# Patient Record
Sex: MALE | Race: WHITE | Employment: OTHER | URBAN - NONMETROPOLITAN AREA
[De-identification: names, ages, dates, MRNs, and addresses within clinical notes are randomized per-mention and may not be internally consistent; named-entity substitution may affect disease eponyms.]

---

## 2017-05-05 ENCOUNTER — HOSPITAL ENCOUNTER (OUTPATIENT)
Dept: CT IMAGING | Age: 62
Discharge: HOME | End: 2017-05-05
Payer: COMMERCIAL

## 2017-05-05 DIAGNOSIS — R10.9 ABDOMINAL PAIN, UNSPECIFIED SITE: ICD-10-CM

## 2017-05-05 LAB
GFR NON-AFRICAN AMERICAN: >60
PERFORMED ON: NORMAL
POC CREATININE: 0.8 MG/DL (ref 0.3–1.3)
POC SAMPLE TYPE: NORMAL

## 2017-05-05 PROCEDURE — 6360000004 HC RX CONTRAST MEDICATION: Performed by: NURSE PRACTITIONER

## 2017-05-05 PROCEDURE — 82565 ASSAY OF CREATININE: CPT

## 2017-05-05 PROCEDURE — 74177 CT ABD & PELVIS W/CONTRAST: CPT

## 2017-05-05 RX ADMIN — IOVERSOL 75 ML: 741 INJECTION INTRA-ARTERIAL; INTRAVENOUS at 11:47

## 2017-08-15 ENCOUNTER — TELEPHONE (OUTPATIENT)
Dept: INTERNAL MEDICINE | Age: 62
End: 2017-08-15

## 2017-08-15 DIAGNOSIS — E78.5 HYPERLIPIDEMIA, UNSPECIFIED HYPERLIPIDEMIA TYPE: ICD-10-CM

## 2017-08-15 DIAGNOSIS — I10 ESSENTIAL HYPERTENSION: ICD-10-CM

## 2017-08-15 DIAGNOSIS — Z00.00 EXAMINATION, MEDICAL, GENERAL: ICD-10-CM

## 2017-08-15 DIAGNOSIS — Z12.5 SPECIAL SCREENING, PROSTATE CANCER: Primary | ICD-10-CM

## 2017-08-24 RX ORDER — ATENOLOL 25 MG/1
TABLET ORAL
Qty: 90 TABLET | Refills: 3 | Status: SHIPPED | OUTPATIENT
Start: 2017-08-24 | End: 2017-11-21

## 2017-09-20 RX ORDER — LOSARTAN POTASSIUM 25 MG/1
TABLET ORAL
Qty: 90 TABLET | Refills: 3 | Status: SHIPPED | OUTPATIENT
Start: 2017-09-20 | End: 2018-08-13 | Stop reason: SDUPTHER

## 2017-11-21 ENCOUNTER — TELEPHONE (OUTPATIENT)
Dept: INTERNAL MEDICINE | Age: 62
End: 2017-11-21

## 2017-11-21 DIAGNOSIS — I10 ESSENTIAL HYPERTENSION: ICD-10-CM

## 2017-11-21 RX ORDER — NEBIVOLOL 5 MG/1
5 TABLET ORAL DAILY
Qty: 30 TABLET | Refills: 11 | Status: SHIPPED | OUTPATIENT
Start: 2017-11-21 | End: 2018-08-13 | Stop reason: CLARIF

## 2017-11-21 NOTE — TELEPHONE ENCOUNTER
He needs Atenolol changed to something else, but not Toprol (has reaction).   [facility]

## 2017-11-30 DIAGNOSIS — R97.20 ELEVATED PSA: Primary | ICD-10-CM

## 2017-11-30 LAB — PSA SERPL-MCNC: 1.24 NG/ML (ref 0–4)

## 2017-12-06 ENCOUNTER — OFFICE VISIT (OUTPATIENT)
Dept: UROLOGY | Facility: CLINIC | Age: 62
End: 2017-12-06

## 2017-12-06 VITALS — HEIGHT: 69 IN | TEMPERATURE: 97.1 F | WEIGHT: 231 LBS | BODY MASS INDEX: 34.21 KG/M2

## 2017-12-06 DIAGNOSIS — N40.1 BENIGN PROSTATIC HYPERPLASIA WITH LOWER URINARY TRACT SYMPTOMS, SYMPTOM DETAILS UNSPECIFIED: ICD-10-CM

## 2017-12-06 DIAGNOSIS — R97.20 ELEVATED PSA: Primary | ICD-10-CM

## 2017-12-06 LAB
BILIRUB BLD-MCNC: NEGATIVE MG/DL
CLARITY, POC: CLEAR
COLOR UR: YELLOW
GLUCOSE UR STRIP-MCNC: NEGATIVE MG/DL
KETONES UR QL: NEGATIVE
LEUKOCYTE EST, POC: NEGATIVE
NITRITE UR-MCNC: NEGATIVE MG/ML
PH UR: 6.5 [PH] (ref 5–8)
PROT UR STRIP-MCNC: NEGATIVE MG/DL
RBC # UR STRIP: NEGATIVE /UL
SP GR UR: 1.01 (ref 1–1.03)
UROBILINOGEN UR QL: NORMAL

## 2017-12-06 PROCEDURE — 81003 URINALYSIS AUTO W/O SCOPE: CPT | Performed by: UROLOGY

## 2017-12-06 PROCEDURE — 99213 OFFICE O/P EST LOW 20 MIN: CPT | Performed by: UROLOGY

## 2017-12-06 NOTE — PROGRESS NOTES
Subjective    Mr. Thompson is 62 y.o. male    CHIEF COMPLAINT: BPH    Patient comes in today for follow-up of BPH he is on max meds of both Proscar and Flomax he does feel maybe his urgency and frequency is a little bit worse from then previously as a way score today is 15 he is not had any urinary tract infections no gross hematuria again some frequency and urgency no other symptoms at this time.    He also has a previous history of an elevated PSA with negative biopsy on his been normal now for quite some time in most recently remains normal at 1.2      History of Present Illness      The following portions of the patient's history were reviewed and updated as appropriate: allergies, current medications, past family history, past medical history, past social history, past surgical history and problem list.    Review of Systems   Constitutional: Negative.  Negative for chills and fever.   Gastrointestinal: Negative for abdominal distention, abdominal pain, anal bleeding, blood in stool and nausea.   Genitourinary: Negative for difficulty urinating, dysuria, flank pain, frequency, hematuria and urgency.   Psychiatric/Behavioral: Negative.  Negative for agitation and confusion.         Current Outpatient Prescriptions:   •  aspirin 81 MG EC tablet, Take 81 mg by mouth Daily., Disp: , Rfl:   •  atenolol (TENORMIN) 25 MG tablet, Take 25 mg by mouth Daily., Disp: , Rfl:   •  coenzyme Q10 100 MG capsule, Take 100 mg by mouth Daily., Disp: , Rfl:   •  finasteride (PROSCAR) 5 MG tablet, Take 1 tablet by mouth Daily., Disp: 90 tablet, Rfl: 5  •  Krill Oil 1000 MG capsule, Take 1 capsule by mouth Daily., Disp: , Rfl:   •  losartan (COZAAR) 25 MG tablet, Take 25 mg by mouth Daily., Disp: , Rfl:   •  Multiple Vitamins-Minerals (MULTIVITAMIN ADULT PO), Take 1 tablet by mouth Daily., Disp: , Rfl:   •  propranolol (INDERAL) 20 MG tablet, Take 20 mg by mouth Daily., Disp: , Rfl:   •  simvastatin (ZOCOR) 40 MG tablet, Take 40 mg by  "mouth Every Night., Disp: , Rfl:   •  tamsulosin (FLOMAX) 0.4 MG capsule 24 hr capsule, Take 1 capsule by mouth 2 (Two) Times a Day., Disp: 180 capsule, Rfl: 3    Past Medical History:   Diagnosis Date   • Hyperlipidemia    • Hypertension        Past Surgical History:   Procedure Laterality Date   • APPENDECTOMY     • CORONARY ARTERY BYPASS GRAFT         Social History     Social History   • Marital status: Unknown     Spouse name: N/A   • Number of children: N/A   • Years of education: N/A     Social History Main Topics   • Smoking status: Never Smoker   • Smokeless tobacco: Never Used   • Alcohol use Yes   • Drug use: None   • Sexual activity: Not Asked     Other Topics Concern   • None     Social History Narrative       Family History   Problem Relation Age of Onset   • Prostate cancer Father    • No Known Problems Mother        Objective    Temp 97.1 °F (36.2 °C)  Ht 175.3 cm (69\")  Wt 105 kg (231 lb)  BMI 34.11 kg/m2    Physical Exam   Constitutional: He is oriented to person, place, and time. He appears well-developed and well-nourished.   Pulmonary/Chest: Effort normal.   Abdominal: Soft. He exhibits no distension and no mass. There is no tenderness. There is no rebound and no guarding. No hernia.   Overweight  Non tender   Genitourinary: Testes normal and penis normal. Rectal exam shows no mass, no tenderness and anal tone normal. Enlarged: for the age of the patient. Right testis shows no mass, no swelling and no tenderness. Left testis shows no mass, no swelling and no tenderness. Circumcised. No hypospadias. No discharge found.   Genitourinary Comments:  The urethral meatus normal in position without evidence of stricture. Epididymis without mass or tenderness. Vas Deferens is palpably normal.Anus and perineum without mass or tenderness. The prostate is approximately 30 ml. It is Symmetric, with a Soft consistency. There are no nodules present. . The seminal vesicles are Not palpable due to the size of " the prostate.     Neurological: He is alert and oriented to person, place, and time.   Vitals reviewed.        Orders Only on 11/30/2017   Component Date Value Ref Range Status   • PSA 11/30/2017 1.240  0.000 - 4.000 ng/mL Final       Results for orders placed or performed in visit on 12/06/17   POC Urinalysis Dipstick, Automated   Result Value Ref Range    Color Yellow Yellow, Straw, Dark Yellow, [place]    Clarity, UA Clear Clear    Glucose, UA Negative Negative, 1000 mg/dL (3+) mg/dL    Bilirubin Negative Negative    Ketones, UA Negative Negative    Specific Gravity  1.015 1.005 - 1.030    Blood, UA Negative Negative    pH, Urine 6.5 5.0 - 8.0    Protein, POC Negative Negative mg/dL    Urobilinogen, UA Normal Normal    Leukocytes Negative Negative    Nitrite, UA Negative Negative       Assessment and Plan    Diagnoses and all orders for this visit:    Elevated PSA  -     POC Urinalysis Dipstick, Automated  -     PSA; Future    Benign prostatic hyperplasia with lower urinary tract symptoms, symptom details unspecified    Plan--we will continue to refill the Flomax for BPH I did discuss operative management with him including G Aniyah procedure TURP I think at this point he wants to continue the medications however if his symptoms worsen he like to do something he will let me know    Most recent PSAs have been normal so we will continue to follow that yearly

## 2017-12-11 ENCOUNTER — RESULTS ENCOUNTER (OUTPATIENT)
Dept: UROLOGY | Facility: CLINIC | Age: 62
End: 2017-12-11

## 2017-12-11 DIAGNOSIS — R97.20 ELEVATED PSA: ICD-10-CM

## 2017-12-20 ENCOUNTER — TELEPHONE (OUTPATIENT)
Dept: INTERNAL MEDICINE | Age: 62
End: 2017-12-20

## 2018-01-31 DIAGNOSIS — N13.8 BENIGN PROSTATIC HYPERPLASIA WITH URINARY OBSTRUCTION: ICD-10-CM

## 2018-01-31 DIAGNOSIS — N40.1 BENIGN PROSTATIC HYPERPLASIA WITH URINARY OBSTRUCTION: ICD-10-CM

## 2018-01-31 RX ORDER — TAMSULOSIN HYDROCHLORIDE 0.4 MG/1
CAPSULE ORAL
Qty: 60 CAPSULE | Refills: 0 | Status: SHIPPED | OUTPATIENT
Start: 2018-01-31 | End: 2018-12-06 | Stop reason: SDUPTHER

## 2018-06-05 RX ORDER — FINASTERIDE 5 MG/1
TABLET, FILM COATED ORAL
Qty: 90 TABLET | Refills: 3 | Status: SHIPPED | OUTPATIENT
Start: 2018-06-05

## 2018-07-02 RX ORDER — TAMSULOSIN HYDROCHLORIDE 0.4 MG/1
CAPSULE ORAL
Qty: 90 CAPSULE | Refills: 0 | Status: SHIPPED | OUTPATIENT
Start: 2018-07-02 | End: 2018-09-28 | Stop reason: SDUPTHER

## 2018-07-31 RX ORDER — ATENOLOL 25 MG/1
TABLET ORAL
Qty: 90 TABLET | Refills: 3 | Status: SHIPPED | OUTPATIENT
Start: 2018-07-31 | End: 2018-08-13 | Stop reason: SDUPTHER

## 2018-08-06 DIAGNOSIS — Z00.00 EXAMINATION, MEDICAL, GENERAL: ICD-10-CM

## 2018-08-06 DIAGNOSIS — Z12.5 SPECIAL SCREENING, PROSTATE CANCER: ICD-10-CM

## 2018-08-06 DIAGNOSIS — E78.5 HYPERLIPIDEMIA, UNSPECIFIED HYPERLIPIDEMIA TYPE: ICD-10-CM

## 2018-08-06 DIAGNOSIS — I10 ESSENTIAL HYPERTENSION: ICD-10-CM

## 2018-08-06 LAB
ALBUMIN SERPL-MCNC: 4.3 G/DL (ref 3.5–5.2)
ALP BLD-CCNC: 47 U/L (ref 40–130)
ALT SERPL-CCNC: 24 U/L (ref 5–41)
ANION GAP SERPL CALCULATED.3IONS-SCNC: 15 MMOL/L (ref 7–19)
AST SERPL-CCNC: 19 U/L (ref 5–40)
BILIRUB SERPL-MCNC: 0.4 MG/DL (ref 0.2–1.2)
BUN BLDV-MCNC: 11 MG/DL (ref 8–23)
CALCIUM SERPL-MCNC: 9.5 MG/DL (ref 8.8–10.2)
CHLORIDE BLD-SCNC: 104 MMOL/L (ref 98–111)
CHOLESTEROL, TOTAL: 141 MG/DL (ref 160–199)
CO2: 24 MMOL/L (ref 22–29)
CREAT SERPL-MCNC: 1 MG/DL (ref 0.5–1.2)
GFR NON-AFRICAN AMERICAN: >60
GLUCOSE BLD-MCNC: 104 MG/DL (ref 74–109)
HBA1C MFR BLD: 5.7 % (ref 4–6)
HCT VFR BLD CALC: 43 % (ref 42–52)
HDLC SERPL-MCNC: 61 MG/DL (ref 55–121)
HEMOGLOBIN: 14.1 G/DL (ref 14–18)
LDL CHOLESTEROL CALCULATED: 55 MG/DL
MCH RBC QN AUTO: 29.5 PG (ref 27–31)
MCHC RBC AUTO-ENTMCNC: 32.8 G/DL (ref 33–37)
MCV RBC AUTO: 90 FL (ref 80–94)
PDW BLD-RTO: 13.2 % (ref 11.5–14.5)
PLATELET # BLD: 195 K/UL (ref 130–400)
PMV BLD AUTO: 11.2 FL (ref 9.4–12.4)
POTASSIUM SERPL-SCNC: 4.4 MMOL/L (ref 3.5–5)
PROSTATE SPECIFIC ANTIGEN: 0.95 NG/ML (ref 0–4)
RBC # BLD: 4.78 M/UL (ref 4.7–6.1)
SODIUM BLD-SCNC: 143 MMOL/L (ref 136–145)
TOTAL PROTEIN: 6.6 G/DL (ref 6.6–8.7)
TRIGL SERPL-MCNC: 123 MG/DL (ref 0–149)
TSH SERPL DL<=0.05 MIU/L-ACNC: 3.24 UIU/ML (ref 0.27–4.2)
WBC # BLD: 6.5 K/UL (ref 4.8–10.8)

## 2018-08-13 ENCOUNTER — OFFICE VISIT (OUTPATIENT)
Dept: INTERNAL MEDICINE | Age: 63
End: 2018-08-13
Payer: MEDICARE

## 2018-08-13 VITALS
OXYGEN SATURATION: 98 % | HEIGHT: 70 IN | SYSTOLIC BLOOD PRESSURE: 120 MMHG | DIASTOLIC BLOOD PRESSURE: 66 MMHG | BODY MASS INDEX: 32.35 KG/M2 | HEART RATE: 53 BPM | WEIGHT: 226 LBS

## 2018-08-13 DIAGNOSIS — Z00.00 ROUTINE GENERAL MEDICAL EXAMINATION AT A HEALTH CARE FACILITY: Primary | ICD-10-CM

## 2018-08-13 DIAGNOSIS — Z23 NEED FOR PROPHYLACTIC VACCINATION AND INOCULATION AGAINST VARICELLA: ICD-10-CM

## 2018-08-13 DIAGNOSIS — Z12.5 PROSTATE CANCER SCREENING: ICD-10-CM

## 2018-08-13 DIAGNOSIS — I25.10 CORONARY ARTERY DISEASE INVOLVING NATIVE CORONARY ARTERY OF NATIVE HEART WITHOUT ANGINA PECTORIS: ICD-10-CM

## 2018-08-13 DIAGNOSIS — I10 ESSENTIAL HYPERTENSION: ICD-10-CM

## 2018-08-13 DIAGNOSIS — E78.00 PURE HYPERCHOLESTEROLEMIA: ICD-10-CM

## 2018-08-13 PROBLEM — N40.1 BENIGN PROSTATIC HYPERPLASIA WITH LOWER URINARY TRACT SYMPTOMS: Status: ACTIVE | Noted: 2017-12-06

## 2018-08-13 PROCEDURE — G0402 INITIAL PREVENTIVE EXAM: HCPCS | Performed by: INTERNAL MEDICINE

## 2018-08-13 PROCEDURE — G8598 ASA/ANTIPLAT THER USED: HCPCS | Performed by: INTERNAL MEDICINE

## 2018-08-13 RX ORDER — ASPIRIN 81 MG/1
81 TABLET ORAL DAILY
COMMUNITY
End: 2022-04-17 | Stop reason: ALTCHOICE

## 2018-08-13 RX ORDER — OMEPRAZOLE 20 MG/1
20 CAPSULE, DELAYED RELEASE ORAL DAILY
COMMUNITY

## 2018-08-13 RX ORDER — UBIDECARENONE 100 MG
100 CAPSULE ORAL DAILY
COMMUNITY
End: 2022-04-17 | Stop reason: ALTCHOICE

## 2018-08-13 RX ORDER — ATENOLOL 25 MG/1
TABLET ORAL
Qty: 90 TABLET | Refills: 3 | Status: SHIPPED | OUTPATIENT
Start: 2018-08-13 | End: 2019-08-14 | Stop reason: SDUPTHER

## 2018-08-13 RX ORDER — BIOTIN 5 MG
1 TABLET ORAL DAILY
COMMUNITY

## 2018-08-13 RX ORDER — SIMVASTATIN 40 MG
TABLET ORAL
Qty: 90 TABLET | Refills: 3 | Status: SHIPPED | OUTPATIENT
Start: 2018-08-13 | End: 2018-10-05 | Stop reason: SDUPTHER

## 2018-08-13 RX ORDER — LOSARTAN POTASSIUM 25 MG/1
TABLET ORAL
Qty: 90 TABLET | Refills: 3 | Status: SHIPPED | OUTPATIENT
Start: 2018-08-13 | End: 2019-08-14 | Stop reason: SDUPTHER

## 2018-08-13 ASSESSMENT — PATIENT HEALTH QUESTIONNAIRE - PHQ9
SUM OF ALL RESPONSES TO PHQ QUESTIONS 1-9: 0
SUM OF ALL RESPONSES TO PHQ QUESTIONS 1-9: 0

## 2018-08-13 NOTE — PROGRESS NOTES
Chief Complaint   Patient presents with    Medicare AWV    Hypertension     Heart rate was 53 today    Hyperlipidemia    Benign Prostatic Hypertrophy       HPI:Patient is here today for welcome to medicare- Medicare annual wellness visit and follow-up coronary artery disease hypertension hyperlipidemia and GERD. He shouldn't feels well he has occasional arthralgias but no chest pressure chest pain no dyspnea no syncope or near syncope or abdominal pain generally doing well blood pressure good when he checks it. No new complaints he denies syncope near syncope or lightheadedness or heart rates below 50. Past Medical History:   Diagnosis Date    Coronary artery disease     Essential hypertension     GERD (gastroesophageal reflux disease)     Hyperlipidemia        Past Surgical History:   Procedure Laterality Date    APPENDECTOMY      CORONARY ARTERY BYPASS GRAFT      PROSTATE SURGERY         Family History   Problem Relation Age of Onset    Prostate Cancer Father     Coronary Art Dis Other     High Cholesterol Other        Social History     Social History    Marital status:      Spouse name: N/A    Number of children: N/A    Years of education: N/A     Occupational History    Not on file. Social History Main Topics    Smoking status: Never Smoker    Smokeless tobacco: Never Used    Alcohol use Not on file    Drug use: Unknown    Sexual activity: Not on file     Other Topics Concern    Not on file     Social History Narrative    No narrative on file       Allergies   Allergen Reactions    Ramipril      Lips swell, hives       Current Outpatient Prescriptions   Medication Sig Dispense Refill    omeprazole (PRILOSEC) 20 MG delayed release capsule Take 20 mg by mouth daily      atenolol (TENORMIN) 25 MG tablet TAKE 1 TABLET BY MOUTH ONCE DAILY 90 tablet 3    losartan (COZAAR) 25 MG tablet TAKE (1) TABLET BY MOUTH ONCE DAILY AS DIRECTED.  90 tablet 3    simvastatin (ZOCOR) 40 MG  A1C test (Diabetic or Prediabetic)  08/06/2019    Potassium monitoring  08/06/2019    Creatinine monitoring  08/06/2019    Colon cancer screen colonoscopy  09/11/2022    Lipid screen  08/06/2023    DTaP/Tdap/Td vaccine (2 - Td) 04/11/2026     Recommendations for Preventive Services Due: see orders and patient instructions/AVS.  . Recommended screening schedule for the next 5-10 years is provided to the patient in written form: see Patient Instructions/AVS.  Obesity Counseling: Assessed behavioral health risks and factors affecting choice of behavior. Suggested weight control approaches, including dietary changes behavioral modification and follow up plan. Provided educational and support documentation. Time spent (minutes): work on weight loss with dietary changes suggested the Mediterranean diet.

## 2018-08-13 NOTE — PATIENT INSTRUCTIONS
you keep track of what you eat and what you do. Follow-up care is a key part of your treatment and safety. Be sure to make and go to all appointments, and call your doctor if you are having problems. It's also a good idea to know your test results and keep a list of the medicines you take. Where can you learn more? Go to [email or web address] and sign in to your [facility] account. Enter A121 in the [employer] box to learn more about \"Learning About Low-Carbohydrate Diets for Weight Loss. \"     If you do not have an account, please click on the \"Sign Up Now\" link. Current as of: May 12, 2017  Content Version: 11.7  © 1186-8983 [employer]. Care instructions adapted under license by [facility] ([facility]). If you have questions about a medical condition or this instruction, always ask your healthcare professional. [place] [address] any warranty or liability for your use of this information. Personalized Preventive Plan for [place]  - 8/13/2018  Medicare offers a range of preventive health benefits. Some of the tests and screenings are paid in full while other may be subject to a deductible, co-insurance, and/or copay. Some of these benefits include a comprehensive review of your medical history including lifestyle, illnesses that may run in your family, and various assessments and screenings as appropriate. After reviewing your medical record and screening and assessments performed today your provider may have ordered immunizations, labs, imaging, and/or referrals for you. A list of these orders (if applicable) as well as your Preventive Care list are included within your After Visit Summary for your review. Other Preventive Recommendations:    · A preventive eye exam performed by an eye specialist is recommended every 1-2 years to screen for glaucoma; cataracts, macular degeneration, and other eye disorders.   · A preventive dental visit is

## 2018-08-19 ASSESSMENT — ENCOUNTER SYMPTOMS
EYE REDNESS: 0
ABDOMINAL PAIN: 0
COUGH: 0
EYE DISCHARGE: 0
SINUS PRESSURE: 0
ABDOMINAL DISTENTION: 0
BACK PAIN: 0
SHORTNESS OF BREATH: 0

## 2018-09-24 ENCOUNTER — OFFICE VISIT (OUTPATIENT)
Dept: CARDIOLOGY | Facility: CLINIC | Age: 63
End: 2018-09-24

## 2018-09-24 VITALS
BODY MASS INDEX: 33.92 KG/M2 | HEART RATE: 61 BPM | HEIGHT: 69 IN | WEIGHT: 229 LBS | DIASTOLIC BLOOD PRESSURE: 70 MMHG | OXYGEN SATURATION: 99 % | SYSTOLIC BLOOD PRESSURE: 112 MMHG

## 2018-09-24 DIAGNOSIS — I10 ESSENTIAL HYPERTENSION: ICD-10-CM

## 2018-09-24 DIAGNOSIS — E78.2 MIXED HYPERLIPIDEMIA: ICD-10-CM

## 2018-09-24 DIAGNOSIS — I25.810 CORONARY ARTERY DISEASE INVOLVING CORONARY BYPASS GRAFT OF NATIVE HEART WITHOUT ANGINA PECTORIS: Primary | ICD-10-CM

## 2018-09-24 PROCEDURE — 99203 OFFICE O/P NEW LOW 30 MIN: CPT | Performed by: INTERNAL MEDICINE

## 2018-09-24 PROCEDURE — 93000 ELECTROCARDIOGRAM COMPLETE: CPT | Performed by: INTERNAL MEDICINE

## 2018-09-24 RX ORDER — CHLORAL HYDRATE 500 MG
1 CAPSULE ORAL DAILY
COMMUNITY
End: 2020-09-17

## 2018-09-24 RX ORDER — OMEPRAZOLE 20 MG/1
20 CAPSULE, DELAYED RELEASE ORAL DAILY
Status: ON HOLD | COMMUNITY
End: 2022-12-01 | Stop reason: SDUPTHER

## 2018-09-24 NOTE — PROGRESS NOTES
Referring Provider: Randi Parks MD    Reason for Consultation: re-establish care    Chief complaint:   Chief Complaint   Patient presents with   • New/Old patient     not referred.  last ov 2/27/15.  pt states no problems just felt like he needed to reestablish.   • Coronary Artery Disease     has been doing well.  he has been seeing his PCP yearly and they found his HR was lower.   • low heart rate     has been running in the 50's.         Subjective .     History of present illness:  Pawel Thompson is a 63 y.o. yo male with history of CAD, s/p CABG in 2003 who presents today for routine follow up.  Chief Complaint   Patient presents with   • New/Old patient     not referred.  last ov 2/27/15.  pt states no problems just felt like he needed to reestablish.   • Coronary Artery Disease     has been doing well.  he has been seeing his PCP yearly and they found his HR was lower.   • low heart rate     has been running in the 50's.     .    History  Past Medical History:   Diagnosis Date   • Coronary artery disease    • Hyperlipidemia    • Hypertension    ,   Past Surgical History:   Procedure Laterality Date   • APPENDECTOMY     • CORONARY ARTERY BYPASS GRAFT     ,   Family History   Problem Relation Age of Onset   • Prostate cancer Father    • Heart disease Father    • Heart disease Mother    • Stroke Mother    • Heart attack Brother    • Heart disease Brother    • No Known Problems Maternal Grandmother    • No Known Problems Maternal Grandfather    • Heart disease Paternal Grandmother    • No Known Problems Paternal Grandfather    • No Known Problems Brother    ,   Social History   Substance Use Topics   • Smoking status: Never Smoker   • Smokeless tobacco: Never Used   • Alcohol use 0.6 oz/week     1 Cans of beer per week      Comment: occasionally   ,     Medications  Current Outpatient Prescriptions   Medication Sig Dispense Refill   • aspirin 81 MG EC tablet Take 81 mg by mouth Daily.     • atenolol (TENORMIN)  "25 MG tablet Take 25 mg by mouth Daily.     • coenzyme Q10 100 MG capsule Take 100 mg by mouth Daily.     • finasteride (PROSCAR) 5 MG tablet Take 1 tablet by mouth Daily. 90 tablet 5   • losartan (COZAAR) 25 MG tablet Take 25 mg by mouth Daily.     • Omega-3 1000 MG capsule Take 1 capsule by mouth Daily.     • omeprazole (priLOSEC) 20 MG capsule Take 20 mg by mouth Daily.     • simvastatin (ZOCOR) 40 MG tablet Take 40 mg by mouth Every Night.     • tamsulosin (FLOMAX) 0.4 MG capsule 24 hr capsule TAKE (1) CAPSULE BY MOUTH TWICE DAILY. 60 capsule 0   • Krill Oil 1000 MG capsule Take 1 capsule by mouth Daily.     • Multiple Vitamins-Minerals (MULTIVITAMIN ADULT PO) Take 1 tablet by mouth Daily.     • propranolol (INDERAL) 20 MG tablet Take 20 mg by mouth Daily.       No current facility-administered medications for this visit.         Allergies:  Patient has no known allergies.    Review of Systems  Review of Systems   HENT: Negative for nosebleeds.    Cardiovascular: Negative for chest pain, claudication, dyspnea on exertion, irregular heartbeat, leg swelling, near-syncope, orthopnea, palpitations, paroxysmal nocturnal dyspnea and syncope.   Respiratory: Negative for cough, hemoptysis and shortness of breath.    Gastrointestinal: Negative for dysphagia, hematemesis and melena.   Genitourinary: Negative for hematuria.   All other systems reviewed and are negative.      Objective     Physical Exam:  /70 (BP Location: Left arm, Patient Position: Sitting, Cuff Size: Adult)   Pulse 61   Ht 175.3 cm (69\")   Wt 104 kg (229 lb)   SpO2 99%   BMI 33.82 kg/m²   Physical Exam   Constitutional: He is oriented to person, place, and time. He appears well-nourished. No distress.   HENT:   Head: Normocephalic.   Eyes: No scleral icterus.   Neck: Normal range of motion. Neck supple.   Cardiovascular: Normal rate, regular rhythm and normal heart sounds.  Exam reveals no gallop and no friction rub.    No murmur " heard.  Pulmonary/Chest: Effort normal and breath sounds normal. No respiratory distress. He has no wheezes. He has no rales.   Abdominal: Soft. Bowel sounds are normal. He exhibits no distension. There is no tenderness.   Musculoskeletal: He exhibits no edema.   Neurological: He is alert and oriented to person, place, and time.   Skin: Skin is warm and dry. He is not diaphoretic. No erythema.   Psychiatric: He has a normal mood and affect. His behavior is normal.       Results Review:   I reviewed the patient's new clinical results.    ECG 12 Lead  Date/Time: 9/24/2018 10:25 AM  Performed by: GEORGE CLIFFORD  Authorized by: GEORGE CLIFFORD   Comparison: not compared with previous ECG   Previous ECG: no previous ECG available  Rhythm: sinus rhythm  Rate: normal  Conduction: conduction normal  ST Segments: ST segments normal  T Waves: T waves normal  QRS axis: normal  Clinical impression: non-specific ECG  Comments: Poor R wave progression            Office Visit on 12/06/2017   Component Date Value Ref Range Status   • Color 12/06/2017 Yellow  Yellow, Straw, Dark Yellow, [place] Final   • Clarity, UA 12/06/2017 Clear  Clear Final   • Glucose, UA 12/06/2017 Negative  Negative, 1000 mg/dL (3+) mg/dL Final   • Bilirubin 12/06/2017 Negative  Negative Final   • Ketones, UA 12/06/2017 Negative  Negative Final   • Specific Gravity  12/06/2017 1.015  1.005 - 1.030 Final   • Blood, UA 12/06/2017 Negative  Negative Final   • pH, Urine 12/06/2017 6.5  5.0 - 8.0 Final   • Protein, POC 12/06/2017 Negative  Negative mg/dL Final   • Urobilinogen, UA 12/06/2017 Normal  Normal Final   • Leukocytes 12/06/2017 Negative  Negative Final   • Nitrite, UA 12/06/2017 Negative  Negative Final       Assessment/Plan   Pawel was seen today for new/old patient, coronary artery disease and low heart rate.    Diagnoses and all orders for this visit:    Coronary artery disease involving coronary bypass graft of native heart without angina pectoris, The  patient denies chest pain, shortness of breath, dyspnea on exertion, orthopnea, PND, edema. There is no evidence of ongoing ischemia    Mixed hyperlipidemia, Patient's statin therapy is followed by PCP.    Essential hypertension, good control    Other orders  -     ECG 12 Lead        Patient's Body mass index is 33.82 kg/m². BMI is above normal parameters. Recommendations include: exercise counseling.

## 2018-09-28 RX ORDER — TAMSULOSIN HYDROCHLORIDE 0.4 MG/1
CAPSULE ORAL
Qty: 90 CAPSULE | Refills: 3 | Status: SHIPPED | OUTPATIENT
Start: 2018-09-28

## 2018-10-05 DIAGNOSIS — E78.00 PURE HYPERCHOLESTEROLEMIA: ICD-10-CM

## 2018-10-05 RX ORDER — SIMVASTATIN 40 MG
TABLET ORAL
Qty: 90 TABLET | Refills: 0 | Status: SHIPPED | OUTPATIENT
Start: 2018-10-05 | End: 2019-08-11 | Stop reason: SDUPTHER

## 2018-10-10 ENCOUNTER — HOSPITAL ENCOUNTER (OUTPATIENT)
Dept: CARDIOLOGY | Facility: HOSPITAL | Age: 63
Discharge: HOME | End: 2018-10-10
Attending: INTERNAL MEDICINE | Admitting: INTERNAL MEDICINE

## 2018-10-10 DIAGNOSIS — I25.810 CORONARY ARTERY DISEASE INVOLVING CORONARY BYPASS GRAFT OF NATIVE HEART WITHOUT ANGINA PECTORIS: ICD-10-CM

## 2018-10-10 LAB
BH CV STRESS BP STAGE 1: NORMAL
BH CV STRESS BP STAGE 2: NORMAL
BH CV STRESS BP STAGE 3: NORMAL
BH CV STRESS DURATION MIN STAGE 1: 3
BH CV STRESS DURATION MIN STAGE 2: 3
BH CV STRESS DURATION MIN STAGE 3: 1
BH CV STRESS DURATION SEC STAGE 1: 0
BH CV STRESS DURATION SEC STAGE 2: 0
BH CV STRESS DURATION SEC STAGE 3: 46
BH CV STRESS GRADE STAGE 1: 10
BH CV STRESS GRADE STAGE 2: 12
BH CV STRESS GRADE STAGE 3: 14
BH CV STRESS HR STAGE 1: 88
BH CV STRESS HR STAGE 2: 111
BH CV STRESS HR STAGE 3: 129
BH CV STRESS METS STAGE 1: 5
BH CV STRESS METS STAGE 2: 7.5
BH CV STRESS METS STAGE 3: 10
BH CV STRESS PROTOCOL 1: NORMAL
BH CV STRESS RECOVERY BP: NORMAL MMHG
BH CV STRESS RECOVERY HR: 68 BPM
BH CV STRESS SPEED STAGE 1: 1.7
BH CV STRESS SPEED STAGE 2: 2.5
BH CV STRESS SPEED STAGE 3: 3.4
BH CV STRESS STAGE 1: 1
BH CV STRESS STAGE 2: 2
BH CV STRESS STAGE 3: 3
MAXIMAL PREDICTED HEART RATE: 155 BPM
PERCENT MAX PREDICTED HR: 83.23 %
STRESS BASELINE BP: NORMAL MMHG
STRESS BASELINE HR: 55 BPM
STRESS PERCENT HR: 98 %
STRESS POST ESTIMATED WORKLOAD: 10 METS
STRESS POST EXERCISE DUR MIN: 7 MIN
STRESS POST EXERCISE DUR SEC: 46 SEC
STRESS POST PEAK BP: NORMAL MMHG
STRESS POST PEAK HR: 129 BPM
STRESS TARGET HR: 132 BPM

## 2018-10-10 PROCEDURE — 93352 ADMIN ECG CONTRAST AGENT: CPT | Performed by: INTERNAL MEDICINE

## 2018-10-10 PROCEDURE — 93017 CV STRESS TEST TRACING ONLY: CPT

## 2018-10-10 PROCEDURE — 93350 STRESS TTE ONLY: CPT | Performed by: INTERNAL MEDICINE

## 2018-10-10 PROCEDURE — 25010000002 PERFLUTREN 6.52 MG/ML SUSPENSION: Performed by: INTERNAL MEDICINE

## 2018-10-10 PROCEDURE — 93018 CV STRESS TEST I&R ONLY: CPT | Performed by: INTERNAL MEDICINE

## 2018-10-10 PROCEDURE — 93350 STRESS TTE ONLY: CPT

## 2018-10-10 RX ADMIN — PERFLUTREN 8.48 MG: 6.52 INJECTION, SUSPENSION INTRAVENOUS at 10:44

## 2018-11-29 LAB — PSA SERPL-MCNC: 0.93 NG/ML (ref 0–4)

## 2018-12-06 ENCOUNTER — OFFICE VISIT (OUTPATIENT)
Dept: UROLOGY | Facility: CLINIC | Age: 63
End: 2018-12-06

## 2018-12-06 VITALS — BODY MASS INDEX: 33.92 KG/M2 | TEMPERATURE: 97.1 F | WEIGHT: 229 LBS | HEIGHT: 69 IN

## 2018-12-06 DIAGNOSIS — N40.1 BENIGN PROSTATIC HYPERPLASIA WITH LOWER URINARY TRACT SYMPTOMS, SYMPTOM DETAILS UNSPECIFIED: ICD-10-CM

## 2018-12-06 DIAGNOSIS — N13.8 BENIGN PROSTATIC HYPERPLASIA WITH URINARY OBSTRUCTION: Primary | ICD-10-CM

## 2018-12-06 DIAGNOSIS — N40.1 BENIGN PROSTATIC HYPERPLASIA WITH URINARY OBSTRUCTION: ICD-10-CM

## 2018-12-06 DIAGNOSIS — R97.20 ELEVATED PSA: ICD-10-CM

## 2018-12-06 DIAGNOSIS — N40.1 BENIGN PROSTATIC HYPERPLASIA WITH URINARY OBSTRUCTION: Primary | ICD-10-CM

## 2018-12-06 DIAGNOSIS — N13.8 BENIGN PROSTATIC HYPERPLASIA WITH URINARY OBSTRUCTION: ICD-10-CM

## 2018-12-06 LAB
BILIRUB BLD-MCNC: NEGATIVE MG/DL
CLARITY, POC: CLEAR
COLOR UR: YELLOW
GLUCOSE UR STRIP-MCNC: NEGATIVE MG/DL
KETONES UR QL: NEGATIVE
LEUKOCYTE EST, POC: NEGATIVE
NITRITE UR-MCNC: NEGATIVE MG/ML
PH UR: 6 [PH] (ref 5–8)
PROT UR STRIP-MCNC: NEGATIVE MG/DL
RBC # UR STRIP: NEGATIVE /UL
SP GR UR: 1.02 (ref 1–1.03)
UROBILINOGEN UR QL: NORMAL

## 2018-12-06 PROCEDURE — 81001 URINALYSIS AUTO W/SCOPE: CPT | Performed by: UROLOGY

## 2018-12-06 PROCEDURE — 99213 OFFICE O/P EST LOW 20 MIN: CPT | Performed by: UROLOGY

## 2018-12-06 RX ORDER — TAMSULOSIN HYDROCHLORIDE 0.4 MG/1
1 CAPSULE ORAL DAILY
Qty: 90 CAPSULE | Refills: 5 | Status: SHIPPED | OUTPATIENT
Start: 2018-12-06 | End: 2019-12-26

## 2018-12-06 RX ORDER — FINASTERIDE 5 MG/1
5 TABLET, FILM COATED ORAL DAILY
Qty: 90 TABLET | Refills: 5 | Status: SHIPPED | OUTPATIENT
Start: 2018-12-06 | End: 2020-03-04

## 2018-12-06 NOTE — PATIENT INSTRUCTIONS

## 2018-12-06 NOTE — PROGRESS NOTES
Subjective    Mr. Thompson is 63 y.o. male    CHIEF COMPLAINT: BPH    Patient came back today for follow-up of BPH clinically he is doing well his 8 AUA score today is 14 he does take Flomax on a daily basis and also finasteride on a daily basis he is not had any gross hematuria no urinary tract infections no worsening of his voiding symptoms since receiving no flank pain etc.    He also has a previous history of elevated PSA his PSA today is stable at 0.933      History of Present Illness      The following portions of the patient's history were reviewed and updated as appropriate: allergies, current medications, past family history, past medical history, past social history, past surgical history and problem list.    Review of Systems   Constitutional: Negative.    Gastrointestinal: Negative for abdominal distention, abdominal pain, blood in stool and nausea.   Genitourinary: Negative for difficulty urinating, dysuria, flank pain, frequency, hematuria and urgency.   Psychiatric/Behavioral: Negative.  Negative for agitation and confusion.         Current Outpatient Medications:   •  aspirin 81 MG EC tablet, Take 81 mg by mouth Daily., Disp: , Rfl:   •  atenolol (TENORMIN) 25 MG tablet, Take 25 mg by mouth Daily., Disp: , Rfl:   •  coenzyme Q10 100 MG capsule, Take 100 mg by mouth Daily., Disp: , Rfl:   •  finasteride (PROSCAR) 5 MG tablet, Take 1 tablet by mouth Daily., Disp: 90 tablet, Rfl: 5  •  Krill Oil 1000 MG capsule, Take 1 capsule by mouth Daily., Disp: , Rfl:   •  losartan (COZAAR) 25 MG tablet, Take 25 mg by mouth Daily., Disp: , Rfl:   •  Multiple Vitamins-Minerals (MULTIVITAMIN ADULT PO), Take 1 tablet by mouth Daily., Disp: , Rfl:   •  Omega-3 1000 MG capsule, Take 1 capsule by mouth Daily., Disp: , Rfl:   •  omeprazole (priLOSEC) 20 MG capsule, Take 20 mg by mouth Daily., Disp: , Rfl:   •  propranolol (INDERAL) 20 MG tablet, Take 20 mg by mouth Daily., Disp: , Rfl:   •  simvastatin (ZOCOR) 40 MG tablet,  "Take 40 mg by mouth Every Night., Disp: , Rfl:   •  tamsulosin (FLOMAX) 0.4 MG capsule 24 hr capsule, Take 1 capsule by mouth Daily., Disp: 90 capsule, Rfl: 5    Past Medical History:   Diagnosis Date   • Coronary artery disease    • Hyperlipidemia    • Hypertension        Past Surgical History:   Procedure Laterality Date   • APPENDECTOMY     • CORONARY ARTERY BYPASS GRAFT         Social History     Socioeconomic History   • Marital status:      Spouse name: Not on file   • Number of children: Not on file   • Years of education: Not on file   • Highest education level: Not on file   Tobacco Use   • Smoking status: Never Smoker   • Smokeless tobacco: Never Used   Substance and Sexual Activity   • Alcohol use: Yes     Alcohol/week: 0.6 oz     Types: 1 Cans of beer per week     Comment: occasionally   • Drug use: No   • Sexual activity: Defer       Family History   Problem Relation Age of Onset   • Prostate cancer Father    • Heart disease Father    • Heart disease Mother    • Stroke Mother    • Heart attack Brother    • Heart disease Brother    • No Known Problems Maternal Grandmother    • No Known Problems Maternal Grandfather    • Heart disease Paternal Grandmother    • No Known Problems Paternal Grandfather    • No Known Problems Brother        Objective    Temp 97.1 °F (36.2 °C)   Ht 175.3 cm (69\")   Wt 104 kg (229 lb)   BMI 33.82 kg/m²     Physical Exam      Hospital Outpatient Visit on 10/10/2018   Component Date Value Ref Range Status   • BH CV STRESS PROTOCOL 1 10/10/2018 Wilfredo   Final   • Stage 1 10/10/2018 1   Final   • HR Stage 1 10/10/2018 88   Final   • BP Stage 1 10/10/2018 162/55   Final   • Duration Min Stage 1 10/10/2018 3   Final   • Duration Sec Stage 1 10/10/2018 0   Final   • Grade Stage 1 10/10/2018 10   Final   • Speed Stage 1 10/10/2018 1.7   Final   • BH CV STRESS METS STAGE 1 10/10/2018 5   Final   • Stage 2 10/10/2018 2   Final   • HR Stage 2 10/10/2018 111   Final   • BP Stage 2 " 10/10/2018 178/59   Final   • Duration Min Stage 2 10/10/2018 3   Final   • Duration Sec Stage 2 10/10/2018 0   Final   • Grade Stage 2 10/10/2018 12   Final   • Speed Stage 2 10/10/2018 2.5   Final   • BH CV STRESS METS STAGE 2 10/10/2018 7.5   Final   • Stage 3 10/10/2018 3   Final   • HR Stage 3 10/10/2018 129   Final   • BP Stage 3 10/10/2018 194/51   Final   • Duration Min Stage 3 10/10/2018 1   Final   • Duration Sec Stage 3 10/10/2018 46   Final   • Grade Stage 3 10/10/2018 14   Final   • Speed Stage 3 10/10/2018 3.4   Final   • BH CV STRESS METS STAGE 3 10/10/2018 10.0   Final   • Baseline HR 10/10/2018 55  bpm Final   • Baseline BP 10/10/2018 140/80  mmHg Final   • Peak HR 10/10/2018 129  bpm Final   • Percent Max Pred HR 10/10/2018 83.23  % Final   • Percent Target HR 10/10/2018 98  % Final   • Peak BP 10/10/2018 194/51  mmHg Final   • Recovery HR 10/10/2018 68  bpm Final   • Recovery BP 10/10/2018 149/64  mmHg Final   • Target HR (85%) 10/10/2018 132  bpm Final   • Max. Pred. HR (100%) 10/10/2018 155  bpm Final   • Exercise duration (min) 10/10/2018 7  min Final   • Exercise duration (sec) 10/10/2018 46  sec Final   • Estimated workload 10/10/2018 10.0  METS Final       Results for orders placed or performed in visit on 12/06/18   POC Urinalysis Dipstick, Multipro   Result Value Ref Range    Color Yellow Yellow, Straw, Dark Yellow, [place]    Clarity, UA Clear Clear    Glucose, UA Negative Negative, 1000 mg/dL (3+) mg/dL    Bilirubin Negative Negative    Ketones, UA Negative Negative    Specific Gravity  1.020 1.005 - 1.030    Blood, UA Negative Negative    pH, Urine 6.0 5.0 - 8.0    Protein, POC Negative Negative mg/dL    Urobilinogen, UA Normal Normal    Nitrite, UA Negative Negative    Leukocytes Negative Negative     Patient's Body mass index is 33.82 kg/m². BMI is above normal parameters. Recommendations include: educational material.  Assessment and Plan    Diagnoses and all orders for this  visit:    BPH (benign prostatic hypertrophy) with urinary obstruction  -     POC Urinalysis Dipstick, Multipro  -     tamsulosin (FLOMAX) 0.4 MG capsule 24 hr capsule; Take 1 capsule by mouth Daily.  -     PSA DIAGNOSTIC; Future    Elevated PSA    Benign prostatic hyperplasia with lower urinary tract symptoms, symptom details unspecified  -     finasteride (PROSCAR) 5 MG tablet; Take 1 tablet by mouth Daily.    Benign prostatic hyperplasia with urinary obstruction  -     POC Urinalysis Dipstick, Multipro  -     tamsulosin (FLOMAX) 0.4 MG capsule 24 hr capsule; Take 1 capsule by mouth Daily.  -     PSA DIAGNOSTIC; Future    Plan--patient seems to doing well we will refill his finasteride and Proscar and Flomax for him I will seen back again in the office in 1 year.    We will recheck a PSA at that time if he should have any worsening of his symptoms he will let me know

## 2019-08-11 DIAGNOSIS — E78.00 PURE HYPERCHOLESTEROLEMIA: ICD-10-CM

## 2019-08-13 RX ORDER — SIMVASTATIN 40 MG
TABLET ORAL
Qty: 90 TABLET | Refills: 3 | Status: SHIPPED | OUTPATIENT
Start: 2019-08-13 | End: 2019-08-14 | Stop reason: SDUPTHER

## 2019-08-14 DIAGNOSIS — I10 ESSENTIAL HYPERTENSION: ICD-10-CM

## 2019-08-14 DIAGNOSIS — E78.00 PURE HYPERCHOLESTEROLEMIA: ICD-10-CM

## 2019-08-14 RX ORDER — ATENOLOL 25 MG/1
TABLET ORAL
Qty: 90 TABLET | Refills: 3 | Status: SHIPPED | OUTPATIENT
Start: 2019-08-14 | End: 2020-10-02

## 2019-08-14 RX ORDER — LOSARTAN POTASSIUM 25 MG/1
TABLET ORAL
Qty: 90 TABLET | Refills: 3 | Status: SHIPPED | OUTPATIENT
Start: 2019-08-14 | End: 2020-07-30

## 2019-08-14 RX ORDER — SIMVASTATIN 40 MG
TABLET ORAL
Qty: 90 TABLET | Refills: 3 | Status: SHIPPED | OUTPATIENT
Start: 2019-08-14 | End: 2020-10-14

## 2019-08-27 DIAGNOSIS — E78.00 PURE HYPERCHOLESTEROLEMIA: ICD-10-CM

## 2019-08-27 DIAGNOSIS — Z00.00 ROUTINE GENERAL MEDICAL EXAMINATION AT A HEALTH CARE FACILITY: ICD-10-CM

## 2019-08-27 DIAGNOSIS — Z12.5 PROSTATE CANCER SCREENING: ICD-10-CM

## 2019-08-27 LAB
ALBUMIN SERPL-MCNC: 4.6 G/DL (ref 3.5–5.2)
ALP BLD-CCNC: 46 U/L (ref 40–130)
ALT SERPL-CCNC: 28 U/L (ref 5–41)
ANION GAP SERPL CALCULATED.3IONS-SCNC: 12 MMOL/L (ref 7–19)
AST SERPL-CCNC: 21 U/L (ref 5–40)
BILIRUB SERPL-MCNC: 0.4 MG/DL (ref 0.2–1.2)
BUN BLDV-MCNC: 13 MG/DL (ref 8–23)
CALCIUM SERPL-MCNC: 9.7 MG/DL (ref 8.8–10.2)
CHLORIDE BLD-SCNC: 103 MMOL/L (ref 98–111)
CHOLESTEROL, TOTAL: 154 MG/DL (ref 160–199)
CO2: 25 MMOL/L (ref 22–29)
CREAT SERPL-MCNC: 1 MG/DL (ref 0.5–1.2)
GFR NON-AFRICAN AMERICAN: >60
GLUCOSE BLD-MCNC: 106 MG/DL (ref 74–109)
HBA1C MFR BLD: 5.7 % (ref 4–6)
HCT VFR BLD CALC: 44.7 % (ref 42–52)
HDLC SERPL-MCNC: 65 MG/DL (ref 55–121)
HEMOGLOBIN: 14.8 G/DL (ref 14–18)
LDL CHOLESTEROL CALCULATED: 70 MG/DL
MCH RBC QN AUTO: 30.3 PG (ref 27–31)
MCHC RBC AUTO-ENTMCNC: 33.1 G/DL (ref 33–37)
MCV RBC AUTO: 91.6 FL (ref 80–94)
PDW BLD-RTO: 13.2 % (ref 11.5–14.5)
PLATELET # BLD: 225 K/UL (ref 130–400)
PMV BLD AUTO: 11.4 FL (ref 9.4–12.4)
POTASSIUM SERPL-SCNC: 4.7 MMOL/L (ref 3.5–5)
PROSTATE SPECIFIC ANTIGEN: 1.09 NG/ML (ref 0–4)
RBC # BLD: 4.88 M/UL (ref 4.7–6.1)
SODIUM BLD-SCNC: 140 MMOL/L (ref 136–145)
TOTAL PROTEIN: 7.4 G/DL (ref 6.6–8.7)
TRIGL SERPL-MCNC: 96 MG/DL (ref 0–149)
TSH SERPL DL<=0.05 MIU/L-ACNC: 3.52 UIU/ML (ref 0.27–4.2)
WBC # BLD: 7.6 K/UL (ref 4.8–10.8)

## 2019-08-31 ENCOUNTER — HOSPITAL ENCOUNTER (OUTPATIENT)
Facility: HOSPITAL | Age: 64
Setting detail: OBSERVATION
Discharge: HOME | End: 2019-09-01
Attending: FAMILY MEDICINE | Admitting: FAMILY MEDICINE

## 2019-08-31 ENCOUNTER — APPOINTMENT (OUTPATIENT)
Dept: CARDIOLOGY | Facility: HOSPITAL | Age: 64
End: 2019-08-31

## 2019-08-31 ENCOUNTER — APPOINTMENT (OUTPATIENT)
Dept: GENERAL RADIOLOGY | Facility: HOSPITAL | Age: 64
End: 2019-08-31

## 2019-08-31 DIAGNOSIS — R07.9 CHEST PAIN, UNSPECIFIED TYPE: ICD-10-CM

## 2019-08-31 DIAGNOSIS — I48.91 NEW ONSET ATRIAL FIBRILLATION (HCC): Primary | ICD-10-CM

## 2019-08-31 PROBLEM — I25.10 CORONARY ARTERY DISEASE: Status: ACTIVE | Noted: 2019-08-31

## 2019-08-31 PROBLEM — E78.5 HYPERLIPIDEMIA: Status: ACTIVE | Noted: 2019-08-31

## 2019-08-31 PROBLEM — I10 HYPERTENSION: Status: ACTIVE | Noted: 2019-08-31

## 2019-08-31 LAB
ALBUMIN SERPL-MCNC: 4.3 G/DL (ref 3.5–5)
ALBUMIN/GLOB SERPL: 1.5 G/DL (ref 1.1–2.5)
ALP SERPL-CCNC: 42 U/L (ref 24–120)
ALT SERPL W P-5'-P-CCNC: 36 U/L (ref 0–54)
ANION GAP SERPL CALCULATED.3IONS-SCNC: 6 MMOL/L (ref 4–13)
AST SERPL-CCNC: 31 U/L (ref 7–45)
BASOPHILS # BLD AUTO: 0.03 10*3/MM3 (ref 0–0.2)
BASOPHILS NFR BLD AUTO: 0.3 % (ref 0–1.5)
BH CV STRESS BP STAGE 1: NORMAL
BH CV STRESS BP STAGE 2: NORMAL
BH CV STRESS BP STAGE 3: NORMAL
BH CV STRESS DOB - ATROPINE STAGE 3: 0.6
BH CV STRESS DOSE DOBUTAMINE STAGE 1: 10
BH CV STRESS DOSE DOBUTAMINE STAGE 2: 20
BH CV STRESS DOSE DOBUTAMINE STAGE 3: 30
BH CV STRESS DOSE DOBUTAMINE STAGE 4: 40
BH CV STRESS DURATION MIN STAGE 1: 3
BH CV STRESS DURATION MIN STAGE 2: 3
BH CV STRESS DURATION MIN STAGE 3: 3
BH CV STRESS DURATION MIN STAGE 4: 0
BH CV STRESS DURATION SEC STAGE 1: 0
BH CV STRESS DURATION SEC STAGE 2: 0
BH CV STRESS DURATION SEC STAGE 3: 0
BH CV STRESS DURATION SEC STAGE 4: 54
BH CV STRESS HR STAGE 1: 102
BH CV STRESS HR STAGE 2: 108
BH CV STRESS HR STAGE 3: 118
BH CV STRESS HR STAGE 4: 183
BH CV STRESS PROTOCOL 1: NORMAL
BH CV STRESS RECOVERY BP: NORMAL MMHG
BH CV STRESS RECOVERY HR: 96 BPM
BH CV STRESS STAGE 1: 1
BH CV STRESS STAGE 2: 2
BH CV STRESS STAGE 3: 3
BH CV STRESS STAGE 4: 4
BILIRUB SERPL-MCNC: 0.7 MG/DL (ref 0.1–1)
BUN BLD-MCNC: 12 MG/DL (ref 5–21)
BUN/CREAT SERPL: 14.1 (ref 7–25)
CALCIUM SPEC-SCNC: 9.5 MG/DL (ref 8.4–10.4)
CHLORIDE SERPL-SCNC: 105 MMOL/L (ref 98–110)
CO2 SERPL-SCNC: 27 MMOL/L (ref 24–31)
CREAT BLD-MCNC: 0.85 MG/DL (ref 0.5–1.4)
D DIMER PPP FEU-MCNC: 0.43 MG/L (FEU) (ref 0–0.5)
DEPRECATED RDW RBC AUTO: 43 FL (ref 37–54)
EOSINOPHIL # BLD AUTO: 0.13 10*3/MM3 (ref 0–0.4)
EOSINOPHIL NFR BLD AUTO: 1.2 % (ref 0.3–6.2)
ERYTHROCYTE [DISTWIDTH] IN BLOOD BY AUTOMATED COUNT: 13.3 % (ref 12.3–15.4)
GFR SERPL CREATININE-BSD FRML MDRD: 90 ML/MIN/1.73
GLOBULIN UR ELPH-MCNC: 2.9 GM/DL
GLUCOSE BLD-MCNC: 104 MG/DL (ref 70–100)
HCT VFR BLD AUTO: 42 % (ref 37.5–51)
HGB BLD-MCNC: 14.4 G/DL (ref 13–17.7)
IMM GRANULOCYTES # BLD AUTO: 0.03 10*3/MM3 (ref 0–0.05)
IMM GRANULOCYTES NFR BLD AUTO: 0.3 % (ref 0–0.5)
LYMPHOCYTES # BLD AUTO: 1.87 10*3/MM3 (ref 0.7–3.1)
LYMPHOCYTES NFR BLD AUTO: 17.7 % (ref 19.6–45.3)
MAXIMAL PREDICTED HEART RATE: 155 BPM
MCH RBC QN AUTO: 30.3 PG (ref 26.6–33)
MCHC RBC AUTO-ENTMCNC: 34.3 G/DL (ref 31.5–35.7)
MCV RBC AUTO: 88.4 FL (ref 79–97)
MONOCYTES # BLD AUTO: 1.18 10*3/MM3 (ref 0.1–0.9)
MONOCYTES NFR BLD AUTO: 11.2 % (ref 5–12)
NEUTROPHILS # BLD AUTO: 7.3 10*3/MM3 (ref 1.7–7)
NEUTROPHILS NFR BLD AUTO: 69.3 % (ref 42.7–76)
NRBC BLD AUTO-RTO: 0 /100 WBC (ref 0–0.2)
NT-PROBNP SERPL-MCNC: 869 PG/ML (ref 0–900)
PERCENT MAX PREDICTED HR: 118.06 %
PLATELET # BLD AUTO: 209 10*3/MM3 (ref 140–450)
PMV BLD AUTO: 11.1 FL (ref 6–12)
POTASSIUM BLD-SCNC: 4.3 MMOL/L (ref 3.5–5.3)
PROT SERPL-MCNC: 7.2 G/DL (ref 6.3–8.7)
RBC # BLD AUTO: 4.75 10*6/MM3 (ref 4.14–5.8)
SODIUM BLD-SCNC: 138 MMOL/L (ref 135–145)
STRESS BASELINE BP: NORMAL MMHG
STRESS BASELINE HR: 74 BPM
STRESS PERCENT HR: 139 %
STRESS POST EXERCISE DUR MIN: 9 MIN
STRESS POST EXERCISE DUR SEC: 54 SEC
STRESS POST PEAK BP: NORMAL MMHG
STRESS POST PEAK HR: 183 BPM
STRESS TARGET HR: 132 BPM
T4 FREE SERPL-MCNC: 0.95 NG/DL (ref 0.78–2.19)
TROPONIN I SERPL-MCNC: <0.012 NG/ML (ref 0–0.03)
TROPONIN I SERPL-MCNC: <0.012 NG/ML (ref 0–0.03)
TSH SERPL DL<=0.05 MIU/L-ACNC: 1.73 UIU/ML (ref 0.47–4.68)
WBC NRBC COR # BLD: 10.54 10*3/MM3 (ref 3.4–10.8)

## 2019-08-31 PROCEDURE — 94760 N-INVAS EAR/PLS OXIMETRY 1: CPT

## 2019-08-31 PROCEDURE — 99285 EMERGENCY DEPT VISIT HI MDM: CPT

## 2019-08-31 PROCEDURE — 25010000002 PERFLUTREN 6.52 MG/ML SUSPENSION: Performed by: INTERNAL MEDICINE

## 2019-08-31 PROCEDURE — 93350 STRESS TTE ONLY: CPT | Performed by: INTERNAL MEDICINE

## 2019-08-31 PROCEDURE — 93350 STRESS TTE ONLY: CPT

## 2019-08-31 PROCEDURE — 94799 UNLISTED PULMONARY SVC/PX: CPT

## 2019-08-31 PROCEDURE — 93010 ELECTROCARDIOGRAM REPORT: CPT | Performed by: INTERNAL MEDICINE

## 2019-08-31 PROCEDURE — 83880 ASSAY OF NATRIURETIC PEPTIDE: CPT | Performed by: PHYSICIAN ASSISTANT

## 2019-08-31 PROCEDURE — 93018 CV STRESS TEST I&R ONLY: CPT | Performed by: INTERNAL MEDICINE

## 2019-08-31 PROCEDURE — 84481 FREE ASSAY (FT-3): CPT | Performed by: PHYSICIAN ASSISTANT

## 2019-08-31 PROCEDURE — 85379 FIBRIN DEGRADATION QUANT: CPT | Performed by: PHYSICIAN ASSISTANT

## 2019-08-31 PROCEDURE — 84484 ASSAY OF TROPONIN QUANT: CPT | Performed by: PHYSICIAN ASSISTANT

## 2019-08-31 PROCEDURE — 25010000003 DOBUTAMINE PER 250 MG: Performed by: INTERNAL MEDICINE

## 2019-08-31 PROCEDURE — G0378 HOSPITAL OBSERVATION PER HR: HCPCS

## 2019-08-31 PROCEDURE — 84443 ASSAY THYROID STIM HORMONE: CPT | Performed by: PHYSICIAN ASSISTANT

## 2019-08-31 PROCEDURE — 93005 ELECTROCARDIOGRAM TRACING: CPT | Performed by: FAMILY MEDICINE

## 2019-08-31 PROCEDURE — 93017 CV STRESS TEST TRACING ONLY: CPT

## 2019-08-31 PROCEDURE — 85025 COMPLETE CBC W/AUTO DIFF WBC: CPT | Performed by: PHYSICIAN ASSISTANT

## 2019-08-31 PROCEDURE — 96365 THER/PROPH/DIAG IV INF INIT: CPT

## 2019-08-31 PROCEDURE — 93005 ELECTROCARDIOGRAM TRACING: CPT | Performed by: PHYSICIAN ASSISTANT

## 2019-08-31 PROCEDURE — 80053 COMPREHEN METABOLIC PANEL: CPT | Performed by: PHYSICIAN ASSISTANT

## 2019-08-31 PROCEDURE — 84439 ASSAY OF FREE THYROXINE: CPT | Performed by: PHYSICIAN ASSISTANT

## 2019-08-31 PROCEDURE — 71045 X-RAY EXAM CHEST 1 VIEW: CPT

## 2019-08-31 PROCEDURE — 93352 ADMIN ECG CONTRAST AGENT: CPT | Performed by: INTERNAL MEDICINE

## 2019-08-31 RX ORDER — DOBUTAMINE HYDROCHLORIDE 100 MG/100ML
10-50 INJECTION INTRAVENOUS CONTINUOUS
Status: DISCONTINUED | OUTPATIENT
Start: 2019-08-31 | End: 2019-09-01 | Stop reason: HOSPADM

## 2019-08-31 RX ORDER — FINASTERIDE 5 MG/1
5 TABLET, FILM COATED ORAL DAILY
Status: DISCONTINUED | OUTPATIENT
Start: 2019-08-31 | End: 2019-09-01 | Stop reason: HOSPADM

## 2019-08-31 RX ORDER — ONDANSETRON 2 MG/ML
4 INJECTION INTRAMUSCULAR; INTRAVENOUS EVERY 6 HOURS PRN
Status: DISCONTINUED | OUTPATIENT
Start: 2019-08-31 | End: 2019-09-01 | Stop reason: HOSPADM

## 2019-08-31 RX ORDER — ALUMINA, MAGNESIA, AND SIMETHICONE 2400; 2400; 240 MG/30ML; MG/30ML; MG/30ML
15 SUSPENSION ORAL EVERY 6 HOURS PRN
Status: DISCONTINUED | OUTPATIENT
Start: 2019-08-31 | End: 2019-09-01 | Stop reason: HOSPADM

## 2019-08-31 RX ORDER — LOSARTAN POTASSIUM 25 MG/1
25 TABLET ORAL DAILY
Status: DISCONTINUED | OUTPATIENT
Start: 2019-08-31 | End: 2019-09-01 | Stop reason: HOSPADM

## 2019-08-31 RX ORDER — ASPIRIN 81 MG/1
81 TABLET ORAL DAILY
Status: DISCONTINUED | OUTPATIENT
Start: 2019-09-01 | End: 2019-09-01 | Stop reason: HOSPADM

## 2019-08-31 RX ORDER — METOPROLOL TARTRATE 5 MG/5ML
5 INJECTION INTRAVENOUS ONCE
Status: COMPLETED | OUTPATIENT
Start: 2019-08-31 | End: 2019-08-31

## 2019-08-31 RX ORDER — ATORVASTATIN CALCIUM 10 MG/1
20 TABLET, FILM COATED ORAL NIGHTLY
Status: DISCONTINUED | OUTPATIENT
Start: 2019-08-31 | End: 2019-09-01 | Stop reason: HOSPADM

## 2019-08-31 RX ORDER — ATENOLOL 25 MG/1
25 TABLET ORAL DAILY
Status: DISCONTINUED | OUTPATIENT
Start: 2019-08-31 | End: 2019-09-01 | Stop reason: HOSPADM

## 2019-08-31 RX ORDER — BISACODYL 5 MG/1
5 TABLET, DELAYED RELEASE ORAL DAILY PRN
Status: DISCONTINUED | OUTPATIENT
Start: 2019-08-31 | End: 2019-09-01 | Stop reason: HOSPADM

## 2019-08-31 RX ORDER — LANOLIN ALCOHOL/MO/W.PET/CERES
3 CREAM (GRAM) TOPICAL NIGHTLY PRN
Status: DISCONTINUED | OUTPATIENT
Start: 2019-08-31 | End: 2019-09-01 | Stop reason: HOSPADM

## 2019-08-31 RX ORDER — FINASTERIDE 5 MG/1
5 TABLET, FILM COATED ORAL DAILY
Status: DISCONTINUED | OUTPATIENT
Start: 2019-08-31 | End: 2019-08-31

## 2019-08-31 RX ORDER — TAMSULOSIN HYDROCHLORIDE 0.4 MG/1
0.4 CAPSULE ORAL DAILY
Status: DISCONTINUED | OUTPATIENT
Start: 2019-08-31 | End: 2019-08-31

## 2019-08-31 RX ORDER — ASPIRIN 325 MG
325 TABLET ORAL ONCE
Status: COMPLETED | OUTPATIENT
Start: 2019-08-31 | End: 2019-08-31

## 2019-08-31 RX ORDER — SODIUM CHLORIDE 0.9 % (FLUSH) 0.9 %
10 SYRINGE (ML) INJECTION EVERY 12 HOURS SCHEDULED
Status: DISCONTINUED | OUTPATIENT
Start: 2019-08-31 | End: 2019-09-01 | Stop reason: HOSPADM

## 2019-08-31 RX ORDER — TAMSULOSIN HYDROCHLORIDE 0.4 MG/1
0.4 CAPSULE ORAL DAILY
Status: DISCONTINUED | OUTPATIENT
Start: 2019-08-31 | End: 2019-09-01 | Stop reason: HOSPADM

## 2019-08-31 RX ORDER — ATENOLOL 25 MG/1
25 TABLET ORAL DAILY
Status: DISCONTINUED | OUTPATIENT
Start: 2019-08-31 | End: 2019-08-31

## 2019-08-31 RX ORDER — ACETAMINOPHEN 325 MG/1
650 TABLET ORAL EVERY 4 HOURS PRN
Status: DISCONTINUED | OUTPATIENT
Start: 2019-08-31 | End: 2019-09-01 | Stop reason: HOSPADM

## 2019-08-31 RX ORDER — NITROGLYCERIN 0.4 MG/1
0.4 TABLET SUBLINGUAL
Status: DISCONTINUED | OUTPATIENT
Start: 2019-08-31 | End: 2019-09-01 | Stop reason: HOSPADM

## 2019-08-31 RX ORDER — ACETAMINOPHEN 650 MG/1
650 SUPPOSITORY RECTAL EVERY 4 HOURS PRN
Status: DISCONTINUED | OUTPATIENT
Start: 2019-08-31 | End: 2019-09-01 | Stop reason: HOSPADM

## 2019-08-31 RX ORDER — LOSARTAN POTASSIUM 25 MG/1
25 TABLET ORAL DAILY
Status: DISCONTINUED | OUTPATIENT
Start: 2019-08-31 | End: 2019-08-31

## 2019-08-31 RX ORDER — SODIUM CHLORIDE 0.9 % (FLUSH) 0.9 %
10 SYRINGE (ML) INJECTION AS NEEDED
Status: DISCONTINUED | OUTPATIENT
Start: 2019-08-31 | End: 2019-09-01 | Stop reason: HOSPADM

## 2019-08-31 RX ORDER — PANTOPRAZOLE SODIUM 40 MG/1
40 TABLET, DELAYED RELEASE ORAL
Status: DISCONTINUED | OUTPATIENT
Start: 2019-09-01 | End: 2019-09-01 | Stop reason: HOSPADM

## 2019-08-31 RX ORDER — ACETAMINOPHEN 160 MG/5ML
650 SOLUTION ORAL EVERY 4 HOURS PRN
Status: DISCONTINUED | OUTPATIENT
Start: 2019-08-31 | End: 2019-09-01 | Stop reason: HOSPADM

## 2019-08-31 RX ADMIN — LOSARTAN POTASSIUM 25 MG: 25 TABLET ORAL at 20:51

## 2019-08-31 RX ADMIN — NITROGLYCERIN 0.4 MG: 0.4 TABLET SUBLINGUAL at 12:00

## 2019-08-31 RX ADMIN — APIXABAN 5 MG: 5 TABLET, FILM COATED ORAL at 20:51

## 2019-08-31 RX ADMIN — FINASTERIDE 5 MG: 5 TABLET, FILM COATED ORAL at 20:51

## 2019-08-31 RX ADMIN — ATORVASTATIN CALCIUM 20 MG: 10 TABLET, FILM COATED ORAL at 20:51

## 2019-08-31 RX ADMIN — TAMSULOSIN HYDROCHLORIDE 0.4 MG: 0.4 CAPSULE ORAL at 20:51

## 2019-08-31 RX ADMIN — ASPIRIN 325 MG: 325 TABLET ORAL at 10:46

## 2019-08-31 RX ADMIN — DILTIAZEM HYDROCHLORIDE 5 MG/HR: 5 INJECTION INTRAVENOUS at 11:17

## 2019-08-31 RX ADMIN — SODIUM CHLORIDE, PRESERVATIVE FREE 10 ML: 5 INJECTION INTRAVENOUS at 20:51

## 2019-08-31 RX ADMIN — METOPROLOL TARTRATE 5 MG: 5 INJECTION, SOLUTION INTRAVENOUS at 15:28

## 2019-08-31 RX ADMIN — PERFLUTREN 8.48 MG: 6.52 INJECTION, SUSPENSION INTRAVENOUS at 14:53

## 2019-08-31 RX ADMIN — Medication 10 MCG/KG/MIN: at 14:53

## 2019-08-31 RX ADMIN — ATROPINE SULFATE 0.6 MG: 0.1 INJECTION PARENTERAL at 15:23

## 2019-08-31 RX ADMIN — ATENOLOL 25 MG: 25 TABLET ORAL at 20:53

## 2019-08-31 RX ADMIN — APIXABAN 5 MG: 5 TABLET, FILM COATED ORAL at 14:36

## 2019-09-01 VITALS
OXYGEN SATURATION: 95 % | BODY MASS INDEX: 33.63 KG/M2 | DIASTOLIC BLOOD PRESSURE: 57 MMHG | HEART RATE: 54 BPM | WEIGHT: 227.07 LBS | TEMPERATURE: 98.4 F | SYSTOLIC BLOOD PRESSURE: 110 MMHG | RESPIRATION RATE: 18 BRPM | HEIGHT: 69 IN

## 2019-09-01 LAB — T3FREE SERPL-MCNC: 2.9 PG/ML (ref 2–4.4)

## 2019-09-01 PROCEDURE — 94799 UNLISTED PULMONARY SVC/PX: CPT

## 2019-09-01 PROCEDURE — G0378 HOSPITAL OBSERVATION PER HR: HCPCS

## 2019-09-01 PROCEDURE — 94760 N-INVAS EAR/PLS OXIMETRY 1: CPT

## 2019-09-01 RX ADMIN — APIXABAN 5 MG: 5 TABLET, FILM COATED ORAL at 08:36

## 2019-09-01 RX ADMIN — PANTOPRAZOLE SODIUM 40 MG: 40 TABLET, DELAYED RELEASE ORAL at 05:50

## 2019-09-01 RX ADMIN — ASPIRIN 81 MG: 81 TABLET ORAL at 08:36

## 2019-09-01 RX ADMIN — SODIUM CHLORIDE, PRESERVATIVE FREE 10 ML: 5 INJECTION INTRAVENOUS at 08:36

## 2019-09-01 NOTE — DISCHARGE SUMMARY
[facility]  DISCHARGE SUMMARY       Date of Admission: 8/31/2019  Date of Discharge:  9/1/2019  Primary Care Physician: Randi Parks MD    Discharge Diagnoses:  Patient Active Problem List   Diagnosis   • New onset atrial fibrillation (CMS/HCC)   • Coronary artery disease   • Hypertension   • Hyperlipidemia         Presenting Problem/History of Present Illness:  New onset atrial fibrillation (CMS/HCC) [I48.91]     Chief Complaint on Day of Discharge:   No complaint    History of Present Illness on Day of Discharge:   The patient has had no further discomfort or no further complaints.  He has converted to normal sinus rhythm spontaneously.  Stress echo performed yesterday was low risk for ischemia.  The patient is stable for discharge on anticoagulant therapy.  He is to follow-up with his primary care physician next week and with Dr. Lorenzana in 3 weeks.    Hospital Course  This 63-year-old white male was admitted with a chief complaint of chest tightness.  The patient states that the symptoms began yesterday evening.  He also states that the chest tightness worsens with exertion.  This morning the patient began to have associated nausea and weakness and presented to the emergency department for further evaluation.  The patient has a history of coronary artery disease with a CABG in 2003 and because of his previous history he felt as though he should be further evaluated.     In the emergency department chest x-ray shows no acute cardiopulmonary disease.  2 troponins were negative.  D-dimer and proBNP was within normal limits.  CBC within normal limits and CMP within normal limits as well.  Telemetry monitoring and EKG in the emergency department revealed atrial fibrillation which is of new onset.    PLAN:   Admit to the medical surgical floor  Initiate Eliquis 5 mg p.o. twice daily  Stress echo  Serial troponins  Telemetry monitoring    The patient has had no  further discomfort or no further complaints.  He has converted to normal sinus rhythm spontaneously.  Stress echo performed yesterday was low risk for ischemia.  The patient is stable for discharge on anticoagulant therapy.  He is to follow-up with his primary care physician next week and with Dr. Lorenzana in 3 weeks.      Pertinent Test Results:   Lab Results (last 7 days)     Procedure Component Value Units Date/Time    T3, Free [[id number]] Collected:  08/31/19 1042    Specimen:  Blood Updated:  09/01/19 0808     T3, Free 2.9 pg/mL     Narrative:       Performed at:  [address] [place] [address]  [id number]  : Jc Ornelas PhD, Phone:  [phone]    Troponin [[id number]]  (Normal) Collected:  08/31/19 1321    Specimen:  Blood Updated:  08/31/19 1354     Troponin I <0.012 ng/mL     TSH [[id number]]  (Normal) Collected:  08/31/19 1045    Specimen:  Blood Updated:  08/31/19 1221     TSH 1.730 uIU/mL     T4, Free [[id number]]  (Normal) Collected:  08/31/19 1045    Specimen:  Blood Updated:  08/31/19 1207     Free T4 0.95 ng/dL     Troponin [[id number]]  (Normal) Collected:  08/31/19 1042    Specimen:  Blood Updated:  08/31/19 1119     Troponin I <0.012 ng/mL     BNP [[id number]]  (Normal) Collected:  08/31/19 1042    Specimen:  Blood Updated:  08/31/19 1119     proBNP 869.0 pg/mL     Comprehensive Metabolic Panel [[id number]]  (Abnormal) Collected:  08/31/19 1042    Specimen:  Blood Updated:  08/31/19 1114     Glucose 104 mg/dL      BUN 12 mg/dL      Creatinine 0.85 mg/dL      Sodium 138 mmol/L      Potassium 4.3 mmol/L      Comment: Specimen hemolyzed.  Results may be affected.        Chloride 105 mmol/L      CO2 27.0 mmol/L      Calcium 9.5 mg/dL      Total Protein 7.2 g/dL      Albumin 4.30 g/dL      ALT (SGPT) 36 U/L      Comment: Specimen hemolyzed.  Results may be affected.        AST (SGOT) 31 U/L      Comment: Specimen hemolyzed.  Results may be affected.        Alkaline Phosphatase  "42 U/L      Comment: Specimen hemolyzed. Results may be affected.        Total Bilirubin 0.7 mg/dL      eGFR Non African Amer 90 mL/min/1.73      Globulin 2.9 gm/dL      A/G Ratio 1.5 g/dL      BUN/Creatinine Ratio 14.1     Anion Gap 6.0 mmol/L     D-dimer, Quantitative [[id number]]  (Normal) Collected:  08/31/19 1042    Specimen:  Blood Updated:  08/31/19 1104     D-Dimer, Quantitative 0.43 mg/L (FEU)     CBC Auto Differential [[id number]]  (Abnormal) Collected:  08/31/19 1042    Specimen:  Blood Updated:  08/31/19 1054     WBC 10.54 10*3/mm3      RBC 4.75 10*6/mm3      Hemoglobin 14.4 g/dL      Hematocrit 42.0 %      MCV 88.4 fL      MCH 30.3 pg      MCHC 34.3 g/dL      RDW 13.3 %      RDW-SD 43.0 fl      MPV 11.1 fL      Platelets 209 10*3/mm3      Neutrophil % 69.3 %      Lymphocyte % 17.7 %      Monocyte % 11.2 %      Eosinophil % 1.2 %      Basophil % 0.3 %      Immature Grans % 0.3 %      Neutrophils, Absolute 7.30 10*3/mm3      Lymphocytes, Absolute 1.87 10*3/mm3      Monocytes, Absolute 1.18 10*3/mm3      Eosinophils, Absolute 0.13 10*3/mm3      Basophils, Absolute 0.03 10*3/mm3      Immature Grans, Absolute 0.03 10*3/mm3      nRBC 0.0 /100 WBC             Condition on Discharge:    Stable and in normal sinus rhythm    Physical Exam on Discharge:  /57 (BP Location: Right arm, Patient Position: Lying)   Pulse 54   Temp 98.4 °F (36.9 °C) (Oral)   Resp 18   Ht 175.3 cm (69.02\")   Wt 103 kg (227 lb 1.2 oz)   SpO2 95%   BMI 33.52 kg/m²   Physical Exam  Constitutional: He is oriented to person, place, and time. Vital signs are normal. He appears well-developed and well-nourished. He is cooperative.   HENT:   Head: Normocephalic and atraumatic.   Right Ear: External ear normal.   Left Ear: External ear normal.   Nose: Nose normal.   Mouth/Throat: Oropharynx is clear and moist.   Eyes: EOM are normal. Pupils are equal, round, and reactive to light. No scleral icterus.   Neck: Normal range of motion. Neck " supple. No JVD present.   Cardiovascular: Normal rate, normal heart sounds and intact distal pulses.   Normal sinus rhythm present.   No murmur heard.  Pulmonary/Chest: Effort normal and breath sounds normal. No respiratory distress.   Abdominal: Soft. Bowel sounds are normal. He exhibits no mass. There is no tenderness.   Musculoskeletal: Normal range of motion. He exhibits no edema or tenderness.   Neurological: He is alert and oriented to person, place, and time. No cranial nerve deficit. He exhibits normal muscle tone.   Skin: Skin is warm and dry. No rash noted.   Psychiatric: He has a normal mood and affect.    Discharge Disposition:  Home or Self Care    Discharge Medications:     Discharge Medications      New Medications      Instructions Start Date   apixaban 5 MG tablet tablet  Commonly known as:  ELIQUIS   5 mg, Oral, Every 12 Hours Scheduled         Continue These Medications      Instructions Start Date   aspirin 81 MG EC tablet   81 mg, Oral, Daily      atenolol 25 MG tablet  Commonly known as:  TENORMIN   25 mg, Oral, Daily      coenzyme Q10 100 MG capsule   100 mg, Oral, Daily      finasteride 5 MG tablet  Commonly known as:  PROSCAR   5 mg, Oral, Daily      Krill Oil 1000 MG capsule   1 capsule, Oral, Daily      losartan 25 MG tablet  Commonly known as:  COZAAR   25 mg, Oral, Daily      MULTIVITAMIN ADULT PO   1 tablet, Oral, Daily      Omega-3 1000 MG capsule   1 capsule, Oral, Daily      omeprazole 20 MG capsule  Commonly known as:  priLOSEC   20 mg, Oral, Daily      propranolol 20 MG tablet  Commonly known as:  INDERAL   20 mg, Oral, Daily      simvastatin 40 MG tablet  Commonly known as:  ZOCOR   40 mg, Oral, Nightly      tamsulosin 0.4 MG capsule 24 hr capsule  Commonly known as:  FLOMAX   1 capsule, Oral, Daily             Discharge Diet:   Diet Instructions     Diet: Regular; Thin      Discharge Diet:  Regular    Fluid Consistency:  Thin          Discharge Care Plan / Instructions:    Discharge home    Activity at Discharge:   Activity Instructions     Activity as Tolerated            Follow-up Appointments:  Follow-up with primary care physician, Dr. Parks next week  Follow-up with Dr. Lorenzana and 3 weeks       Dony Cardona DO  09/01/19  12:38 PM    Time: Discharge Less than 30 min    Please note that portions of this note may have been completed with a voice recognition program. Efforts were made to edit the dictations, but occasionally words are mistranscribed.

## 2019-09-01 NOTE — PLAN OF CARE
Problem: Arrhythmia/Dysrhythmia (Symptomatic) (Adult)  Goal: Signs and Symptoms of Listed Potential Problems Will be Absent, Minimized or Managed (Arrhythmia/Dysrhythmia)  Outcome: Ongoing (interventions implemented as appropriate)      Problem: Cardiac: ACS (Acute Coronary Syndrome) (Adult)  Goal: Signs and Symptoms of Listed Potential Problems Will be Absent, Minimized or Managed (Cardiac: ACS)  Outcome: Ongoing (interventions implemented as appropriate)      Problem: Patient Care Overview  Goal: Plan of Care Review  Outcome: Ongoing (interventions implemented as appropriate)   09/01/19 0312   Coping/Psychosocial   Plan of Care Reviewed With patient   Plan of Care Review   Progress improving   OTHER   Outcome Summary VSS, no c/o pain, converted to sinus at 17:51 has been SB-S 55-63 1 degree

## 2019-09-03 ENCOUNTER — TELEPHONE (OUTPATIENT)
Dept: INTERNAL MEDICINE | Age: 64
End: 2019-09-03

## 2019-09-03 DIAGNOSIS — G89.29 CHRONIC LOW BACK PAIN WITHOUT SCIATICA, UNSPECIFIED BACK PAIN LATERALITY: Primary | ICD-10-CM

## 2019-09-03 DIAGNOSIS — R30.0 DYSURIA: Primary | ICD-10-CM

## 2019-09-03 DIAGNOSIS — M54.50 CHRONIC LOW BACK PAIN WITHOUT SCIATICA, UNSPECIFIED BACK PAIN LATERALITY: Primary | ICD-10-CM

## 2019-09-03 LAB
BILIRUBIN URINE: NEGATIVE
BLOOD, URINE: NEGATIVE
CLARITY: CLEAR
COLOR: YELLOW
GLUCOSE URINE: NEGATIVE MG/DL
KETONES, URINE: NEGATIVE MG/DL
LEUKOCYTE ESTERASE, URINE: NEGATIVE
NITRITE, URINE: NEGATIVE
PH UA: 6.5 (ref 5–8)
PROTEIN UA: NEGATIVE MG/DL
SPECIFIC GRAVITY UA: 1.01 (ref 1–1.03)
URINE REFLEX TO CULTURE: NORMAL
UROBILINOGEN, URINE: 0.2 E.U./DL

## 2019-09-06 ENCOUNTER — OFFICE VISIT (OUTPATIENT)
Dept: INTERNAL MEDICINE | Age: 64
End: 2019-09-06
Payer: MEDICARE

## 2019-09-06 VITALS
DIASTOLIC BLOOD PRESSURE: 62 MMHG | SYSTOLIC BLOOD PRESSURE: 112 MMHG | WEIGHT: 225.8 LBS | HEART RATE: 63 BPM | OXYGEN SATURATION: 97 % | BODY MASS INDEX: 32.33 KG/M2 | HEIGHT: 70 IN

## 2019-09-06 DIAGNOSIS — E78.00 PURE HYPERCHOLESTEROLEMIA: ICD-10-CM

## 2019-09-06 DIAGNOSIS — R97.20 ELEVATED PSA: ICD-10-CM

## 2019-09-06 DIAGNOSIS — I48.91 NEW ONSET ATRIAL FIBRILLATION (HCC): Primary | ICD-10-CM

## 2019-09-06 DIAGNOSIS — R73.9 HYPERGLYCEMIA: ICD-10-CM

## 2019-09-06 DIAGNOSIS — G47.10 HYPERSOMNIA: ICD-10-CM

## 2019-09-06 DIAGNOSIS — Z12.5 SCREENING FOR PROSTATE CANCER: ICD-10-CM

## 2019-09-06 DIAGNOSIS — I48.0 PAROXYSMAL ATRIAL FIBRILLATION (HCC): ICD-10-CM

## 2019-09-06 DIAGNOSIS — I25.10 CORONARY ARTERY DISEASE INVOLVING NATIVE CORONARY ARTERY OF NATIVE HEART WITHOUT ANGINA PECTORIS: ICD-10-CM

## 2019-09-06 PROCEDURE — 1111F DSCHRG MED/CURRENT MED MERGE: CPT | Performed by: INTERNAL MEDICINE

## 2019-09-06 PROCEDURE — 99495 TRANSJ CARE MGMT MOD F2F 14D: CPT | Performed by: INTERNAL MEDICINE

## 2019-09-06 RX ORDER — ROSUVASTATIN CALCIUM 40 MG/1
40 TABLET, COATED ORAL DAILY
Qty: 90 TABLET | Refills: 3 | Status: SHIPPED | OUTPATIENT
Start: 2019-09-06 | End: 2019-09-06

## 2019-09-06 ASSESSMENT — PATIENT HEALTH QUESTIONNAIRE - PHQ9
1. LITTLE INTEREST OR PLEASURE IN DOING THINGS: 0
SUM OF ALL RESPONSES TO PHQ QUESTIONS 1-9: 0
SUM OF ALL RESPONSES TO PHQ QUESTIONS 1-9: 0
SUM OF ALL RESPONSES TO PHQ9 QUESTIONS 1 & 2: 0
2. FEELING DOWN, DEPRESSED OR HOPELESS: 0

## 2019-09-18 PROBLEM — I48.91 NEW ONSET ATRIAL FIBRILLATION (HCC): Status: ACTIVE | Noted: 2019-09-18

## 2019-09-18 PROBLEM — I48.0 PAROXYSMAL ATRIAL FIBRILLATION (HCC): Status: ACTIVE | Noted: 2019-09-18

## 2019-09-18 ASSESSMENT — ENCOUNTER SYMPTOMS
EYE DISCHARGE: 0
ABDOMINAL DISTENTION: 0
SINUS PRESSURE: 0
SHORTNESS OF BREATH: 0
ABDOMINAL PAIN: 0
COUGH: 0
BACK PAIN: 0
EYE REDNESS: 0

## 2019-09-24 ENCOUNTER — OFFICE VISIT (OUTPATIENT)
Dept: CARDIOLOGY | Facility: CLINIC | Age: 64
End: 2019-09-24

## 2019-09-24 VITALS
SYSTOLIC BLOOD PRESSURE: 118 MMHG | HEIGHT: 69 IN | BODY MASS INDEX: 33.47 KG/M2 | HEART RATE: 50 BPM | WEIGHT: 226 LBS | DIASTOLIC BLOOD PRESSURE: 68 MMHG

## 2019-09-24 DIAGNOSIS — E66.09 CLASS 1 OBESITY DUE TO EXCESS CALORIES WITH SERIOUS COMORBIDITY AND BODY MASS INDEX (BMI) OF 33.0 TO 33.9 IN ADULT: ICD-10-CM

## 2019-09-24 DIAGNOSIS — I25.10 CORONARY ARTERY DISEASE INVOLVING NATIVE CORONARY ARTERY OF NATIVE HEART WITHOUT ANGINA PECTORIS: Primary | ICD-10-CM

## 2019-09-24 DIAGNOSIS — Z95.1 S/P CABG X 3: ICD-10-CM

## 2019-09-24 DIAGNOSIS — Z79.01 CURRENT USE OF LONG TERM ANTICOAGULATION: ICD-10-CM

## 2019-09-24 DIAGNOSIS — E78.2 MIXED HYPERLIPIDEMIA: ICD-10-CM

## 2019-09-24 DIAGNOSIS — I10 ESSENTIAL HYPERTENSION: ICD-10-CM

## 2019-09-24 DIAGNOSIS — I48.0 PAF (PAROXYSMAL ATRIAL FIBRILLATION) (HCC): ICD-10-CM

## 2019-09-24 PROBLEM — E66.811 CLASS 1 OBESITY DUE TO EXCESS CALORIES WITH SERIOUS COMORBIDITY AND BODY MASS INDEX (BMI) OF 33.0 TO 33.9 IN ADULT: Status: ACTIVE | Noted: 2019-09-24

## 2019-09-24 PROCEDURE — 93000 ELECTROCARDIOGRAM COMPLETE: CPT | Performed by: NURSE PRACTITIONER

## 2019-09-24 PROCEDURE — 99214 OFFICE O/P EST MOD 30 MIN: CPT | Performed by: NURSE PRACTITIONER

## 2019-09-24 NOTE — PATIENT INSTRUCTIONS
Obesity, Adult  Obesity is the condition of having too much total body fat. Being overweight or obese means that your weight is greater than what is considered healthy for your body size. Obesity is determined by a measurement called BMI. BMI is an estimate of body fat and is calculated from height and weight. For adults, a BMI of 30 or higher is considered obese.  Obesity can eventually lead to other health concerns and major illnesses, including:  · Stroke.  · Coronary artery disease (CAD).  · Type 2 diabetes.  · Some types of cancer, including cancers of the colon, breast, uterus, and gallbladder.  · Osteoarthritis.  · High blood pressure (hypertension).  · High cholesterol.  · Sleep apnea.  · Gallbladder stones.  · Infertility problems.  What are the causes?  The main cause of obesity is taking in (consuming) more calories than your body uses for energy. Other factors that contribute to this condition may include:  · Being born with genes that make you more likely to become obese.  · Having a medical condition that causes obesity. These conditions include:  ? Hypothyroidism.  ? Polycystic ovarian syndrome (PCOS).  ? Binge-eating disorder.  ? Cushing syndrome.  · Taking certain medicines, such as steroids, antidepressants, and seizure medicines.  · Not being physically active (sedentary lifestyle).  · Living where there are limited places to exercise safely or buy healthy foods.  · Not getting enough sleep.  What increases the risk?  The following factors may increase your risk of this condition:  · Having a family history of obesity.  · Being a woman of -American descent.  · Being a man of  descent.  What are the signs or symptoms?  Having excessive body fat is the main symptom of this condition.  How is this diagnosed?  This condition may be diagnosed based on:  · Your symptoms.  · Your medical history.  · A physical exam. Your health care provider may measure:  ? Your BMI. If you are an adult  with a BMI between 25 and less than 30, you are considered overweight. If you are an adult with a BMI of 30 or higher, you are considered obese.  ? The distances around your hips and your waist (circumferences). These may be compared to each other to help diagnose your condition.  ? Your skinfold thickness. Your health care provider may gently pinch a fold of your skin and measure it.  How is this treated?  Treatment for this condition often includes changing your lifestyle. Treatment may include some or all of the following:  · Dietary changes. Work with your health care provider and a dietitian to set a weight-loss goal that is healthy and reasonable for you. Dietary changes may include eating:  ? Smaller portions. A portion size is the amount of a particular food that is healthy for you to eat at one time. This varies from person to person.  ? Low-calorie or low-fat options.  ? More whole grains, fruits, and vegetables.  · Regular physical activity. This may include aerobic activity (cardio) and strength training.  · Medicine to help you lose weight. Your health care provider may prescribe medicine if you are unable to lose 1 pound a week after 6 weeks of eating more healthily and doing more physical activity.  · Surgery. Surgical options may include gastric banding and gastric bypass. Surgery may be done if:  ? Other treatments have not helped to improve your condition.  ? You have a BMI of 40 or higher.  ? You have life-threatening health problems related to obesity.  Follow these instructions at home:    Eating and drinking    · Follow recommendations from your health care provider about what you eat and drink. Your health care provider may advise you to:  ? Limit fast foods, sweets, and processed snack foods.  ? Choose low-fat options, such as low-fat milk instead of whole milk.  ? Eat 5 or more servings of fruits or vegetables every day.  ? Eat at home more often. This gives you more control over what you  eat.  ? Choose healthy foods when you eat out.  ? Learn what a healthy portion size is.  ? Keep low-fat snacks on hand.  ? Avoid sugary drinks, such as soda, fruit juice, iced tea sweetened with sugar, and flavored milk.  ? Eat a healthy breakfast.  · Drink enough water to keep your urine clear or pale yellow.  · Do not go without eating for long periods of time (do not fast) or follow a fad diet. Fasting and fad diets can be unhealthy and even dangerous.  Physical Activity  · Exercise regularly, as told by your health care provider. Ask your health care provider what types of exercise are safe for you and how often you should exercise.  · Warm up and stretch before being active.  · Cool down and stretch after being active.  · Rest between periods of activity.  Lifestyle  · Limit the time that you spend in front of your TV, computer, or video game system.  · Find ways to reward yourself that do not involve food.  · Limit alcohol intake to no more than 1 drink a day for nonpregnant women and 2 drinks a day for men. One drink equals 12 oz of beer, 5 oz of wine, or 1½ oz of hard liquor.  General instructions  · Keep a weight loss journal to keep track of the food you eat and how much you exercise you get.  · Take over-the-counter and prescription medicines only as told by your health care provider.  · Take vitamins and supplements only as told by your health care provider.  · Consider joining a support group. Your health care provider may be able to recommend a support group.  · Keep all follow-up visits as told by your health care provider. This is important.  Contact a health care provider if:  · You are unable to meet your weight loss goal after 6 weeks of dietary and lifestyle changes.  This information is not intended to replace advice given to you by your health care provider. Make sure you discuss any questions you have with your health care provider.  Document Released: 01/25/2006 Document Revised: 05/22/2017  Document Reviewed: 10/05/2016  [employer] Interactive Patient Education © 2019 [employer]    Heart-Healthy Eating Plan  Many factors influence your heart (coronary) health, including eating and exercise habits. Coronary risk increases with abnormal blood fat (lipid) levels. Heart-healthy meal planning includes limiting unhealthy fats, increasing healthy fats, and making other diet and lifestyle changes.  What is my plan?  Your health care provider may recommend that you:  · Limit your fat intake to _________% or less of your total calories each day.  · Limit your saturated fat intake to _________% or less of your total calories each day.  · Limit the amount of cholesterol in your diet to less than _________ mg per day.  What are tips for following this plan?  Cooking  Cook foods using methods other than frying. Baking, boiling, grilling, and broiling are all good options. Other ways to reduce fat include:  · Removing the skin from poultry.  · Removing all visible fats from meats.  · Steaming vegetables in water or broth.  Meal planning    · At meals, imagine dividing your plate into fourths:  ? Fill one-half of your plate with vegetables and green salads.  ? Fill one-fourth of your plate with whole grains.  ? Fill one-fourth of your plate with lean protein foods.  · Eat 4-5 servings of vegetables per day. One serving equals 1 cup raw or cooked vegetable, or 2 cups raw leafy greens.  · Eat 4-5 servings of fruit per day. One serving equals 1 medium whole fruit, ¼ cup dried fruit, ½ cup fresh, frozen, or canned fruit, or ½ cup 100% fruit juice.  · Eat more foods that contain soluble fiber. Examples include apples, broccoli, carrots, beans, peas, and barley. Aim to get 25-30 g of fiber per day.  · Increase your consumption of legumes, nuts, and seeds to 4-5 servings per week. One serving of dried beans or legumes equals ½ cup cooked, 1 serving of nuts is ¼ cup, and 1 serving of seeds equals 1  tablespoon.  Fats  · Choose healthy fats more often. Choose monounsaturated and polyunsaturated fats, such as olive and canola oils, flaxseeds, walnuts, almonds, and seeds.  · Eat more omega-3 fats. Choose salmon, mackerel, sardines, tuna, flaxseed oil, and ground flaxseeds. Aim to eat fish at least 2 times each week.  · Check food labels carefully to identify foods with trans fats or high amounts of saturated fat.  · Limit saturated fats. These are found in animal products, such as meats, butter, and cream. Plant sources of saturated fats include palm oil, palm kernel oil, and coconut oil.  · Avoid foods with partially hydrogenated oils in them. These contain trans fats. Examples are stick margarine, some tub margarines, cookies, crackers, and other baked goods.  · Avoid fried foods.  General information  · Eat more home-cooked food and less restaurant, buffet, and fast food.  · Limit or avoid alcohol.  · Limit foods that are high in starch and sugar.  · Lose weight if you are overweight. Losing just 5-10% of your body weight can help your overall health and prevent diseases such as diabetes and heart disease.  · Monitor your salt (sodium) intake, especially if you have high blood pressure. Talk with your health care provider about your sodium intake.  · Try to incorporate more vegetarian meals weekly.  What foods can I eat?  Fruits  All fresh, canned (in natural juice), or frozen fruits.  Vegetables  Fresh or frozen vegetables (raw, steamed, roasted, or grilled). Green salads.  Grains  Most grains. Choose whole wheat and whole grains most of the time. Rice and pasta, including brown rice and pastas made with whole wheat.  Meats and other proteins  Lean, well-trimmed beef, veal, pork, and lamb. Chicken and turkey without skin. All fish and shellfish. Wild duck, rabbit, pheasant, and venison. Egg whites or low-cholesterol egg substitutes. Dried beans, peas, lentils, and tofu. Seeds and most nuts.  Dairy  Low-fat or  nonfat cheeses, including ricotta and mozzarella. Skim or 1% milk (liquid, powdered, or evaporated). Buttermilk made with low-fat milk. Nonfat or low-fat yogurt.  Fats and oils  Non-hydrogenated (trans-free) margarines. Vegetable oils, including soybean, sesame, sunflower, olive, peanut, safflower, corn, canola, and cottonseed. Salad dressings or mayonnaise made with a vegetable oil.  Beverages  Water (mineral or sparkling). Coffee and tea. Diet carbonated beverages.  Sweets and desserts  Sherbet, gelatin, and fruit ice. Small amounts of dark chocolate.  Limit all sweets and desserts.  Seasonings and condiments  All seasonings and condiments.  The items listed above may not be a complete list of foods and beverages you can eat. Contact a dietitian for more options.  What foods are not recommended?  Fruits  Canned fruit in heavy syrup. Fruit in cream or butter sauce. Fried fruit. Limit coconut.  Vegetables  Vegetables cooked in cheese, cream, or butter sauce. Fried vegetables.  Grains  Breads made with saturated or trans fats, oils, or whole milk. Croissants. Sweet rolls. Donuts. High-fat crackers, such as cheese crackers.  Meats and other proteins  Fatty meats, such as hot dogs, ribs, sausage, chow, rib-eye roast or steak. High-fat deli meats, such as salami and bologna. Caviar. Domestic duck and goose. Organ meats, such as liver.  Dairy  Cream, sour cream, cream cheese, and creamed cottage cheese. Whole milk cheeses. Whole or 2% milk (liquid, evaporated, or condensed). Whole buttermilk. Cream sauce or high-fat cheese sauce. Whole-milk yogurt.  Fats and oils  Meat fat, or shortening. Cocoa butter, hydrogenated oils, palm oil, coconut oil, palm kernel oil. Solid fats and shortenings, including chow fat, salt pork, lard, and butter. Nondairy cream substitutes. Salad dressings with cheese or sour cream.  Beverages  Regular sodas and any drinks with added sugar.  Sweets and desserts  Frosting. Pudding. Cookies. Cakes.  Pies. Milk chocolate or white chocolate. Buttered syrups. Full-fat ice cream or ice cream drinks.  The items listed above may not be a complete list of foods and beverages to avoid. Contact a dietitian for more information.  Summary  · Heart-healthy meal planning includes limiting unhealthy fats, increasing healthy fats, and making other diet and lifestyle changes.  · Lose weight if you are overweight. Losing just 5-10% of your body weight can help your overall health and prevent diseases such as diabetes and heart disease.  · Focus on eating a balance of foods, including fruits and vegetables, low-fat or nonfat dairy, lean protein, nuts and legumes, whole grains, and heart-healthy oils and fats.  This information is not intended to replace advice given to you by your health care provider. Make sure you discuss any questions you have with your health care provider.  Document Released: 09/26/2009 Document Revised: 01/25/2019 Document Reviewed: 01/25/2019  [employer] Interactive Patient Education © 2019 [employer]    Exercising to Lose Weight  Exercise is structured, repetitive physical activity to improve fitness and health. Getting regular exercise is important for everyone. It is especially important if you are overweight. Being overweight increases your risk of heart disease, stroke, diabetes, high blood pressure, and several types of cancer. Reducing your calorie intake and exercising can help you lose weight.  Exercise is usually categorized as moderate or vigorous intensity. To lose weight, most people need to do a certain amount of moderate-intensity or vigorous-intensity exercise each week.  Moderate-intensity exercise    Moderate-intensity exercise is any activity that gets you moving enough to burn at least three times more energy (calories) than if you were sitting.  Examples of moderate exercise include:  · Walking a mile in 15 minutes.  · Doing light yard work.  · Biking at an easy pace.  Most people  should get at least 150 minutes (2 hours and 30 minutes) a week of moderate-intensity exercise to maintain their body weight.  Vigorous-intensity exercise  Vigorous-intensity exercise is any activity that gets you moving enough to burn at least six times more calories than if you were sitting. When you exercise at this intensity, you should be working hard enough that you are not able to carry on a conversation.  Examples of vigorous exercise include:  · Running.  · Playing a team sport, such as football, basketball, and soccer.  · Jumping rope.  Most people should get at least 75 minutes (1 hour and 15 minutes) a week of vigorous-intensity exercise to maintain their body weight.  How can exercise affect me?  When you exercise enough to burn more calories than you eat, you lose weight. Exercise also reduces body fat and builds muscle. The more muscle you have, the more calories you burn. Exercise also:  · Improves mood.  · Reduces stress and tension.  · Improves your overall fitness, flexibility, and endurance.  · Increases bone strength.  The amount of exercise you need to lose weight depends on:  · Your age.  · The type of exercise.  · Any health conditions you have.  · Your overall physical ability.  Talk to your health care provider about how much exercise you need and what types of activities are safe for you.  What actions can I take to lose weight?  Nutrition    · Make changes to your diet as told by your health care provider or diet and nutrition specialist (dietitian). This may include:  ? Eating fewer calories.  ? Eating more protein.  ? Eating less unhealthy fats.  ? Eating a diet that includes fresh fruits and vegetables, whole grains, low-fat dairy products, and lean protein.  ? Avoiding foods with added fat, salt, and sugar.  · Drink plenty of water while you exercise to prevent dehydration or heat stroke.  Activity  · Choose an activity that you enjoy and set realistic goals. Your health care provider  can help you make an exercise plan that works for you.  · Exercise at a moderate or vigorous intensity most days of the week.  ? The intensity of exercise may vary from person to person. You can tell how intense a workout is for you by paying attention to your breathing and heartbeat. Most people will notice their breathing and heartbeat get faster with more intense exercise.  · Do resistance training twice each week, such as:  ? Push-ups.  ? Sit-ups.  ? Lifting weights.  ? Using resistance bands.  · Getting short amounts of exercise can be just as helpful as long structured periods of exercise. If you have trouble finding time to exercise, try to include exercise in your daily routine.  ? Get up, stretch, and walk around every 30 minutes throughout the day.  ? Go for a walk during your lunch break.  ? Park your car farther away from your destination.  ? If you take public transportation, get off one stop early and walk the rest of the way.  ? Make phone calls while standing up and walking around.  ? Take the stairs instead of elevators or escalators.  · Wear comfortable clothes and shoes with good support.  · Do not exercise so much that you hurt yourself, feel dizzy, or get very short of breath.  Where to find more information  · [place] Department of Health and Human Services: www.hhs.gov  · Centers for Disease Control and Prevention (CDC): www.cdc.gov  Contact a health care provider:  · Before starting a new exercise program.  · If you have questions or concerns about your weight.  · If you have a medical problem that keeps you from exercising.  Get help right away if you have any of the following while exercising:  · Injury.  · Dizziness.  · Difficulty breathing or shortness of breath that does not go away when you stop exercising.  · Chest pain.  · Rapid heartbeat.  Summary  · Being overweight increases your risk of heart disease, stroke, diabetes, high blood pressure, and several types of cancer.  · Losing  weight happens when you burn more calories than you eat.  · Reducing the amount of calories you eat in addition to getting regular moderate or vigorous exercise each week helps you lose weight.  This information is not intended to replace advice given to you by your health care provider. Make sure you discuss any questions you have with your health care provider.  Document Released: 01/20/2012 Document Revised: 12/31/2018 Document Reviewed: 12/31/2018  [employer] Interactive Patient Education © 2019 [employer]

## 2019-09-24 NOTE — PROGRESS NOTES
Subjective:     Encounter Date:09/24/2019      Patient ID: Pawel Thompson is a 64 y.o. male. He presents today for routine follow up of hospital discharge on 9/1/19 for new onset atrial fibrillation. He converted back to sinus rhythm prior to discharge and was started on Eliquis. He has a history of coronary artery disease s/p coronary artery bypass grafting X3 in 2003, hypertension, hyperlipidemia and obesity. He denies chest pain, shortness of breath, palpitations, dizziness, syncope, orthopnea, PND, swelling or decreased stamina. He denies any signs of bleeding.     Chief Complaint: routine follow up  Atrial Fibrillation   Presents for follow-up visit. Symptoms are negative for an AICD problem, bradycardia, chest pain, dizziness, hemodynamic instability, hypertension, hypotension, pacemaker problem, palpitations, shortness of breath, syncope, tachycardia and weakness. The symptoms have been stable. Past medical history includes atrial fibrillation, CAD and hyperlipidemia.   Coronary Artery Disease   Presents for follow-up visit. Pertinent negatives include no chest pain, chest pressure, chest tightness, dizziness, leg swelling, muscle weakness, palpitations, shortness of breath or weight gain. Risk factors include hyperlipidemia. Risk factors do not include hypertension. The symptoms have been stable. Compliance with diet is variable. Compliance with exercise is variable. Compliance with medications is good.   Hypertension   This is a chronic problem. The current episode started more than 1 year ago. The problem is controlled. Pertinent negatives include no anxiety, blurred vision, chest pain, headaches, malaise/fatigue, neck pain, orthopnea, palpitations, peripheral edema, PND, shortness of breath or sweats. Risk factors for coronary artery disease include male gender. Current antihypertension treatment includes beta blockers and angiotensin blockers. The current treatment provides significant improvement.  Hypertensive end-organ damage includes CAD/MI.   Hyperlipidemia   This is a chronic problem. The current episode started more than 1 year ago. Pertinent negatives include no chest pain or shortness of breath. Current antihyperlipidemic treatment includes statins. Risk factors for coronary artery disease include hypertension, male sex, obesity and dyslipidemia.       The following portions of the patient's history were reviewed and updated as appropriate: allergies, current medications, past family history, past medical history, past social history, past surgical history and problem list.     Allergies   Allergen Reactions   • Ramipril Angioedema     Lips gregory hives       Current Outpatient Medications:   •  apixaban (ELIQUIS) 5 MG tablet tablet, Take 1 tablet by mouth Every 12 (Twelve) Hours., Disp: 60 tablet, Rfl: 2  •  aspirin 81 MG EC tablet, Take 81 mg by mouth Daily., Disp: , Rfl:   •  atenolol (TENORMIN) 25 MG tablet, Take 25 mg by mouth Daily., Disp: , Rfl:   •  finasteride (PROSCAR) 5 MG tablet, Take 1 tablet by mouth Daily., Disp: 90 tablet, Rfl: 5  •  Krill Oil 1000 MG capsule, Take 1 capsule by mouth Daily., Disp: , Rfl:   •  losartan (COZAAR) 25 MG tablet, Take 25 mg by mouth Daily., Disp: , Rfl:   •  Multiple Vitamins-Minerals (MULTIVITAMIN ADULT PO), Take 1 tablet by mouth Daily., Disp: , Rfl:   •  NON FORMULARY, Indications: 4 life transger factor plus, Disp: , Rfl:   •  omeprazole (priLOSEC) 20 MG capsule, Take 20 mg by mouth Daily., Disp: , Rfl:   •  PATIENT SUPPLIED MEDICATION, Indications: 4 life cardio, Disp: , Rfl:   •  simvastatin (ZOCOR) 40 MG tablet, Take 40 mg by mouth Every Night., Disp: , Rfl:   •  tamsulosin (FLOMAX) 0.4 MG capsule 24 hr capsule, Take 1 capsule by mouth Daily., Disp: 90 capsule, Rfl: 5  •  coenzyme Q10 100 MG capsule, Take 100 mg by mouth Daily., Disp: , Rfl:   •  Omega-3 1000 MG capsule, Take 1 capsule by mouth Daily., Disp: , Rfl:   •  propranolol (INDERAL) 20 MG  tablet, Take 20 mg by mouth Daily., Disp: , Rfl:   Past Medical History:   Diagnosis Date   • Benign prostatic hyperplasia with lower urinary tract symptoms 12/6/2017   • Coronary artery disease    • Elevated PSA 12/6/2016   • Hyperlipidemia    • Hypertension    • New onset atrial fibrillation (CMS/HCC) 8/31/2019     Past Surgical History:   Procedure Laterality Date   • APPENDECTOMY     • CARDIAC CATHETERIZATION     • CORONARY ARTERY BYPASS GRAFT       Family History   Problem Relation Age of Onset   • Prostate cancer Father    • Heart disease Father    • Heart disease Mother    • Stroke Mother    • Heart attack Brother    • Heart disease Brother    • No Known Problems Maternal Grandmother    • No Known Problems Maternal Grandfather    • Heart disease Paternal Grandmother    • No Known Problems Paternal Grandfather    • No Known Problems Brother      Social History     Socioeconomic History   • Marital status:      Spouse name: Not on file   • Number of children: Not on file   • Years of education: Not on file   • Highest education level: Not on file   Tobacco Use   • Smoking status: Never Smoker   • Smokeless tobacco: Never Used   Substance and Sexual Activity   • Alcohol use: Yes     Alcohol/week: 0.6 oz     Types: 1 Cans of beer per week     Comment: occasionally   • Drug use: No   • Sexual activity: Defer         Review of Systems   Constitution: Negative for chills, decreased appetite, fever, weakness, malaise/fatigue, night sweats, weight gain and weight loss.   HENT: Negative for nosebleeds.    Eyes: Negative for blurred vision and visual disturbance.   Cardiovascular: Negative for chest pain, dyspnea on exertion, leg swelling, near-syncope, orthopnea, palpitations, paroxysmal nocturnal dyspnea and syncope.   Respiratory: Negative for chest tightness, cough, hemoptysis, shortness of breath, snoring and wheezing.    Endocrine: Negative for cold intolerance and heat intolerance.   Hematologic/Lymphatic:  Does not bruise/bleed easily.   Skin: Negative for rash.   Musculoskeletal: Negative for back pain, falls, muscle weakness and neck pain.   Gastrointestinal: Negative for abdominal pain, change in bowel habit, constipation, diarrhea, dysphagia, heartburn, nausea and vomiting.   Genitourinary: Negative for hematuria.   Neurological: Negative for dizziness, headaches and light-headedness.   Psychiatric/Behavioral: Negative for altered mental status.   Allergic/Immunologic: Negative for persistent infections.         ECG 12 Lead  Date/Time: 9/24/2019 10:25 AM  Performed by: Kathe Pedroza APRN  Authorized by: Kathe Pedroza APRN   Comparison: compared with previous ECG from 8/31/2019  Rhythm: sinus bradycardia  Rate: bradycardic  BPM: 50  Conduction: 1st degree AV block    Clinical impression: abnormal EKG               Objective:     Physical Exam   Constitutional: He is oriented to person, place, and time. Vital signs are normal. He appears well-developed and well-nourished. No distress.   HENT:   Head: Normocephalic and atraumatic.   Right Ear: External ear normal.   Left Ear: External ear normal.   Eyes: Conjunctivae are normal. Pupils are equal, round, and reactive to light. Right eye exhibits no discharge. Left eye exhibits no discharge.   Neck: Normal range of motion. Neck supple. No JVD present. Carotid bruit is not present. No thyromegaly present.   Cardiovascular: Regular rhythm, normal heart sounds and intact distal pulses. Bradycardia present. PMI is not displaced. Exam reveals no gallop, no friction rub and no decreased pulses.   No murmur heard.  Pulses:       Radial pulses are 2+ on the right side, and 2+ on the left side.        Dorsalis pedis pulses are 2+ on the right side, and 2+ on the left side.        Posterior tibial pulses are 2+ on the right side, and 2+ on the left side.   Pulmonary/Chest: Effort normal and breath sounds normal. No respiratory distress. He has no decreased breath sounds.  "He has no wheezes. He has no rhonchi. He has no rales. He exhibits no tenderness.   Abdominal: Soft. Bowel sounds are normal. He exhibits no distension. There is no tenderness.   Musculoskeletal: Normal range of motion. He exhibits no edema.   Neurological: He is alert and oriented to person, place, and time.   Skin: Skin is warm and dry. No rash noted. He is not diaphoretic. No erythema. No pallor.   Psychiatric: He has a normal mood and affect. His behavior is normal. Judgment and thought content normal.   Vitals reviewed.  /68   Pulse 50   Ht 175.3 cm (69\")   Wt 103 kg (226 lb)   BMI 33.37 kg/m²       Lab Review:   Lab Results   Component Value Date    WBC 10.54 08/31/2019    HGB 14.4 08/31/2019    HCT 42.0 08/31/2019    MCV 88.4 08/31/2019     08/31/2019     Lab Results   Component Value Date    GLUCOSE 104 (H) 08/31/2019    BUN 12 08/31/2019    CREATININE 0.85 08/31/2019    EGFRIFNONA 90 08/31/2019    BCR 14.1 08/31/2019    K 4.3 08/31/2019    CO2 27.0 08/31/2019    CALCIUM 9.5 08/31/2019    ALBUMIN 4.30 08/31/2019    AST 31 08/31/2019    ALT 36 08/31/2019     Lab Results   Component Value Date    CHLPL 154 (L) 08/27/2019    CHLPL 141 (L) 08/06/2018     Lab Results   Component Value Date    TRIG 96 08/27/2019    TRIG 123 08/06/2018     Lab Results   Component Value Date    HDL 65 08/27/2019    HDL 61 08/06/2018     Lab Results   Component Value Date    LDL 70 08/27/2019    LDL 55 08/06/2018           Assessment:          Diagnosis Plan   1. Coronary artery disease involving native coronary artery of native heart without angina pectoris  No clinical signs of ischemia.    2. S/P CABG x 3  2003. Continues on aspirin.    3. PAF (paroxysmal atrial fibrillation) (CMS/HCC)  No known recurrence since hospital discharge.    4. Current use of long term anticoagulation  On Eliquis. Denies bleeding.    5. Essential hypertension  Well controlled.    6. Mixed hyperlipidemia  Managed by PCP. On statin.    7. " Class 1 obesity due to excess calories with serious comorbidity and body mass index (BMI) of 33.0 to 33.9 in adult  Patient's Body mass index is 33.37 kg/m². BMI is above normal parameters. Recommendations include: educational material.            Plan:       1. Continue medications as previously prescribed.  2. Report any worsening symptoms.  3. Report any signs of bleeding.  4. Continue heart healthy diet and regular exercise as tolerated.   5. Follow up with PCP for blood pressure and cholesterol management and routine lab work.  6. Follow up with Dr. Lorenzana in three months, or sooner if needed.

## 2019-11-04 DIAGNOSIS — N13.8 BENIGN PROSTATIC HYPERPLASIA WITH URINARY OBSTRUCTION: ICD-10-CM

## 2019-11-04 DIAGNOSIS — N40.1 BENIGN PROSTATIC HYPERPLASIA WITH URINARY OBSTRUCTION: ICD-10-CM

## 2019-11-05 LAB — PSA SERPL-MCNC: 0.96 NG/ML (ref 0–4)

## 2019-11-06 NOTE — PROGRESS NOTES
Subjective    Mr. Thompson is 64 y.o. male    Chief Complaint: BPH    History of Present Illness  64-year-old male established patient follow-up for enlarged prostate on finasteride and tamsulosin.  Severity stable AUA symptom score today 14 out of 35.  Quality painless.  Location prostate.  Context he is taken tamsulosin and finasteride for a long time.  Associated symptoms denies gross hematuria, dysuria, or flank pain.  PSA this year on 11/4/2019 normal 0.95    I spent time today reviewing and summarizing old records last urology clinic visit with Dr. Sanabria 12/6/2018.       The following portions of the patient's history were reviewed and updated as appropriate: allergies, current medications, past family history, past medical history, past social history, past surgical history and problem list.    Review of Systems   Constitutional: Negative for chills and fever.   Gastrointestinal: Negative for abdominal pain, anal bleeding and blood in stool.   Genitourinary: Negative for dysuria, frequency, hematuria and urgency.   All other systems reviewed and are negative.        Current Outpatient Medications:   •  apixaban (ELIQUIS) 5 MG tablet tablet, Take 1 tablet by mouth Every 12 (Twelve) Hours., Disp: 60 tablet, Rfl: 11  •  aspirin 81 MG EC tablet, Take 81 mg by mouth Daily., Disp: , Rfl:   •  atenolol (TENORMIN) 25 MG tablet, Take 25 mg by mouth Daily., Disp: , Rfl:   •  coenzyme Q10 100 MG capsule, Take 100 mg by mouth Daily., Disp: , Rfl:   •  finasteride (PROSCAR) 5 MG tablet, Take 1 tablet by mouth Daily., Disp: 90 tablet, Rfl: 5  •  Krill Oil 1000 MG capsule, Take 1 capsule by mouth Daily., Disp: , Rfl:   •  losartan (COZAAR) 25 MG tablet, Take 25 mg by mouth Daily., Disp: , Rfl:   •  Multiple Vitamins-Minerals (MULTIVITAMIN ADULT PO), Take 1 tablet by mouth Daily., Disp: , Rfl:   •  NON FORMULARY, Indications: 4 life transger factor plus, Disp: , Rfl:   •  Omega-3 1000 MG capsule, Take 1 capsule by mouth  "Daily., Disp: , Rfl:   •  omeprazole (priLOSEC) 20 MG capsule, Take 20 mg by mouth Daily., Disp: , Rfl:   •  PATIENT SUPPLIED MEDICATION, Indications: 4 life cardio, Disp: , Rfl:   •  propranolol (INDERAL) 20 MG tablet, Take 20 mg by mouth Daily., Disp: , Rfl:   •  simvastatin (ZOCOR) 40 MG tablet, Take 40 mg by mouth Every Night., Disp: , Rfl:   •  tamsulosin (FLOMAX) 0.4 MG capsule 24 hr capsule, Take 1 capsule by mouth Daily., Disp: 90 capsule, Rfl: 5    Past Medical History:   Diagnosis Date   • Benign prostatic hyperplasia with lower urinary tract symptoms 12/6/2017   • Coronary artery disease    • Elevated PSA 12/6/2016   • Hyperlipidemia    • Hypertension    • New onset atrial fibrillation (CMS/HCC) 8/31/2019       Past Surgical History:   Procedure Laterality Date   • APPENDECTOMY     • CARDIAC CATHETERIZATION     • CORONARY ARTERY BYPASS GRAFT         Social History     Socioeconomic History   • Marital status:      Spouse name: Not on file   • Number of children: Not on file   • Years of education: Not on file   • Highest education level: Not on file   Tobacco Use   • Smoking status: Never Smoker   • Smokeless tobacco: Never Used   Substance and Sexual Activity   • Alcohol use: Yes     Alcohol/week: 0.6 oz     Types: 1 Cans of beer per week     Comment: occasionally   • Drug use: No   • Sexual activity: Defer       Family History   Problem Relation Age of Onset   • Prostate cancer Father    • Heart disease Father    • Heart disease Mother    • Stroke Mother    • Heart attack Brother    • Heart disease Brother    • No Known Problems Maternal Grandmother    • No Known Problems Maternal Grandfather    • Heart disease Paternal Grandmother    • No Known Problems Paternal Grandfather    • No Known Problems Brother        Objective    Temp 97.2 °F (36.2 °C)   Ht 175.3 cm (69\")   Wt 104 kg (230 lb)   BMI 33.97 kg/m²     Physical Exam  Constitutional: Well nourished, Well developed; No apparent distress.  " His vital signs are reviewed  Psychiatric: Appropriate affect; Alert and oriented  Eyes: Unremarkable  Musculoskeletal: Normal gait and station  GI: Abdomen is soft, non-tender  Respiratory: No distress; Unlabored movement; No accessory musculature needed with symmetric movements  Skin: No pallor or diaphoresis  ; Penis and testicles are normal; Prostate 50-60 mL without nodule      Results for orders placed or performed in visit on 11/12/19   POC Urinalysis Dipstick, Multipro   Result Value Ref Range    Color Yellow Yellow, Straw, Dark Yellow, [place]    Clarity, UA Clear Clear    Glucose, UA Negative Negative, 1000 mg/dL (3+) mg/dL    Bilirubin Negative Negative    Ketones, UA Negative Negative    Specific Gravity  1.010 1.005 - 1.030    Blood, UA Negative Negative    pH, Urine 6.5 5.0 - 8.0    Protein, POC Negative Negative mg/dL    Urobilinogen, UA Normal Normal    Nitrite, UA Negative Negative    Leukocytes Negative Negative     Patient's Body mass index is 33.97 kg/m². BMI is above normal parameters. Recommendations include: educational material.    International Prostate Symptom Score  The following is posted based on patient questionnaire answers:  0 - not at all    1-7 mild symptoms  1- Less than one time in five  8-19 moderate symptoms  2 -Less than half the time  20-35 severe symptoms  3 - About half the time  4 - More than half the time  5 - Almost always     For following sections:  Incomplete Emptying: - How often have you had the sensation  of not emptying your bladder completely after you finished urinating?  2  Frequency: -How often have you had to urinate again less than   two hours after you finished urinating?      4  Intermittency: -How often have you found you stopped and started again  Several times when you urinate?       0  Urgency: -How often do you find it difficult to postpone urination?             4  Weak stream: - How often have you had a weak urinary stream?             2  Straining:  - How often have you had to push or strain to begin  Urination?          0  Sleeping: -How many times did you most typically get up to urinate   From the time you went to bed at night until the time you got up in the   2  Morning          Total `  14    Quality of Life  How would you feel if you had to live with your urinary condition the way   3  It is now, no better, no worse for the rest of your life?    Where: 0=delighted; 1= pleased, 2= mostly satisfied, 3= mixed, 4 = mostly  Dissatisfied, 5= Unhappy, 6 = terrible    Assessment and Plan    Diagnoses and all orders for this visit:    BPH (benign prostatic hypertrophy) with urinary obstruction  -     POC Urinalysis Dipstick, Multipro    Urine frequency  -     PSA DIAGNOSTIC; Future    Essential hypertension    PAF (paroxysmal atrial fibrillation) (CMS/[facility])    Current use of long term anticoagulation      Normal PSA and exam with stable LUTS on combination therapy-continue tamsulosin and finasteride.  We discussed options for possibly adding Myrbetriq for his overactive bladder versus considering an outlet procedure.  He would like to wait for now and follow-up with me in 1 year or sooner as needed.  He may follow-up for his overactive bladder in the interim for a trial of Myrbetriq and/or for a cystoscopy in the clinic should he want to consider either UroLift or Rezum.

## 2019-11-11 ENCOUNTER — PATIENT MESSAGE (OUTPATIENT)
Dept: UROLOGY | Facility: CLINIC | Age: 64
End: 2019-11-11

## 2019-11-12 ENCOUNTER — OFFICE VISIT (OUTPATIENT)
Dept: UROLOGY | Facility: CLINIC | Age: 64
End: 2019-11-12

## 2019-11-12 VITALS — TEMPERATURE: 97.2 F | BODY MASS INDEX: 34.07 KG/M2 | HEIGHT: 69 IN | WEIGHT: 230 LBS

## 2019-11-12 DIAGNOSIS — I10 ESSENTIAL HYPERTENSION: ICD-10-CM

## 2019-11-12 DIAGNOSIS — I48.0 PAF (PAROXYSMAL ATRIAL FIBRILLATION) (HCC): ICD-10-CM

## 2019-11-12 DIAGNOSIS — N13.8 BENIGN PROSTATIC HYPERPLASIA WITH URINARY OBSTRUCTION: Primary | ICD-10-CM

## 2019-11-12 DIAGNOSIS — N40.1 BENIGN PROSTATIC HYPERPLASIA WITH URINARY OBSTRUCTION: Primary | ICD-10-CM

## 2019-11-12 DIAGNOSIS — R35.0 URINE FREQUENCY: ICD-10-CM

## 2019-11-12 DIAGNOSIS — Z79.01 CURRENT USE OF LONG TERM ANTICOAGULATION: ICD-10-CM

## 2019-11-12 PROCEDURE — 99214 OFFICE O/P EST MOD 30 MIN: CPT | Performed by: UROLOGY

## 2019-11-12 PROCEDURE — 81001 URINALYSIS AUTO W/SCOPE: CPT | Performed by: UROLOGY

## 2019-11-12 NOTE — PATIENT INSTRUCTIONS

## 2019-12-23 DIAGNOSIS — N13.8 BENIGN PROSTATIC HYPERPLASIA WITH URINARY OBSTRUCTION: ICD-10-CM

## 2019-12-23 DIAGNOSIS — N40.1 BENIGN PROSTATIC HYPERPLASIA WITH URINARY OBSTRUCTION: ICD-10-CM

## 2019-12-26 RX ORDER — TAMSULOSIN HYDROCHLORIDE 0.4 MG/1
CAPSULE ORAL
Qty: 90 CAPSULE | Refills: 3 | Status: SHIPPED | OUTPATIENT
Start: 2019-12-26 | End: 2020-11-12 | Stop reason: SDUPTHER

## 2020-01-02 ENCOUNTER — OFFICE VISIT (OUTPATIENT)
Dept: CARDIOLOGY | Facility: CLINIC | Age: 65
End: 2020-01-02

## 2020-01-02 VITALS
BODY MASS INDEX: 34.96 KG/M2 | SYSTOLIC BLOOD PRESSURE: 125 MMHG | WEIGHT: 236 LBS | DIASTOLIC BLOOD PRESSURE: 75 MMHG | OXYGEN SATURATION: 98 % | HEART RATE: 58 BPM | HEIGHT: 69 IN

## 2020-01-02 DIAGNOSIS — I25.10 CORONARY ARTERY DISEASE INVOLVING NATIVE CORONARY ARTERY OF NATIVE HEART WITHOUT ANGINA PECTORIS: Primary | ICD-10-CM

## 2020-01-02 DIAGNOSIS — E78.2 MIXED HYPERLIPIDEMIA: ICD-10-CM

## 2020-01-02 DIAGNOSIS — I10 ESSENTIAL HYPERTENSION: ICD-10-CM

## 2020-01-02 DIAGNOSIS — Z79.01 CURRENT USE OF LONG TERM ANTICOAGULATION: ICD-10-CM

## 2020-01-02 DIAGNOSIS — I48.0 PAF (PAROXYSMAL ATRIAL FIBRILLATION) (HCC): ICD-10-CM

## 2020-01-02 DIAGNOSIS — E66.09 CLASS 1 OBESITY DUE TO EXCESS CALORIES WITH SERIOUS COMORBIDITY AND BODY MASS INDEX (BMI) OF 33.0 TO 33.9 IN ADULT: ICD-10-CM

## 2020-01-02 DIAGNOSIS — Z95.1 S/P CABG X 3: ICD-10-CM

## 2020-01-02 PROCEDURE — 99214 OFFICE O/P EST MOD 30 MIN: CPT | Performed by: NURSE PRACTITIONER

## 2020-01-02 NOTE — PROGRESS NOTES
Subjective:     Encounter Date:01/02/2020      Patient ID: Pawel Thompson is a 64 y.o. male with a previous history of coronary artery disease/CABG, hypertension, hyperlipidemia, paroxysmal atrial fibrillation, long-term use of anticoagulation, and obesity.  Who presents today for routine follow-up.    Chief Complaint: Follow-up  Coronary Artery Disease   Presents for follow-up visit. Pertinent negatives include no chest pain, chest pressure, chest tightness, dizziness, leg swelling, palpitations or shortness of breath. Risk factors do not include hypertension. The symptoms have been stable. Compliance with diet is good. Compliance with exercise is good. Compliance with medications is good.   Atrial Fibrillation   Presents for follow-up visit. Symptoms include bradycardia. Symptoms are negative for chest pain, dizziness, hemodynamic instability, hypertension, hypotension, palpitations, shortness of breath, syncope, tachycardia and weakness. The symptoms have been stable. Past medical history includes atrial fibrillation and CAD. There are no medication compliance problems.   Hypertension   This is a chronic problem. The current episode started more than 1 year ago. The problem is controlled. Pertinent negatives include no chest pain, malaise/fatigue, orthopnea, palpitations, peripheral edema, PND or shortness of breath. Risk factors for coronary artery disease include male gender, obesity and dyslipidemia. Past treatments include ACE inhibitors. Current antihypertension treatment includes beta blockers and angiotensin blockers.     Mr. Thompson presents for follow up. He is compliant with his medications. He has had no complaints of chest pain, chest tightness, or discomfort. He denies palpitations, shortness of breath, dizziness, dyspnea on exertion, of near syncope. He is anticoagulated. He is not having any issues with bleeding.    The following portions of the patient's history were reviewed and updated as  appropriate: allergies, current medications, past family history, past medical history, past social history and past surgical history.     Allergies   Allergen Reactions   • Ramipril Angioedema     Lips swell, hives       Current Outpatient Medications:   •  apixaban (ELIQUIS) 5 MG tablet tablet, Take 1 tablet by mouth Every 12 (Twelve) Hours., Disp: 60 tablet, Rfl: 11  •  aspirin 81 MG EC tablet, Take 81 mg by mouth Daily., Disp: , Rfl:   •  atenolol (TENORMIN) 25 MG tablet, Take 25 mg by mouth Daily., Disp: , Rfl:   •  finasteride (PROSCAR) 5 MG tablet, Take 1 tablet by mouth Daily., Disp: 90 tablet, Rfl: 5  •  losartan (COZAAR) 25 MG tablet, Take 25 mg by mouth Daily., Disp: , Rfl:   •  NON FORMULARY, Indications: 4 life transger factor plus, Disp: , Rfl:   •  Omega-3 1000 MG capsule, Take 1 capsule by mouth Daily., Disp: , Rfl:   •  omeprazole (priLOSEC) 20 MG capsule, Take 20 mg by mouth Daily., Disp: , Rfl:   •  PATIENT SUPPLIED MEDICATION, Indications: 4 life cardio, Disp: , Rfl:   •  propranolol (INDERAL) 20 MG tablet, Take 20 mg by mouth Daily., Disp: , Rfl:   •  simvastatin (ZOCOR) 40 MG tablet, Take 40 mg by mouth Every Night., Disp: , Rfl:   •  tamsulosin (FLOMAX) 0.4 MG capsule 24 hr capsule, TAKE 1 CAPSULE BY MOUTH ONCE DAILY, Disp: 90 capsule, Rfl: 3    Past Medical History:   Diagnosis Date   • Benign prostatic hyperplasia with lower urinary tract symptoms 12/6/2017   • Coronary artery disease    • Elevated PSA 12/6/2016   • Hyperlipidemia    • Hypertension    • New onset atrial fibrillation (CMS/HCC) 8/31/2019     Family History   Problem Relation Age of Onset   • Prostate cancer Father    • Heart disease Father    • Heart disease Mother    • Stroke Mother    • Heart attack Brother    • Heart disease Brother    • No Known Problems Maternal Grandmother    • No Known Problems Maternal Grandfather    • Heart disease Paternal Grandmother    • No Known Problems Paternal Grandfather    • No Known Problems  "Brother      Social History     Socioeconomic History   • Marital status:      Spouse name: Not on file   • Number of children: Not on file   • Years of education: Not on file   • Highest education level: Not on file   Tobacco Use   • Smoking status: Never Smoker   • Smokeless tobacco: Never Used   Substance and Sexual Activity   • Alcohol use: Yes     Alcohol/week: 1.0 standard drinks     Types: 1 Cans of beer per week     Comment: occasionally   • Drug use: No   • Sexual activity: Defer     Past Surgical History:   Procedure Laterality Date   • APPENDECTOMY     • CARDIAC CATHETERIZATION     • CORONARY ARTERY BYPASS GRAFT       Review of Systems   Constitution: Negative for diaphoresis, fever and malaise/fatigue.   Eyes: Negative for visual disturbance.   Cardiovascular: Negative for chest pain, irregular heartbeat, leg swelling, orthopnea, palpitations, paroxysmal nocturnal dyspnea and syncope.   Respiratory: Negative for chest tightness, cough, shortness of breath and wheezing.    Hematologic/Lymphatic: Negative for bleeding problem. Bruises/bleeds easily.   Gastrointestinal: Negative for bloating, abdominal pain, nausea and vomiting.   Neurological: Negative for dizziness and weakness.   Psychiatric/Behavioral: Negative for altered mental status.         ECG 12 Lead  Date/Time: 1/3/2020 1:29 PM  Performed by: Janelle Kim APRN  Authorized by: Janelle Kim APRN   Comparison: compared with previous ECG from 9/24/2019  Similar to previous ECG  Rhythm: sinus bradycardia  Rate: bradycardic  BPM: 58  Conduction: 1st degree AV block  ST Segments: ST segments normal  T Waves: T waves normal  QRS axis: normal    Clinical impression: abnormal EKG          /75   Pulse 58   Ht 175.3 cm (69\")   Wt 107 kg (236 lb)   SpO2 98%   BMI 34.85 kg/m²        Objective:     Physical Exam   Constitutional: He is oriented to person, place, and time. He appears well-developed and well-nourished. No distress. "   HENT:   Head: Normocephalic and atraumatic.   Nose: Nose normal.   Mouth/Throat: Oropharynx is clear and moist.   Eyes: Conjunctivae are normal. No scleral icterus.   Neck: Normal range of motion. Neck supple.   Cardiovascular: Regular rhythm and normal heart sounds. Bradycardia present.   Pulmonary/Chest: Effort normal and breath sounds normal. No respiratory distress. He has no wheezes. He has no rales.   Abdominal: Soft. Normal appearance. He exhibits no distension. There is no tenderness.   Musculoskeletal: Normal range of motion. He exhibits no edema.   Neurological: He is alert and oriented to person, place, and time.   Skin: Skin is warm, dry and intact. No rash noted. He is not diaphoretic. No erythema. No pallor.   Psychiatric: He has a normal mood and affect. His behavior is normal.   Vitals reviewed.      Lab Review:       Assessment:          Diagnosis Plan   1. Coronary artery disease involving native coronary artery of native heart without angina pectoris     2. PAF (paroxysmal atrial fibrillation) (CMS/[facility])     3. Essential hypertension     4. Mixed hyperlipidemia     5. Current use of long term anticoagulation     6. S/P CABG x 3     7. Class 1 obesity due to excess calories with serious comorbidity and body mass index (BMI) of 33.0 to 33.9 in adult            Plan:       - CAD: Stable. No clinical indication of ischemia. On baby aspirin. Denies any chest pain, pressure, or similar. No shortness of breath. Last cath 4/2013.    - PAF: SB on EKG today. Anticoagulated with Eliquis. New onset AF noted on hospitalization in 9/2019.  Converted back to SR during hospitalization.     - HTN: well controlled.     - HLD: LDL 70 August of 2019    - Anticoagulation: Eliquis 5 mg BID    - CABG:  3 vessel CABG 2003    - Obesity: diet and exercise encouraged.     Follow up in 6 months, sooner if needed.

## 2020-01-02 NOTE — PATIENT INSTRUCTIONS
Bleeding Precautions When on Anticoagulant Therapy, Adult  Anticoagulant therapy, also called blood thinner therapy, is medicine that helps to prevent and treat blood clots. The medicine works by stopping blood clots from forming or growing. Blood clots that form in your blood vessels can be dangerous. They can break loose and travel to the heart, lungs, or brain. This increases the risk of a heart attack, stroke, or blocked lung artery (pulmonary embolism).  Anticoagulants also increase the risk of bleeding. Try to protect yourself from cuts and other injuries that can cause bleeding. It is important to take anticoagulants exactly as told by your health care provider.  Why do I need to be on anticoagulant therapy?  You may need this medicine if you are at risk of developing a blood clot. Conditions that increase your risk of a blood clot include:  · Being born with heart disease or a heart malformation (congenital heart disease).  · Developing heart disease.  · Having had surgery, such as valve replacement.  · Having had a serious accident or other type of severe injury (trauma).  · Having certain types of cancer.  · Having certain diseases that can increase blood clotting.  · Having a high risk of stroke or heart attack.  · Having atrial fibrillation (AF).  What are the common anticoagulant medicines?  There are several types of anticoagulant medicines. The most common types are:  · Medicines that you take by mouth (oral medicines), such as:  ? Warfarin.  ? Novel oral anticoagulants (NOACs), such as:  ? Direct thrombin inhibitors (dabigatran).  ? Factor Xa inhibitors (apixaban, edoxaban, and rivaroxaban).  · Injections, such as:  ? Unfractionated heparin.  ? Low molecular weight heparin.  These anticoagulants work in different ways to prevent blood clots. They also have different risks and side effects.  What do I need to remember while on anticoagulant therapy?  Taking anticoagulants  · Take your medicine at the  same time every day. If you forget to take your medicine, take it as soon as you remember. Do not double your dosage of medicine if you miss a whole day. Take your normal dose and call your health care provider.  · Do not stop taking your medicine unless your health care provider approves. Stopping the medicine can increase your risk of developing a blood clot.  Taking other medicines  · Take over-the-counter and prescriptions medicines only as told by your health care provider.  · Do not take over-the-counter NSAIDs, including aspirin and ibuprofen, while you are on anticoagulant therapy. These medicines increase your risk of dangerous bleeding.  · Get approval from your health care provider before you start taking any new medicines, vitamins, or herbal products. Some of these could interfere with your therapy.  General instructions  · Keep all follow-up visits as told by your health care provider. This is important.  · If you are pregnant or trying to get pregnant, talk with a health care provider about anticoagulants. Some of these medicines are not safe to take during pregnancy.  · Tell all health care providers, including your dentist, that you are on anticoagulant therapy. It is especially important to tell providers before you have any surgery, medical procedures, or dental work done.  What precautions should I take?    · Be very careful when using knives, scissors, or other sharp objects.  · Use an electric razor instead of a blade.  · Do not use toothpicks.  · Use a soft-bristled toothbrush. Brush your teeth gently.  · Always wear shoes outdoors and wear slippers indoors.  · Be careful when cutting your fingernails and toenails.  · Place bath mats in the bathroom. If possible, install handrails as well.  · Wear gloves while you do yard work.  · Wear your seat belt.  · Prevent falls by removing loose rugs and extension cords from areas where you walk. Use a cane or walker if you need it.  · Avoid  constipation by:  ? Drinking enough fluid to keep your urine clear or pale yellow.  ? Eating foods that are high in fiber, such as fresh fruits and vegetables, whole grains, and beans.  ? Limiting foods that are high in fat and processed sugars, such as fried and sweet foods.  · Do not play contact sports or participate in other activities that have a high risk for injury.  What other precautions are important if on warfarin therapy?  If you are taking a type of anticoagulant called warfarin, make sure you:  · Work with a diet and nutrition specialist (dietitian) to make an eating plan. Do not make any sudden changes to your diet after you have started your eating plan.  · Do not drink alcohol. It can interfere with your medicine and increase your risk of an injury that causes bleeding.  · Get regular blood tests as told by your health care provider.  What are some questions to ask my health care provider?  · Why do I need anticoagulant therapy?  · What is the best anticoagulant therapy for my condition?  · How long will I need anticoagulant therapy?  · What are the side effects of anticoagulant therapy?  · When should I take my medicine? What should I do if I forget to take it?  · Will I need to have regular blood tests?  · Do I need to change my diet? Are there foods or drinks that I should avoid?  · What activities are safe for me?  · What should I do if I want to get pregnant?  Contact a health care provider if:  · You miss a dose of medicine:  ? And you are not sure what to do.  ? For more than one day.  · You have:  ? Menstrual bleeding that is heavier than normal.  ? Bloody or brown urine.  ? Easy bruising.  ? Black and tarry stool or bright red stool.  ? Side effects from your medicine.  · You feel weak or dizzy.  · You become pregnant.  Get help right away if:  · You have bleeding that will not stop within 20 minutes from:  ? The nose.  ? The gums.  ? A cut on the skin.  · You have a severe headache or  stomachache.  · You vomit or cough up blood.  · You fall or hit your head.  Summary  · Anticoagulant therapy, also called blood thinner therapy, is medicine that helps to prevent and treat blood clots.  · Anticoagulants work in different ways to prevent blood clots. They also have different risks and side effects.  · Talk with your health care provider about any precautions that you should take while on anticoagulant therapy.  This information is not intended to replace advice given to you by your health care provider. Make sure you discuss any questions you have with your health care provider.  Document Released: 11/28/2016 Document Revised: 03/06/2018 Document Reviewed: 03/06/2018  [employer] Interactive Patient Education © 2019 [employer]  Hypertension  Hypertension is another name for high blood pressure. High blood pressure forces your heart to work harder to pump blood. This can cause problems over time.  There are two numbers in a blood pressure reading. There is a top number (systolic) over a bottom number (diastolic). It is best to have a blood pressure below 120/80. Healthy choices can help lower your blood pressure. You may need medicine to help lower your blood pressure if:  · Your blood pressure cannot be lowered with healthy choices.  · Your blood pressure is higher than 130/80.  Follow these instructions at home:  Eating and drinking    · If directed, follow the DASH eating plan. This diet includes:  ? Filling half of your plate at each meal with fruits and vegetables.  ? Filling one quarter of your plate at each meal with whole grains. Whole grains include whole wheat pasta, brown rice, and whole grain bread.  ? Eating or drinking low-fat dairy products, such as skim milk or low-fat yogurt.  ? Filling one quarter of your plate at each meal with low-fat (lean) proteins. Low-fat proteins include fish, skinless chicken, eggs, beans, and tofu.  ? Avoiding fatty meat, cured and processed meat, or chicken  with skin.  ? Avoiding premade or processed food.  · Eat less than 1,500 mg of salt (sodium) a day.  · Limit alcohol use to no more than 1 drink a day for nonpregnant women and 2 drinks a day for men. One drink equals 12 oz of beer, 5 oz of wine, or 1½ oz of hard liquor.  Lifestyle  · Work with your doctor to stay at a healthy weight or to lose weight. Ask your doctor what the best weight is for you.  · Get at least 30 minutes of exercise that causes your heart to beat faster (aerobic exercise) most days of the week. This may include walking, swimming, or biking.  · Get at least 30 minutes of exercise that strengthens your muscles (resistance exercise) at least 3 days a week. This may include lifting weights or pilates.  · Do not use any products that contain nicotine or tobacco. This includes cigarettes and e-cigarettes. If you need help quitting, ask your doctor.  · Check your blood pressure at home as told by your doctor.  · Keep all follow-up visits as told by your doctor. This is important.  Medicines  · Take over-the-counter and prescription medicines only as told by your doctor. Follow directions carefully.  · Do not skip doses of blood pressure medicine. The medicine does not work as well if you skip doses. Skipping doses also puts you at risk for problems.  · Ask your doctor about side effects or reactions to medicines that you should watch for.  Contact a doctor if:  · You think you are having a reaction to the medicine you are taking.  · You have headaches that keep coming back (recurring).  · You feel dizzy.  · You have swelling in your ankles.  · You have trouble with your vision.  Get help right away if:  · You get a very bad headache.  · You start to feel confused.  · You feel weak or numb.  · You feel faint.  · You get very bad pain in your:  ? Chest.  ? Belly (abdomen).  · You throw up (vomit) more than once.  · You have trouble breathing.  Summary  · Hypertension is another name for high blood  pressure.  · Making healthy choices can help lower blood pressure. If your blood pressure cannot be controlled with healthy choices, you may need to take medicine.  This information is not intended to replace advice given to you by your health care provider. Make sure you discuss any questions you have with your health care provider.  Document Released: 06/05/2009 Document Revised: 11/15/2017 Document Reviewed: 11/15/2017  [employer] Interactive Patient Education © 2019 [employer]  Atrial Fibrillation  Atrial fibrillation is a type of irregular or rapid heartbeat (arrhythmia). In atrial fibrillation, the top part of the heart (atria) quivers in a chaotic pattern. This makes the heart unable to pump blood normally. Having atrial fibrillation can increase your risk for other health problems, such as:  · Blood can pool in the atria and form clots. If a clot travels to the brain, it can cause a stroke.  · The heart muscle may weaken from the irregular blood flow. This can cause heart failure.  Atrial fibrillation may start suddenly and stop on its own, or it may become a long-lasting problem.  What are the causes?  This condition is caused by some heart-related conditions or procedures, including:  · High blood pressure. This is the most common cause.  · Heart failure.  · Heart valve conditions.  · Inflammation of the sac that surrounds the heart (pericarditis).  · Heart surgery.  · Coronary artery disease.  · Certain heart rhythm disorders, such as Malik-Parkinson-White syndrome.  Other causes include:  · Pneumonia.  · Obstructive sleep apnea.  · Lung cancer.  · Thyroid problems, especially if the thyroid is overactive (hyperthyroidism).  · Excessive alcohol or drug use.  Sometimes, the cause of this condition is not known.  What increases the risk?  This condition is more likely to develop in:  · Older people.  · People who smoke.  · People who have diabetes mellitus.  · People who are overweight (obese).  · Athletes  who exercise vigorously.  · People who have a family history.  What are the signs or symptoms?  Symptoms of this condition include:  · A feeling that your heart is beating rapidly or irregularly.  · A feeling of discomfort or pain in your chest.  · Shortness of breath.  · Sudden light-headedness or weakness.  · Getting tired easily during exercise.  In some cases, there are no symptoms.  How is this diagnosed?  Your health care provider may be able to detect atrial fibrillation when taking your pulse. If detected, this condition may be diagnosed with:  · Electrocardiogram (ECG).  · Ambulatory cardiac monitor. This device records your heartbeats for 24 hours or more.  · Transthoracic echocardiogram (TTE) to evaluate how blood flows through your heart.  · Transesophageal echocardiogram (JORGE) to view more detailed images of your heart.  · A stress test.  · Imaging tests, such as a CT scan or chest X-ray.  · Blood tests.  How is this treated?  This condition may be treated with:  · Medicines to slow down the heart rate or bring the heart's rhythm back to normal.  · Medicines to prevent blood clots from forming.  · Electrical cardioversion. This delivers a low-energy shock to the heart to reset its rhythm.  · Ablation. This procedure destroys the part of the heart tissue that sends abnormal signals.  · Left atrial appendage occlusion/excision. This seals off a common place in the atria where blood clots can form (left atrial appendage).  The goal of treatment is to prevent blood clots from forming and to keep your heart beating at a normal rate and rhythm. Treatment depends on underlying medical conditions and how you feel when you are experiencing fibrillation.  Follow these instructions at home:  Medicines  · Take over-the counter and prescription medicines only as told by your health care provider.  · If your health care provider prescribed a blood-thinning medicine (anticoagulant), take it exactly as told. Taking too  "much blood-thinning medicine can cause bleeding. Taking too little can enable a blood clot to form and travel to the brain, causing a stroke.  Lifestyle         · Do not use any products that contain nicotine or tobacco, such as cigarettes and e-cigarettes. If you need help quitting, ask your health care provider.  · Do not drink beverages that contain caffeine, such as coffee, soda, and tea.  · Follow diet instructions as told by your health care provider.  · Exercise regularly as told by your health care provider.  · Do not drink alcohol.  General instructions  · If you have obstructive sleep apnea, manage your condition as told by your health care provider.  · Maintain a healthy weight. Do not use diet pills unless your health care provider approves. Diet pills may make heart problems worse.  · Keep all follow-up visits as told by your health care provider. This is important.  Contact a health care provider if you:  · Notice a change in the rate, rhythm, or strength of your heartbeat.  · Are taking an anticoagulant and you notice increased bruising.  · Tire more easily when you exercise or exert yourself.  · Have a sudden change in weight.  Get help right away if you have:    · Chest pain, abdominal pain, sweating, or weakness.  · Difficulty breathing.  · Blood in your vomit, stool (feces), or urine.  · Any symptoms of a stroke. \"BE FAST\" is an easy way to remember the main warning signs of a stroke:  ? B - Balance. Signs are dizziness, sudden trouble walking, or loss of balance.  ? E - Eyes. Signs are trouble seeing or a sudden change in vision.  ? F - Face. Signs are sudden weakness or numbness of the face, or the face or eyelid drooping on one side.  ? A - Arms. Signs are weakness or numbness in an arm. This happens suddenly and usually on one side of the body.  ? S - Speech. Signs are sudden trouble speaking, slurred speech, or trouble understanding what people say.  ? T - Time. Time to call emergency " services. Write down what time symptoms started.  · Other signs of a stroke, such as:  ? A sudden, severe headache with no known cause.  ? Nausea or vomiting.  ? Seizure.  These symptoms may represent a serious problem that is an emergency. Do not wait to see if the symptoms will go away. Get medical help right away. Call your local emergency services (911 in the [place] Do not drive yourself to the hospital.  Summary  · Atrial fibrillation is a type of irregular or rapid heartbeat (arrhythmia).  · Symptoms include a feeling that your heart is beating fast or irregularly. In some cases, you may not have symptoms.  · The condition is treated with medicines to slow down the heart rate or bring the heart's rhythm back to normal. You may also need blood-thinning medicines to prevent blood clots.  · Get help right away if you have symptoms or signs of a stroke.  This information is not intended to replace advice given to you by your health care provider. Make sure you discuss any questions you have with your health care provider.  Document Released: 12/18/2006 Document Revised: 02/08/2019 Document Reviewed: 02/08/2019  [employer] Interactive Patient Education © 2019 [employer]  Coronary Artery Disease, Male    Coronary artery disease (CAD) is a condition in which the arteries that lead to the heart (coronary arteries) become narrow or blocked. The narrowing or blockage can lead to decreased blood flow to the heart. Prolonged reduced blood flow can cause a heart attack (myocardial infarction or MI). This condition may also be called coronary heart disease.  Because CAD is the leading cause of death in men, it is important to understand what causes this condition and how it is treated.  What are the causes?  CAD is most often caused by atherosclerosis. This is the buildup of fat and cholesterol (plaque) on the inside of the arteries. Over time, the plaque may narrow or block the artery, reducing blood flow to the heart.  Plaque can also become weak and break off within a coronary artery and cause a sudden blockage. Other less common causes of CAD include:  · An embolism or blood clot in a coronary artery.  · A tearing of the artery (spontaneous coronary artery dissection).  · An aneurysm.  · Inflammation (vasculitis) in the artery wall.  What increases the risk?  The following factors may make you more likely to develop this condition:  · Age. Men over age 45 are at a greater risk of CAD.  · Family history of CAD.  · Gender. Men often develop CAD earlier in life than women.  · High blood pressure (hypertension).  · Diabetes.  · High cholesterol levels.  · Tobacco use.  · Excessive alcohol use.  · Lack of exercise.  · A diet high in saturated and trans fats, such as fried food and processed meat.  Other possible risk factors include:  · High stress levels.  · Depression.  · Obesity.  · Sleep apnea.  What are the signs or symptoms?  Many people do not have any symptoms during the early stages of CAD. As the condition progresses, symptoms may include:  · Chest pain (angina). The pain can:  ? Feel like a crushing or squeezing, or a tightness, pressure, fullness, or heaviness in the chest.  ? Last more than a few minutes or can stop and recur. The pain tends to get worse with exercise or stress and to fade with rest.  · Pain in the arms, neck, jaw, or back.  · Unexplained heartburn or indigestion.  · Shortness of breath.  · Nausea or vomiting.  · Sudden light-headedness.  · Sudden cold sweats.  · Fluttering or fast heartbeat (palpitations).  How is this diagnosed?  This condition is diagnosed based on:  · Your family and medical history.  · A physical exam.  · Tests, including:  ? A test to check the electrical signals in your heart (electrocardiogram).  ? Exercise stress test. This looks for signs of blockage when the heart is stressed with exercise, such as running on a treadmill.  ? Pharmacologic stress test. This test looks for signs  of blockage when the heart is being stressed with a medicine.  ? Blood tests.  ? Coronary angiogram. This is a procedure to look at the coronary arteries to see if there is any blockage. During this test, a dye is injected into your arteries so they appear on an X-ray.  ? A test that uses sound waves to take a picture of your heart (echocardiogram).  ? Chest X-ray.  How is this treated?  This condition may be treated by:  · Healthy lifestyle changes to reduce risk factors.  · Medicines such as:  ? Antiplatelet medicines and blood-thinning medicines, such as aspirin. These help to prevent blood clots.  ? Nitroglycerin.  ? Blood pressure medicines.  ? Cholesterol-lowering medicine.  · Coronary angioplasty and stenting. During this procedure, a thin, flexible tube is inserted through a blood vessel and into a blocked artery. A balloon or similar device on the end of the tube is inflated to open up the artery. In some cases, a small, mesh tube (stent) is inserted into the artery to keep it open.  · Coronary artery bypass surgery. During this surgery, veins or arteries from other parts of the body are used to create a bypass around the blockage and allow blood to reach your heart.  Follow these instructions at home:  Medicines  · Take over-the-counter and prescription medicines only as told by your health care provider.  · Do not take the following medicines unless your health care provider approves:  ? NSAIDs, such as ibuprofen, naproxen, or celecoxib.  ? Vitamin supplements that contain vitamin A, vitamin E, or both.  Lifestyle  · Follow an exercise program approved by your health care provider. Aim for 150 minutes of moderate exercise or 75 minutes of vigorous exercise each week.  · Maintain a healthy weight or lose weight as approved by your health care provider.  · Rest when you are tired.  · Learn to manage stress or try to limit your stress. Ask your health care provider for suggestions if you need help.  · Get  screened for depression and seek treatment, if needed.  · Do not use any products that contain nicotine or tobacco, such as cigarettes and e-cigarettes. If you need help quitting, ask your health care provider.  · Do not use illegal drugs.  Eating and drinking  · Follow a heart-healthy diet. A dietitian can help educate you about healthy food options and changes. In general, eat plenty of fruits and vegetables, lean meats, and whole grains.  · Avoid foods high in:  ? Sugar.  ? Salt (sodium).  ? Saturated fat, such as processed or fatty meat.  ? Trans fat, such as fried foods.  · Use healthy cooking methods such as roasting, grilling, broiling, baking, poaching, steaming, or stir-frying.  · If you drink alcohol, and your health care provider approves, limit your alcohol intake to no more than 2 drinks per day. One drink equals 12 ounces of beer, 5 ounces of wine, or 1½ ounces of hard liquor.  General instructions  · Manage any other health conditions, such as hypertension and diabetes. These conditions affect your heart.  · Your health care provider may ask you to monitor your blood pressure. Ideally, your blood pressure should be below 130/80.  · Keep all follow-up visits as told by your health care provider. This is important.  Get help right away if:  · You have pain in your chest, neck, arm, jaw, stomach, or back that:  ? Lasts more than a few minutes.  ? Is recurring.  ? Is not relieved by taking medicine under your tongue (sublingualnitroglycerin).  · You have too much (profuse) sweating without cause.  · You have unexplained:  ? Heartburn or indigestion.  ? Shortness of breath or difficulty breathing.  ? Fluttering or fast heartbeat (palpitations).  ? Nausea or vomiting.  ? Fatigue.  ? Feelings of nervousness or anxiety.  ? Weakness.  ? Diarrhea.  · You have sudden light-headedness or dizziness.  · You faint.  · You feel like hurting yourself or think about taking your own life.  These symptoms may represent a  "serious problem that is an emergency. Do not wait to see if the symptoms will go away. Get medical help right away. Call your local emergency services (911 in the [place] Do not drive yourself to the hospital.  Summary  · Coronary artery disease (CAD) is a process in which the arteries that lead to the heart (coronary arteries) become narrow or blocked. The narrowing or blockage can lead to a heart attack.  · Many people do not have any symptoms during the early stages of CAD. This is called \"silent CAD.\"  · CAD can be treated with lifestyle changes, medicines, surgery, or a combination of these treatments.  This information is not intended to replace advice given to you by your health care provider. Make sure you discuss any questions you have with your health care provider.  Document Released: 07/15/2015 Document Revised: 12/08/2017 Document Reviewed: 12/08/2017  [employer] Patient Education © 2019 [employer]    "

## 2020-01-03 PROCEDURE — 93000 ELECTROCARDIOGRAM COMPLETE: CPT | Performed by: NURSE PRACTITIONER

## 2020-01-28 ENCOUNTER — OFFICE VISIT (OUTPATIENT)
Dept: INTERNAL MEDICINE | Age: 65
End: 2020-01-28
Payer: MEDICARE

## 2020-01-28 VITALS
WEIGHT: 223 LBS | OXYGEN SATURATION: 96 % | HEART RATE: 64 BPM | HEIGHT: 70 IN | BODY MASS INDEX: 31.92 KG/M2 | SYSTOLIC BLOOD PRESSURE: 110 MMHG | DIASTOLIC BLOOD PRESSURE: 58 MMHG

## 2020-01-28 PROBLEM — G47.33 OSA (OBSTRUCTIVE SLEEP APNEA): Status: ACTIVE | Noted: 2020-01-28

## 2020-01-28 PROCEDURE — G8417 CALC BMI ABV UP PARAM F/U: HCPCS | Performed by: INTERNAL MEDICINE

## 2020-01-28 PROCEDURE — 4040F PNEUMOC VAC/ADMIN/RCVD: CPT | Performed by: INTERNAL MEDICINE

## 2020-01-28 PROCEDURE — 1123F ACP DISCUSS/DSCN MKR DOCD: CPT | Performed by: INTERNAL MEDICINE

## 2020-01-28 PROCEDURE — 1036F TOBACCO NON-USER: CPT | Performed by: INTERNAL MEDICINE

## 2020-01-28 PROCEDURE — 99213 OFFICE O/P EST LOW 20 MIN: CPT | Performed by: INTERNAL MEDICINE

## 2020-01-28 PROCEDURE — 3017F COLORECTAL CA SCREEN DOC REV: CPT | Performed by: INTERNAL MEDICINE

## 2020-01-28 PROCEDURE — G8484 FLU IMMUNIZE NO ADMIN: HCPCS | Performed by: INTERNAL MEDICINE

## 2020-01-28 PROCEDURE — G8427 DOCREV CUR MEDS BY ELIG CLIN: HCPCS | Performed by: INTERNAL MEDICINE

## 2020-01-28 ASSESSMENT — ENCOUNTER SYMPTOMS
BACK PAIN: 0
EYE DISCHARGE: 0
ABDOMINAL DISTENTION: 0
EYE REDNESS: 0
SHORTNESS OF BREATH: 0
ABDOMINAL PAIN: 0
SINUS PRESSURE: 0
COUGH: 0

## 2020-01-28 ASSESSMENT — PATIENT HEALTH QUESTIONNAIRE - PHQ9
1. LITTLE INTEREST OR PLEASURE IN DOING THINGS: 0
SUM OF ALL RESPONSES TO PHQ QUESTIONS 1-9: 0
SUM OF ALL RESPONSES TO PHQ9 QUESTIONS 1 & 2: 0
SUM OF ALL RESPONSES TO PHQ QUESTIONS 1-9: 0
2. FEELING DOWN, DEPRESSED OR HOPELESS: 0

## 2020-01-28 NOTE — PROGRESS NOTES
status: Not on file    Intimate partner violence:     Fear of current or ex partner: Not on file     Emotionally abused: Not on file     Physically abused: Not on file     Forced sexual activity: Not on file   Other Topics Concern    Not on file   Social History Narrative    Not on file       Allergies   Allergen Reactions    Ramipril      Lips swell, hives       Current Outpatient Medications   Medication Sig Dispense Refill    apixaban (ELIQUIS) 5 MG TABS tablet Take 5 mg by mouth 2 times daily      UNABLE TO FIND Indications: 4 life transger factor plus       NONFORMULARY Indications: 4 life cardio       atenolol (TENORMIN) 25 MG tablet TAKE 1 TABLET BY MOUTH ONCE DAILY 90 tablet 3    losartan (COZAAR) 25 MG tablet TAKE (1) TABLET BY MOUTH ONCE DAILY AS DIRECTED. 90 tablet 3    simvastatin (ZOCOR) 40 MG tablet TAKE 1 TABLET AT BEDTIME 90 tablet 3    tamsulosin (FLOMAX) 0.4 MG capsule TAKE 1 CAPSULE BY MOUTH ONCE DAILY 90 capsule 3    aspirin EC 81 MG EC tablet Take 81 mg by mouth daily      coenzyme Q10 100 MG CAPS capsule Take 100 mg by mouth daily      Krill Oil 1000 MG CAPS Take 1 capsule by mouth daily      omeprazole (PRILOSEC) 20 MG delayed release capsule Take 20 mg by mouth daily      finasteride (PROSCAR) 5 MG tablet TAKE 1 TABLET BY MOUTH ONCE DAILY 90 tablet 3     No current facility-administered medications for this visit. Review of Systems   Constitutional: Negative for chills, fatigue and fever. HENT: Negative for congestion and sinus pressure. Eyes: Negative for discharge and redness. Respiratory: Negative for cough and shortness of breath. Cardiovascular: Negative for chest pain, palpitations and leg swelling. Gastrointestinal: Negative for abdominal distention and abdominal pain. Genitourinary: Negative for dysuria, frequency and urgency. Musculoskeletal: Negative for arthralgias and back pain. Skin: Negative for rash and wound.    Neurological: Negative for dizziness, light-headedness and headaches. Psychiatric/Behavioral: Negative for dysphoric mood and sleep disturbance. The patient is not nervous/anxious. BP (!) 110/58 (Site: Left Upper Arm)   Pulse 64   Ht 5' 10\" (1.778 m)   Wt 223 lb (101.2 kg)   SpO2 96%   BMI 32.00 kg/m²   BP Readings from Last 7 Encounters:   01/28/20 (!) 110/58   09/06/19 112/62   08/13/18 120/66     Wt Readings from Last 7 Encounters:   01/28/20 223 lb (101.2 kg)   09/06/19 225 lb 12.8 oz (102.4 kg)   08/13/18 226 lb (102.5 kg)     BMI Readings from Last 7 Encounters:   01/28/20 32.00 kg/m²   09/06/19 32.40 kg/m²   08/13/18 32.43 kg/m²     Resp Readings from Last 7 Encounters:   No data found for Resp       Physical Exam  Constitutional:       General: He is not in acute distress. Eyes:      General: No scleral icterus. Neck:      Musculoskeletal: Neck supple. Cardiovascular:      Heart sounds: Normal heart sounds. Pulmonary:      Breath sounds: Normal breath sounds. Lymphadenopathy:      Cervical: No cervical adenopathy. Skin:     Findings: No rash.          Results for orders placed or performed in visit on 09/03/19   Urinalysis Reflex to Culture   Result Value Ref Range    Color, UA YELLOW Straw/Yellow    Clarity, UA Clear Clear    Glucose, Ur Negative Negative mg/dL    Bilirubin Urine Negative Negative    Ketones, Urine Negative Negative mg/dL    Specific Gravity, UA 1.011 1.005 - 1.030    Blood, Urine Negative Negative    pH, UA 6.5 5.0 - 8.0    Protein, UA Negative Negative mg/dL    Urobilinogen, Urine 0.2 <2.0 E.U./dL    Nitrite, Urine Negative Negative    Leukocyte Esterase, Urine Negative Negative    Urine Reflex to Culture Not Indicated        ASSESSMENT/ PLAN:  1. MICHELLE (obstructive sleep apnea)  Patient does have obstructive sleep apnea we reviewed his 30-day CPAP trial he wore it greater than 6 hours for 27 of the nights he is very compliant with it it is helping him feel better on average wears it 6 hours 41minutes. Continue with CPAP use    2. Pure hypercholesterolemia  Stable continue current statin therapy    3. Hyperglycemia  Follow blood sugar healthy diet exercise he is working on this    4. Essential hypertension  Blood pressure stable    5. Paroxysmal atrial fibrillation (HCC)  Heart rate sounds regular today he is doing well    6.  Coronary artery disease involving native coronary artery of native heart without angina pectoris  Stable

## 2020-03-04 DIAGNOSIS — N40.1 BENIGN PROSTATIC HYPERPLASIA WITH LOWER URINARY TRACT SYMPTOMS, SYMPTOM DETAILS UNSPECIFIED: ICD-10-CM

## 2020-03-04 RX ORDER — FINASTERIDE 5 MG/1
TABLET, FILM COATED ORAL
Qty: 90 TABLET | Refills: 0 | Status: SHIPPED | OUTPATIENT
Start: 2020-03-04 | End: 2020-06-11 | Stop reason: SDUPTHER

## 2020-03-25 PROBLEM — I25.10 CORONARY ARTERY DISEASE: Status: RESOLVED | Noted: 2020-03-25 | Resolved: 2020-03-24

## 2020-06-11 ENCOUNTER — TELEPHONE (OUTPATIENT)
Dept: UROLOGY | Facility: CLINIC | Age: 65
End: 2020-06-11

## 2020-06-11 DIAGNOSIS — N40.1 BENIGN PROSTATIC HYPERPLASIA WITH LOWER URINARY TRACT SYMPTOMS, SYMPTOM DETAILS UNSPECIFIED: ICD-10-CM

## 2020-06-11 RX ORDER — FINASTERIDE 5 MG/1
5 TABLET, FILM COATED ORAL DAILY
Qty: 90 TABLET | Refills: 3 | Status: SHIPPED | OUTPATIENT
Start: 2020-06-11 | End: 2020-11-12 | Stop reason: SDUPTHER

## 2020-06-11 NOTE — TELEPHONE ENCOUNTER
----- Message from Lucía Valdovinos sent at 6/11/2020  9:21 AM CDT -----  Regarding: [facility] PT  PT wants a refill on proscar please thanks

## 2020-07-30 RX ORDER — LOSARTAN POTASSIUM 25 MG/1
TABLET ORAL
Qty: 90 TABLET | Refills: 3 | Status: SHIPPED | OUTPATIENT
Start: 2020-07-30 | End: 2021-07-14

## 2020-09-01 DIAGNOSIS — I48.0 PAROXYSMAL ATRIAL FIBRILLATION (HCC): ICD-10-CM

## 2020-09-01 DIAGNOSIS — Z12.5 SCREENING FOR PROSTATE CANCER: ICD-10-CM

## 2020-09-01 DIAGNOSIS — E78.00 PURE HYPERCHOLESTEROLEMIA: ICD-10-CM

## 2020-09-01 DIAGNOSIS — R73.9 HYPERGLYCEMIA: ICD-10-CM

## 2020-09-01 LAB
ALBUMIN SERPL-MCNC: 4.5 G/DL (ref 3.5–5.2)
ALP BLD-CCNC: 40 U/L (ref 40–130)
ALT SERPL-CCNC: 24 U/L (ref 5–41)
ANION GAP SERPL CALCULATED.3IONS-SCNC: 12 MMOL/L (ref 7–19)
AST SERPL-CCNC: 18 U/L (ref 5–40)
BILIRUB SERPL-MCNC: 0.5 MG/DL (ref 0.2–1.2)
BUN BLDV-MCNC: 11 MG/DL (ref 8–23)
CALCIUM SERPL-MCNC: 9.4 MG/DL (ref 8.8–10.2)
CHLORIDE BLD-SCNC: 104 MMOL/L (ref 98–111)
CHOLESTEROL, TOTAL: 143 MG/DL (ref 160–199)
CO2: 24 MMOL/L (ref 22–29)
CREAT SERPL-MCNC: 0.9 MG/DL (ref 0.5–1.2)
GFR AFRICAN AMERICAN: >59
GFR NON-AFRICAN AMERICAN: >60
GLUCOSE BLD-MCNC: 107 MG/DL (ref 74–109)
HBA1C MFR BLD: 5.6 % (ref 4–6)
HCT VFR BLD CALC: 44 % (ref 42–52)
HDLC SERPL-MCNC: 71 MG/DL (ref 55–121)
HEMOGLOBIN: 14.6 G/DL (ref 14–18)
LDL CHOLESTEROL CALCULATED: 61 MG/DL
MCH RBC QN AUTO: 29.3 PG (ref 27–31)
MCHC RBC AUTO-ENTMCNC: 33.2 G/DL (ref 33–37)
MCV RBC AUTO: 88.2 FL (ref 80–94)
PDW BLD-RTO: 13.5 % (ref 11.5–14.5)
PLATELET # BLD: 204 K/UL (ref 130–400)
PMV BLD AUTO: 10.9 FL (ref 9.4–12.4)
POTASSIUM SERPL-SCNC: 4.4 MMOL/L (ref 3.5–5)
PROSTATE SPECIFIC ANTIGEN: 1.09 NG/ML (ref 0–4)
RBC # BLD: 4.99 M/UL (ref 4.7–6.1)
SODIUM BLD-SCNC: 140 MMOL/L (ref 136–145)
TOTAL PROTEIN: 6.6 G/DL (ref 6.6–8.7)
TRIGL SERPL-MCNC: 54 MG/DL (ref 0–149)
TSH SERPL DL<=0.05 MIU/L-ACNC: 2.76 UIU/ML (ref 0.27–4.2)
WBC # BLD: 6.7 K/UL (ref 4.8–10.8)

## 2020-09-08 ENCOUNTER — OFFICE VISIT (OUTPATIENT)
Dept: INTERNAL MEDICINE | Age: 65
End: 2020-09-08
Payer: MEDICARE

## 2020-09-08 VITALS
HEIGHT: 69 IN | SYSTOLIC BLOOD PRESSURE: 112 MMHG | DIASTOLIC BLOOD PRESSURE: 64 MMHG | HEART RATE: 60 BPM | WEIGHT: 210 LBS | BODY MASS INDEX: 31.1 KG/M2

## 2020-09-08 PROCEDURE — G8427 DOCREV CUR MEDS BY ELIG CLIN: HCPCS | Performed by: INTERNAL MEDICINE

## 2020-09-08 PROCEDURE — 90732 PPSV23 VACC 2 YRS+ SUBQ/IM: CPT | Performed by: INTERNAL MEDICINE

## 2020-09-08 PROCEDURE — 3017F COLORECTAL CA SCREEN DOC REV: CPT | Performed by: INTERNAL MEDICINE

## 2020-09-08 PROCEDURE — 4040F PNEUMOC VAC/ADMIN/RCVD: CPT | Performed by: INTERNAL MEDICINE

## 2020-09-08 PROCEDURE — 99213 OFFICE O/P EST LOW 20 MIN: CPT | Performed by: INTERNAL MEDICINE

## 2020-09-08 PROCEDURE — 1123F ACP DISCUSS/DSCN MKR DOCD: CPT | Performed by: INTERNAL MEDICINE

## 2020-09-08 PROCEDURE — 4004F PT TOBACCO SCREEN RCVD TLK: CPT | Performed by: INTERNAL MEDICINE

## 2020-09-08 PROCEDURE — G0439 PPPS, SUBSEQ VISIT: HCPCS | Performed by: INTERNAL MEDICINE

## 2020-09-08 PROCEDURE — G0009 ADMIN PNEUMOCOCCAL VACCINE: HCPCS | Performed by: INTERNAL MEDICINE

## 2020-09-08 PROCEDURE — G8417 CALC BMI ABV UP PARAM F/U: HCPCS | Performed by: INTERNAL MEDICINE

## 2020-09-08 ASSESSMENT — ENCOUNTER SYMPTOMS
EYE DISCHARGE: 0
ABDOMINAL PAIN: 0
EYE REDNESS: 0
COUGH: 0
SINUS PRESSURE: 0
BACK PAIN: 0
SHORTNESS OF BREATH: 0
ABDOMINAL DISTENTION: 0

## 2020-09-08 ASSESSMENT — PATIENT HEALTH QUESTIONNAIRE - PHQ9
SUM OF ALL RESPONSES TO PHQ QUESTIONS 1-9: 0
SUM OF ALL RESPONSES TO PHQ QUESTIONS 1-9: 0

## 2020-09-08 NOTE — PROGRESS NOTES
meetings of clubs or organizations: Not on file     Relationship status: Not on file    Intimate partner violence     Fear of current or ex partner: Not on file     Emotionally abused: Not on file     Physically abused: Not on file     Forced sexual activity: Not on file   Other Topics Concern    Not on file   Social History Narrative    Not on file       Allergies   Allergen Reactions    Ramipril      Lips swell, hives       Current Outpatient Medications   Medication Sig Dispense Refill    losartan (COZAAR) 25 MG tablet TAKE 1 TABLET ONCE DAILY ASDIRECTED 90 tablet 3    apixaban (ELIQUIS) 5 MG TABS tablet Take 5 mg by mouth 2 times daily      UNABLE TO FIND Indications: 4 life transger factor plus       NONFORMULARY Indications: 4 life cardio       atenolol (TENORMIN) 25 MG tablet TAKE 1 TABLET BY MOUTH ONCE DAILY 90 tablet 3    simvastatin (ZOCOR) 40 MG tablet TAKE 1 TABLET AT BEDTIME 90 tablet 3    tamsulosin (FLOMAX) 0.4 MG capsule TAKE 1 CAPSULE BY MOUTH ONCE DAILY 90 capsule 3    aspirin EC 81 MG EC tablet Take 81 mg by mouth daily      coenzyme Q10 100 MG CAPS capsule Take 100 mg by mouth daily      Krill Oil 1000 MG CAPS Take 1 capsule by mouth daily      omeprazole (PRILOSEC) 20 MG delayed release capsule Take 20 mg by mouth daily      finasteride (PROSCAR) 5 MG tablet TAKE 1 TABLET BY MOUTH ONCE DAILY 90 tablet 3     No current facility-administered medications for this visit. Review of Systems   Constitutional: Negative for chills, fatigue and fever. HENT: Negative for congestion and sinus pressure. Eyes: Negative for discharge and redness. Respiratory: Negative for cough and shortness of breath. Cardiovascular: Negative for chest pain, palpitations and leg swelling. Gastrointestinal: Negative for abdominal distention and abdominal pain. Genitourinary: Negative for dysuria, frequency and urgency. Musculoskeletal: Positive for arthralgias. Negative for back pain. Skin: Negative for rash and wound. Neurological: Negative for dizziness, light-headedness and headaches. Psychiatric/Behavioral: Negative for dysphoric mood and sleep disturbance. The patient is not nervous/anxious. /64   Pulse 60   Ht 5' 9\" (1.753 m)   Wt 210 lb (95.3 kg)   BMI 31.01 kg/m²   BP Readings from Last 7 Encounters:   09/08/20 112/64   01/28/20 (!) 110/58   09/06/19 112/62   08/13/18 120/66     Wt Readings from Last 7 Encounters:   09/08/20 210 lb (95.3 kg)   01/28/20 223 lb (101.2 kg)   09/06/19 225 lb 12.8 oz (102.4 kg)   08/13/18 226 lb (102.5 kg)     BMI Readings from Last 7 Encounters:   09/08/20 31.01 kg/m²   01/28/20 32.00 kg/m²   09/06/19 32.40 kg/m²   08/13/18 32.43 kg/m²     Resp Readings from Last 7 Encounters:   No data found for Resp       Physical Exam  Constitutional:       General: He is not in acute distress. Appearance: Normal appearance. He is well-developed. HENT:      Right Ear: External ear normal. Tympanic membrane is not injected. Left Ear: External ear normal. Tympanic membrane is not injected. Mouth/Throat:      Pharynx: No oropharyngeal exudate. Eyes:      General: No scleral icterus. Conjunctiva/sclera: Conjunctivae normal.   Neck:      Musculoskeletal: Neck supple. Thyroid: No thyroid mass or thyromegaly. Vascular: No carotid bruit. Cardiovascular:      Rate and Rhythm: Normal rate and regular rhythm. Heart sounds: S1 normal and S2 normal. No murmur. No S3 or S4 sounds. Pulmonary:      Effort: Pulmonary effort is normal. No respiratory distress. Breath sounds: Normal breath sounds. No wheezing or rales. Abdominal:      General: Bowel sounds are normal. There is no distension. Palpations: Abdomen is soft. There is no mass. Tenderness: There is no abdominal tenderness. Lymphadenopathy:      Cervical: No cervical adenopathy. Upper Body:      Right upper body: No supraclavicular adenopathy. Left upper body: No supraclavicular adenopathy. Skin:     Findings: No rash. Neurological:      Mental Status: He is alert and oriented to person, place, and time. Cranial Nerves: No cranial nerve deficit. Results for orders placed or performed in visit on 09/01/20   Psa screening   Result Value Ref Range    PSA 1.09 0.00 - 4.00 ng/mL   Lipid Panel   Result Value Ref Range    Cholesterol, Total 143 (L) 160 - 199 mg/dL    Triglycerides 54 0 - 149 mg/dL    HDL 71 55 - 121 mg/dL    LDL Calculated 61 <100 mg/dL   TSH without Reflex   Result Value Ref Range    TSH 2.760 0.270 - 4.200 uIU/mL   Hemoglobin A1C   Result Value Ref Range    Hemoglobin A1C 5.6 4.0 - 6.0 %   Comprehensive Metabolic Panel   Result Value Ref Range    Sodium 140 136 - 145 mmol/L    Potassium 4.4 3.5 - 5.0 mmol/L    Chloride 104 98 - 111 mmol/L    CO2 24 22 - 29 mmol/L    Anion Gap 12 7 - 19 mmol/L    Glucose 107 74 - 109 mg/dL    BUN 11 8 - 23 mg/dL    CREATININE 0.9 0.5 - 1.2 mg/dL    GFR Non-African American >60 >60    GFR African American >59 >59    Calcium 9.4 8.8 - 10.2 mg/dL    Total Protein 6.6 6.6 - 8.7 g/dL    Alb 4.5 3.5 - 5.2 g/dL    Total Bilirubin 0.5 0.2 - 1.2 mg/dL    Alkaline Phosphatase 40 40 - 130 U/L    ALT 24 5 - 41 U/L    AST 18 5 - 40 U/L   CBC   Result Value Ref Range    WBC 6.7 4.8 - 10.8 K/uL    RBC 4.99 4.70 - 6.10 M/uL    Hemoglobin 14.6 14.0 - 18.0 g/dL    Hematocrit 44.0 42.0 - 52.0 %    MCV 88.2 80.0 - 94.0 fL    MCH 29.3 27.0 - 31.0 pg    MCHC 33.2 33.0 - 37.0 g/dL    RDW 13.5 11.5 - 14.5 %    Platelets 194 074 - 316 K/uL    MPV 10.9 9.4 - 12.4 fL       ASSESSMENT/ PLAN:  1. Medicare annual wellness visit, subsequent  Chart medications labs vaccines reviewed keep up-to-date with routine medical care and screening call with any problems or complaints    2. Coronary artery disease involving native coronary artery of native heart without angina pectoris  Stable follow    3.  Essential hypertension  Stable follow    4. Pure hypercholesterolemia    - CBC; Future  - Comprehensive Metabolic Panel; Future  - TSH without Reflex; Future  - Lipid Panel; Future    5. MICHELLE (obstructive sleep apnea)  Patient is compliant with his CPAP machine wears nightly and benefits    6. Paroxysmal atrial fibrillation (HCC)  Stable follows with cardiology continue current plan of care    7. Need for prophylactic vaccination against Streptococcus pneumoniae (pneumococcus)    - Pneumococcal polysaccharide vaccine 23-valent PPSV23    8. Need for prophylactic vaccination and inoculation against varicella    - zoster recombinant adjuvanted vaccine [facility]) 50 MCG/0.5ML SUSR injection; Inject 0.5 mLs into the muscle once for 1 dose And repeat times one in 2- 6 months  Dispense: 0.5 mL; Refill: 1    9. Routine general medical examination at a health care facility  Keep up-to-date with routine care and screening we recommend a flu shot which she will get when available    10. Screening for malignant neoplasm of prostate    - Psa screening; Future    11. Hyperglycemia    - Hemoglobin A1C; Future                    Medicare Annual Wellness Visit  Name: Santo Borden Date: 2020   MRN: [id number] Sex: Male   Age: 77 y.o. Ethnicity: Non-/Non    : 1955 Race: Mary Nielsen is here for Medicare AWV    Screenings for behavioral, psychosocial and functional/safety risks, and cognitive dysfunction are all negative except as indicated below. These results, as well as other patient data from the [address] form, are documented in Flowsheets linked to this Encounter. Allergies   Allergen Reactions    Ramipril      Lips swell, hives         Prior to Visit Medications    Medication Sig Taking?  Authorizing Provider   losartan (COZAAR) 25 MG tablet TAKE 1 TABLET ONCE DAILY ASDIRECTED  Leigha Pal MD   apixaban (ELIQUIS) 5 MG TABS tablet Take 5 mg by mouth 2 times daily  Historical Provider, MD   UNABLE TO FIND Indications: 4 life transger factor plus   Historical Provider, MD   NONFORMULARY Indications: 4 life cardio   Historical Provider, MD   atenolol (TENORMIN) 25 MG tablet TAKE 1 TABLET BY MOUTH ONCE DAILY  Santos Langston MD   simvastatin (ZOCOR) 40 MG tablet TAKE 1 TABLET AT BEDTIME  Santos Langston MD   tamsulosin (FLOMAX) 0.4 MG capsule TAKE 1 CAPSULE BY MOUTH ONCE DAILY  Santos Langston MD   aspirin EC 81 MG EC tablet Take 81 mg by mouth daily  Historical Provider, MD   coenzyme Q10 100 MG CAPS capsule Take 100 mg by mouth daily  Historical Provider, MD   Elizabeth Blocker Oil 1000 MG CAPS Take 1 capsule by mouth daily  Historical Provider, MD   omeprazole (PRILOSEC) 20 MG delayed release capsule Take 20 mg by mouth daily  Historical Provider, MD   finasteride (PROSCAR) 5 MG tablet TAKE 1 TABLET BY MOUTH ONCE DAILY  Santos Langston MD         Past Medical History:   Diagnosis Date    Coronary artery disease     Essential hypertension     GERD (gastroesophageal reflux disease)     Hyperlipidemia     MICHELLE (obstructive sleep apnea) 1/28/2020       Past Surgical History:   Procedure Laterality Date    APPENDECTOMY      CORONARY ARTERY BYPASS GRAFT      PROSTATE SURGERY           Family History   Problem Relation Age of Onset    Prostate Cancer Father     Coronary Art Dis Other     High Cholesterol Other        CareTeam (Including outside providers/suppliers regularly involved in providing care):   Patient Care Team:  Santos Langston MD as PCP - General (Internal Medicine)  Santos Langston MD as PCP - Anny Loredoled Provider    Wt Readings from Last 3 Encounters:   09/08/20 210 lb (95.3 kg)   01/28/20 223 lb (101.2 kg)   09/06/19 225 lb 12.8 oz (102.4 kg)     Vitals:    09/08/20 0807   BP: 112/64   Pulse: 60   Weight: 210 lb (95.3 kg)   Height: 5' 9\" (1.753 m)     Body mass index is 31.01 kg/m².     Based upon direct observation of the patient, evaluation of cognition reveals recent and remote memory intact. Patient's complete Health Risk Assessment and screening values have been reviewed and are found in Flowsheets. The following problems were reviewed today and where indicated follow up appointments were made and/or referrals ordered. Positive Risk Factor Screenings with Interventions:     General Health:  General  In general, how would you say your health is?: Very Good  In the past 7 days, have you experienced any of the following? New or Increased Pain, New or Increased Fatigue, Loneliness, Social Isolation, Stress or Anger?: None of These  Do you get the social and emotional support that you need?: Yes  Do you have a Living Will?: (!) No  General Health Risk Interventions:  · consider living will     Health Habits/Nutrition:  Health Habits/Nutrition  Do you exercise for at least 20 minutes 2-3 times per week?: Yes  Have you lost any weight without trying in the past 3 months?: No  Do you eat fewer than 2 meals per day?: No  Have you seen a dentist within the past year?: (!) No  Body mass index is 31.01 kg/m².   Health Habits/Nutrition Interventions:  · keep up to date with routine care    Hearing/Vision:  No exam data present  Hearing/Vision  Do you or your family notice any trouble with your hearing?: (!) Yes  Do you have difficulty driving, watching TV, or doing any of your daily activities because of your eyesight?: No  Have you had an eye exam within the past year?: Yes  Hearing/Vision Interventions:  · no issues    Personalized Preventive Plan   Current Health Maintenance Status  Immunization History   Administered Date(s) Administered    Pneumococcal Polysaccharide ([id number]) 09/08/2020    Tdap (Boostrix, Adacel) 04/11/2016    Varicella (Varivax) 08/24/2015        Health Maintenance   Topic Date Due    Hepatitis C screen  1955    Shingles Vaccine (1 of 2) 10/19/2015    Annual Wellness Visit (AWV)  06/19/2019    Flu vaccine (1) 09/01/2020    Lipid screen  09/01/2021    Potassium monitoring  09/01/2021    Creatinine monitoring  09/01/2021    Colon cancer screen colonoscopy  09/11/2022    Diabetes screen  09/01/2023    DTaP/Tdap/Td vaccine (2 - Td) 04/11/2026    Pneumococcal 65+ years Vaccine  Completed    Hepatitis A vaccine  Aged Out    Hepatitis B vaccine  Aged Out    Hib vaccine  Aged Out    Meningococcal (ACWY) vaccine  Aged Out     Recommendations for [employer]sCloud Due: see orders and patient instructions/AVS.  . Recommended screening schedule for the next 5-10 years is provided to the patient in written form: see Patient Instructions/AVS.    Colette Olea was seen today for medicare aw. Diagnoses and all orders for this visit:    Medicare annual wellness visit, subsequent    Coronary artery disease involving native coronary artery of native heart without angina pectoris    Essential hypertension    Pure hypercholesterolemia  -     CBC; Future  -     Comprehensive Metabolic Panel; Future  -     TSH without Reflex; Future  -     Lipid Panel; Future    MICHELLE (obstructive sleep apnea)    Paroxysmal atrial fibrillation (HCC)    Need for prophylactic vaccination against Streptococcus pneumoniae (pneumococcus)  -     Pneumococcal polysaccharide vaccine 23-valent PPSV23    Need for prophylactic vaccination and inoculation against varicella  -     zoster recombinant adjuvanted vaccine (SHINGRIX) 50 MCG/0.5ML SUSR injection; Inject 0.5 mLs into the muscle once for 1 dose And repeat times one in 2- 6 months    Routine general medical examination at a health care facility    Screening for malignant neoplasm of prostate  -     Psa screening;  Future    Hyperglycemia  -     Hemoglobin A1C; Future

## 2020-09-08 NOTE — PATIENT INSTRUCTIONS
Personalized Preventive Plan for Isabel Davis - 9/8/2020  Medicare offers a range of preventive health benefits. Some of the tests and screenings are paid in full while other may be subject to a deductible, co-insurance, and/or copay. Some of these benefits include a comprehensive review of your medical history including lifestyle, illnesses that may run in your family, and various assessments and screenings as appropriate. After reviewing your medical record and screening and assessments performed today your provider may have ordered immunizations, labs, imaging, and/or referrals for you. A list of these orders (if applicable) as well as your Preventive Care list are included within your After Visit Summary for your review. Other Preventive Recommendations:    · A preventive eye exam performed by an eye specialist is recommended every 1-2 years to screen for glaucoma; cataracts, macular degeneration, and other eye disorders. · A preventive dental visit is recommended every 6 months. · Try to get at least 150 minutes of exercise per week or 10,000 steps per day on a pedometer . · Order or download the FREE \"Exercise & Physical Activity: Your Everyday Guide\" from [employer]. Call 5-657.480.7486 or search [employer] online. · You need 2008-5479 mg of calcium and 2452-9559 IU of vitamin D per day. It is possible to meet your calcium requirement with diet alone, but a vitamin D supplement is usually necessary to meet this goal.  · When exposed to the sun, use a sunscreen that protects against both UVA and UVB radiation with an SPF of 30 or greater. Reapply every 2 to 3 hours or after sweating, drying off with a towel, or swimming. · Always wear a seat belt when traveling in a car. Always wear a helmet when riding a bicycle or motorcycle. Personalized Preventive Plan for Isabel Davis - 9/8/2020  Medicare offers a range of preventive health benefits.  Some of the tests and screenings are paid in full while other may be subject to a deductible, co-insurance, and/or copay. Some of these benefits include a comprehensive review of your medical history including lifestyle, illnesses that may run in your family, and various assessments and screenings as appropriate. After reviewing your medical record and screening and assessments performed today your provider may have ordered immunizations, labs, imaging, and/or referrals for you. A list of these orders (if applicable) as well as your Preventive Care list are included within your After Visit Summary for your review. Other Preventive Recommendations:    A preventive eye exam performed by an eye specialist is recommended every 1-2 years to screen for glaucoma; cataracts, macular degeneration, and other eye disorders. A preventive dental visit is recommended every 6 months. Try to get at least 150 minutes of exercise per week or 10,000 steps per day on a pedometer . Order or download the FREE \"Exercise & Physical Activity: Your Everyday Guide\" from [employer]. Call 3-652.307.6676 or search [employer] online. You need 2158-4996 mg of calcium and 5757-8457 IU of vitamin D per day. It is possible to meet your calcium requirement with diet alone, but a vitamin D supplement is usually necessary to meet this goal.  When exposed to the sun, use a sunscreen that protects against both UVA and UVB radiation with an SPF of 30 or greater. Reapply every 2 to 3 hours or after sweating, drying off with a towel, or swimming. Always wear a seat belt when traveling in a car. Always wear a helmet when riding a bicycle or motorcycle.

## 2020-09-14 DIAGNOSIS — R35.0 URINE FREQUENCY: ICD-10-CM

## 2020-09-16 NOTE — PROGRESS NOTES
Subjective:     Encounter Date:09/17/2020      Patient ID: Pawel Thompson is a 65 y.o. male with a history of coronary artery disease s/p 3v CABG in 2003, hypertension, hyperlipidemia, paroxysmal atrial fibrillation, long-term use of anticoagulation, and obesity.    Chief Complaint: follow up  Coronary Artery Disease  Presents for follow-up visit. Pertinent negatives include no chest pain, dizziness, leg swelling, palpitations, shortness of breath or weight gain. Risk factors include hyperlipidemia. The symptoms have been stable.   Atrial Fibrillation  Presents for follow-up visit. Symptoms are negative for chest pain, dizziness, palpitations, shortness of breath, syncope and weakness. The symptoms have been stable. Past medical history includes atrial fibrillation, CAD and hyperlipidemia.   Hypertension  This is a chronic problem. The current episode started more than 1 year ago. The problem has been rapidly improving since onset. The problem is controlled. Pertinent negatives include no chest pain, malaise/fatigue, orthopnea, palpitations, PND or shortness of breath.   Hyperlipidemia  This is a chronic problem. The current episode started more than 1 year ago. The problem is controlled. Recent lipid tests were reviewed and are low. Pertinent negatives include no chest pain or shortness of breath.     Patient presents today for a routine follow up. He has a history of CAD with 3v CABG in 2003. Most recent stress test was completed in 8/19 while he was inpatient due to new onset Afib. He reports he has been well. He has no complaints today. He reports palpitations with his previous episode of afib and has not had them since his hospitalization. He follows with Dr. Parks as his PCP. He had labs drawn a few weeks ago and got a good report.     The following portions of the patient's history were reviewed and updated as appropriate: allergies, current medications, past family history, past medical history, past  social history, past surgical history and problem list.    Allergies   Allergen Reactions   • Ramipril Angioedema     Lips swell, hives       Current Outpatient Medications:   •  apixaban (ELIQUIS) 5 MG tablet tablet, Take 1 tablet by mouth Every 12 (Twelve) Hours., Disp: 60 tablet, Rfl: 11  •  atenolol (TENORMIN) 25 MG tablet, Take 25 mg by mouth Daily., Disp: , Rfl:   •  finasteride (PROSCAR) 5 MG tablet, Take 1 tablet by mouth Daily., Disp: 90 tablet, Rfl: 3  •  losartan (COZAAR) 25 MG tablet, Take 25 mg by mouth Daily., Disp: , Rfl:   •  NON FORMULARY, Indications: 4 life transger factor plus, Disp: , Rfl:   •  Omega-3 Fatty Acids (fish oil) 1000 MG capsule capsule, Take 1,000 mg by mouth 2 (two) times a day., Disp: , Rfl:   •  omeprazole (priLOSEC) 20 MG capsule, Take 20 mg by mouth Daily., Disp: , Rfl:   •  PATIENT SUPPLIED MEDICATION, Indications: 4 life cardio, Disp: , Rfl:   •  simvastatin (ZOCOR) 40 MG tablet, Take 40 mg by mouth Every Night., Disp: , Rfl:   •  tamsulosin (FLOMAX) 0.4 MG capsule 24 hr capsule, TAKE 1 CAPSULE BY MOUTH ONCE DAILY, Disp: 90 capsule, Rfl: 3  Past Medical History:   Diagnosis Date   • Benign prostatic hyperplasia with lower urinary tract symptoms 12/6/2017   • Coronary artery disease    • Elevated PSA 12/6/2016   • Hyperlipidemia    • Hypertension    • New onset atrial fibrillation (CMS/HCC) 8/31/2019       Social History     Socioeconomic History   • Marital status:      Spouse name: Not on file   • Number of children: Not on file   • Years of education: Not on file   • Highest education level: Not on file   Tobacco Use   • Smoking status: Never Smoker   • Smokeless tobacco: Never Used   Substance and Sexual Activity   • Alcohol use: Yes     Alcohol/week: 1.0 standard drinks     Types: 1 Cans of beer per week     Comment: occasionally   • Drug use: No   • Sexual activity: Defer       Review of Systems   Constitution: Negative for malaise/fatigue, weight gain and weight  loss.   Cardiovascular: Negative for chest pain, dyspnea on exertion, irregular heartbeat, leg swelling, near-syncope, orthopnea, palpitations, paroxysmal nocturnal dyspnea and syncope.   Respiratory: Negative for cough, shortness of breath, sleep disturbances due to breathing, sputum production and wheezing.    Skin: Negative for dry skin, flushing, itching and rash.   Gastrointestinal: Negative for hematemesis and hematochezia.   Neurological: Negative for dizziness, light-headedness, loss of balance and weakness.   All other systems reviewed and are negative.         Objective:     Vitals signs reviewed.   Constitutional:       General: Not in acute distress.     Appearance: Well-developed. Not diaphoretic.   Eyes:      General: No scleral icterus.     Conjunctiva/sclera: Conjunctivae normal.      Pupils: Pupils are equal, round, and reactive to light.   HENT:      Head: Normocephalic.    Mouth/Throat:      Pharynx: No oropharyngeal exudate.   Neck:      Musculoskeletal: Normal range of motion and neck supple.   Pulmonary:      Effort: Pulmonary effort is normal. No respiratory distress.      Breath sounds: Normal breath sounds. No wheezing. No rales.   Chest:      Chest wall: Not tender to palpatation.   Cardiovascular:      Normal rate. Regular rhythm.   Pulses:     Intact distal pulses.   Edema:     Peripheral edema absent.   Abdominal:      General: Bowel sounds are normal. There is no distension.      Palpations: Abdomen is soft.      Tenderness: There is no abdominal tenderness.   Musculoskeletal: Normal range of motion.   Skin:     General: Skin is warm and dry.      Coloration: Skin is not pale.      Findings: No erythema or rash.   Neurological:      Mental Status: Alert and oriented to person, place, and time.      Deep Tendon Reflexes: Reflexes are normal and symmetric.   Psychiatric:         Behavior: Behavior normal.             ECG 12 Lead    Date/Time: 9/17/2020 9:14 AM  Performed by: Ebony Simon  "JOSETTE TILLMAN  Authorized by: Ebony Simon APRN   Comparison: compared with previous ECG from 1/20/2020  Rhythm: sinus bradycardia  Rate: bradycardic  BPM: 49  Conduction: conduction normal  Conduction: 1st degree AV block  QRS axis: normal  Other findings: T wave abnormality    Clinical impression: abnormal EKG          /66   Pulse (!) 49   Ht 175.3 cm (69\")   Wt 97.1 kg (214 lb)   SpO2 98%   BMI 31.60 kg/m²     Lab Review:   I have reviewed previous office notes, recent labs and recent cardiac testing.     Lab Results   Component Value Date    CHLPL 143 (L) 09/01/2020    TRIG 54 09/01/2020    HDL 71 09/01/2020    LDL 61 09/01/2020     Stress echo 8/19:   Interpretation Summary    · Low risk for ischemia.  · All left ventricular wall segments demonstrate appropriate increase in contractility with dobutamine infusion.           Assessment:          Diagnosis Plan   1. Coronary artery disease involving native coronary artery of native heart without angina pectoris     2. S/P CABG x 3     3. PAF (paroxysmal atrial fibrillation) (CMS/[facility])     4. Current use of long term anticoagulation     5. Essential hypertension     6. Mixed hyperlipidemia     7. Class 1 obesity due to excess calories with serious comorbidity and body mass index (BMI) of 33.0 to 33.9 in adult            Plan:       1. CAD- stable. No clinical signs of ischemia. stop ASA due to being on Eliquis. Continue Eliquis, Losartan, atenolol and zocor. Low risk stress in 2019   2. S/p CABG- 3v in 2003. Stable.   3. PAF- stable. NSR today. continue atenolol and eliquis  4. Anticoagulation- on appropriate dose of eliquis. Denies bleeding.   5. HTN- controlled. Continue current therapy. Followed by PCP   6. HLD- controlled with last LDL of 61. On zocor. I have discussed the importance of cholesterol control with a LDL goal of <70 as recommended by the AHA.    7. BMI- Patient's Body mass index is 31.6 kg/m². BMI is above normal parameters. Recommendations " include: educational material, exercise counseling and nutrition counseling.      Follow up in 1 year or sooner if symptoms worsen.     Advance Care Planning   ACP discussion was held with the patient during this visit. Patient does not have an advance directive, information provided.

## 2020-09-17 ENCOUNTER — OFFICE VISIT (OUTPATIENT)
Dept: CARDIOLOGY | Facility: CLINIC | Age: 65
End: 2020-09-17

## 2020-09-17 VITALS
OXYGEN SATURATION: 98 % | SYSTOLIC BLOOD PRESSURE: 126 MMHG | HEIGHT: 69 IN | HEART RATE: 49 BPM | WEIGHT: 214 LBS | BODY MASS INDEX: 31.7 KG/M2 | DIASTOLIC BLOOD PRESSURE: 66 MMHG

## 2020-09-17 DIAGNOSIS — I48.0 PAF (PAROXYSMAL ATRIAL FIBRILLATION) (HCC): ICD-10-CM

## 2020-09-17 DIAGNOSIS — I10 ESSENTIAL HYPERTENSION: ICD-10-CM

## 2020-09-17 DIAGNOSIS — Z95.1 S/P CABG X 3: ICD-10-CM

## 2020-09-17 DIAGNOSIS — I25.10 CORONARY ARTERY DISEASE INVOLVING NATIVE CORONARY ARTERY OF NATIVE HEART WITHOUT ANGINA PECTORIS: Primary | ICD-10-CM

## 2020-09-17 DIAGNOSIS — E78.2 MIXED HYPERLIPIDEMIA: ICD-10-CM

## 2020-09-17 DIAGNOSIS — Z79.01 CURRENT USE OF LONG TERM ANTICOAGULATION: ICD-10-CM

## 2020-09-17 DIAGNOSIS — E66.09 CLASS 1 OBESITY DUE TO EXCESS CALORIES WITH SERIOUS COMORBIDITY AND BODY MASS INDEX (BMI) OF 33.0 TO 33.9 IN ADULT: ICD-10-CM

## 2020-09-17 PROCEDURE — 99213 OFFICE O/P EST LOW 20 MIN: CPT | Performed by: NURSE PRACTITIONER

## 2020-09-17 PROCEDURE — 93000 ELECTROCARDIOGRAM COMPLETE: CPT | Performed by: NURSE PRACTITIONER

## 2020-09-17 RX ORDER — CHLORAL HYDRATE 500 MG
1000 CAPSULE ORAL NIGHTLY
COMMUNITY

## 2020-09-17 NOTE — PATIENT INSTRUCTIONS
Advance Care Planning and Advance Directives     You make decisions on a daily basis - decisions about where you want to live, your career, your home, your life. Perhaps one of the most important decisions you face is your choice for future medical care. Take time to talk with your family and your healthcare team and start planning today.  Advance Care Planning is a process that can help you:  · Understand possible future healthcare decisions in light of your own experiences  · Reflect on those decision in light of your goals and values  · Discuss your decisions with those closest to you and the healthcare professionals that care for you  · Make a plan by creating a document that reflects your wishes    Surrogate Decision Maker  In the event of a medical emergency, which has left you unable to communicate or to make your own decisions, you would need someone to make decisions for you.  It is important to discuss your preferences for medical treatment with this person while you are in good health.     Qualities of a surrogate decision maker:  • Willing to take on this role and responsibility  • Knows what you want for future medical care  • Willing to follow your wishes even if they don't agree with them  • Able to make difficult medical decisions under stressful circumstances    Advance Directives  These are legal documents you can create that will guide your healthcare team and decision maker(s) when needed. These documents can be stored in the electronic medical record.    · Living Will - a legal document to guide your care if you have a terminal condition or a serious illness and are unable to communicate. States vary by statute in document names/types, but most forms may include one or more of the following:        -  Directions regarding life-prolonging treatments        -  Directions regarding artificially provided nutrition/hydration        -  Choosing a healthcare decision maker        -  Direction  regarding organ/tissue donation    · Durable Power of  for Healthcare - this document names an -in-fact to make medical decisions for you, but it may also allow this person to make personal and financial decisions for you. Please seek the advice of an  if you need this type of document.    **Advance Directives are not required and no one may discriminate against you if you do not sign one.    Medical Orders  Many states allow specific forms/orders signed by your physician to record your wishes for medical treatment in your current state of health. This form, signed in personal communication with your physician, addresses resuscitation and other medical interventions that you may or may not want.      For more information or to schedule a time with a [facility] Advance Care Planning Facilitator contact: [email or web address] or call [phone] and someone will contact you directly.

## 2020-10-02 RX ORDER — ATENOLOL 25 MG/1
TABLET ORAL
Qty: 90 TABLET | Refills: 3 | Status: SHIPPED | OUTPATIENT
Start: 2020-10-02 | End: 2021-08-23

## 2020-10-14 RX ORDER — SIMVASTATIN 40 MG
TABLET ORAL
Qty: 90 TABLET | Refills: 3 | Status: SHIPPED | OUTPATIENT
Start: 2020-10-14 | End: 2021-10-12

## 2020-11-04 ENCOUNTER — RESULTS ENCOUNTER (OUTPATIENT)
Dept: UROLOGY | Facility: CLINIC | Age: 65
End: 2020-11-04

## 2020-11-04 DIAGNOSIS — R97.20 ELEVATED PSA: ICD-10-CM

## 2020-11-04 LAB — PSA SERPL-MCNC: 1.06 NG/ML (ref 0–4)

## 2020-11-12 ENCOUNTER — OFFICE VISIT (OUTPATIENT)
Dept: UROLOGY | Facility: CLINIC | Age: 65
End: 2020-11-12

## 2020-11-12 VITALS — BODY MASS INDEX: 32.02 KG/M2 | WEIGHT: 216.2 LBS | HEIGHT: 69 IN | TEMPERATURE: 97.1 F

## 2020-11-12 DIAGNOSIS — R35.0 URINE FREQUENCY: ICD-10-CM

## 2020-11-12 DIAGNOSIS — N13.8 BENIGN PROSTATIC HYPERPLASIA WITH URINARY OBSTRUCTION: Primary | ICD-10-CM

## 2020-11-12 DIAGNOSIS — N40.1 BENIGN PROSTATIC HYPERPLASIA WITH URINARY OBSTRUCTION: Primary | ICD-10-CM

## 2020-11-12 DIAGNOSIS — N40.1 BENIGN PROSTATIC HYPERPLASIA WITH LOWER URINARY TRACT SYMPTOMS, SYMPTOM DETAILS UNSPECIFIED: ICD-10-CM

## 2020-11-12 PROCEDURE — 81003 URINALYSIS AUTO W/O SCOPE: CPT | Performed by: UROLOGY

## 2020-11-12 PROCEDURE — 99213 OFFICE O/P EST LOW 20 MIN: CPT | Performed by: UROLOGY

## 2020-11-12 RX ORDER — TAMSULOSIN HYDROCHLORIDE 0.4 MG/1
1 CAPSULE ORAL DAILY
Qty: 90 CAPSULE | Refills: 3 | Status: SHIPPED | OUTPATIENT
Start: 2020-11-12 | End: 2021-09-14

## 2020-11-12 RX ORDER — FINASTERIDE 5 MG/1
5 TABLET, FILM COATED ORAL DAILY
Qty: 90 TABLET | Refills: 3 | Status: SHIPPED | OUTPATIENT
Start: 2020-11-12 | End: 2021-05-04 | Stop reason: SDUPTHER

## 2020-11-12 NOTE — PATIENT INSTRUCTIONS
"BMI for Adults  What is BMI?  Body mass index (BMI) is a number that is calculated from a person's weight and height. BMI can help estimate how much of a person's weight is composed of fat. BMI does not measure body fat directly. Rather, it is an alternative to procedures that directly measure body fat, which can be difficult and expensive.  BMI can help identify people who may be at higher risk for certain medical problems.  What are BMI measurements used for?  BMI is used as a screening tool to identify possible weight problems. It helps determine whether a person is obese, overweight, a healthy weight, or underweight.  BMI is useful for:  · Identifying a weight problem that may be related to a medical condition or may increase the risk for medical problems.  · Promoting changes, such as changes in diet and exercise, to help reach a healthy weight. BMI screening can be repeated to see if these changes are working.  How is BMI calculated?  BMI involves measuring your weight in relation to your height. Both height and weight are measured, and the BMI is calculated from those numbers. This can be done either in English ([place] or metric measurements. Note that charts and online BMI calculators are available to help you find your BMI quickly and easily without having to do these calculations yourself.  To calculate your BMI in English ([place] measurements:    1. Measure your weight in pounds (lb).  2. Multiply the number of pounds by 703.  ? For example, for a person who weighs 180 lb, multiply that number by 703, which equals 126,540.  3. Measure your height in inches. Then multiply that number by itself to get a measurement called \"inches squared.\"  ? For example, for a person who is 70 inches tall, the \"inches squared\" measurement is 70 inches x 70 inches, which equals 4,900 inches squared.  4. Divide the total from step 2 (number of lb x 703) by the total from step 3 (inches squared): 126,540 ÷ 4,900 = 25.8. This is " "your BMI.  To calculate your BMI in metric measurements:  1. Measure your weight in kilograms (kg).  2. Measure your height in meters (m). Then multiply that number by itself to get a measurement called \"meters squared.\"  ? For example, for a person who is 1.75 m tall, the \"meters squared\" measurement is 1.75 m x 1.75 m, which is equal to 3.1 meters squared.  3. Divide the number of kilograms (your weight) by the meters squared number. In this example: 70 ÷ 3.1 = 22.6. This is your BMI.  What do the results mean?  BMI charts are used to identify whether you are underweight, normal weight, overweight, or obese. The following guidelines will be used:  · Underweight: BMI less than 18.5.  · Normal weight: BMI between 18.5 and 24.9.  · Overweight: BMI between 25 and 29.9.  · Obese: BMI of 30 or above.  Keep these notes in mind:  · Weight includes both fat and muscle, so someone with a muscular build, such as an athlete, may have a BMI that is higher than 24.9. In cases like these, BMI is not an accurate measure of body fat.  · To determine if excess body fat is the cause of a BMI of 25 or higher, further assessments may need to be done by a health care provider.  · BMI is usually interpreted in the same way for men and women.  Where to find more information  For more information about BMI, including tools to quickly calculate your BMI, go to these websites:  · Centers for Disease Control and Prevention: www.cdc.gov  · American Heart Association: www.heart.org  · National Heart, Lung, and [employer]: www.nhlbi.nih.gov  Summary  · Body mass index (BMI) is a number that is calculated from a person's weight and height.  · BMI may help estimate how much of a person's weight is composed of fat. BMI can help identify those who may be at higher risk for certain medical problems.  · BMI can be measured using English measurements or metric measurements.  · BMI charts are used to identify whether you are underweight, normal " weight, overweight, or obese.  This information is not intended to replace advice given to you by your health care provider. Make sure you discuss any questions you have with your health care provider.  Document Released: 08/29/2005 Document Revised: 09/09/2020 Document Reviewed: 07/17/2020  Elsevier Patient Education © 2020 [employer]

## 2020-11-18 PROCEDURE — 87635 SARS-COV-2 COVID-19 AMP PRB: CPT | Performed by: FAMILY MEDICINE

## 2020-11-20 ENCOUNTER — TELEPHONE (OUTPATIENT)
Dept: INTERNAL MEDICINE | Age: 65
End: 2020-11-20

## 2021-03-15 ENCOUNTER — IMMUNIZATION (OUTPATIENT)
Dept: VACCINE CLINIC | Facility: HOSPITAL | Age: 66
End: 2021-03-15

## 2021-03-15 PROCEDURE — 0011A: CPT | Performed by: OBSTETRICS & GYNECOLOGY

## 2021-03-15 PROCEDURE — 91301 HC SARSCO02 VAC 100MCG/0.5ML IM: CPT | Performed by: OBSTETRICS & GYNECOLOGY

## 2021-04-12 ENCOUNTER — IMMUNIZATION (OUTPATIENT)
Dept: VACCINE CLINIC | Facility: HOSPITAL | Age: 66
End: 2021-04-12

## 2021-04-12 PROCEDURE — 0012A: CPT | Performed by: OBSTETRICS & GYNECOLOGY

## 2021-04-12 PROCEDURE — 91301 HC SARSCO02 VAC 100MCG/0.5ML IM: CPT | Performed by: OBSTETRICS & GYNECOLOGY

## 2021-05-04 DIAGNOSIS — N40.1 BENIGN PROSTATIC HYPERPLASIA WITH LOWER URINARY TRACT SYMPTOMS, SYMPTOM DETAILS UNSPECIFIED: ICD-10-CM

## 2021-05-04 RX ORDER — FINASTERIDE 5 MG/1
5 TABLET, FILM COATED ORAL DAILY
Qty: 90 TABLET | Refills: 3 | Status: SHIPPED | OUTPATIENT
Start: 2021-05-04 | End: 2021-12-01 | Stop reason: SDUPTHER

## 2021-05-12 DIAGNOSIS — G47.33 OSA (OBSTRUCTIVE SLEEP APNEA): Primary | ICD-10-CM

## 2021-05-12 PROBLEM — Z95.1 S/P CABG X 3: Status: ACTIVE | Noted: 2019-09-24

## 2021-05-12 PROBLEM — H25.9 AGE-RELATED CATARACT: Status: ACTIVE | Noted: 2018-08-24

## 2021-05-12 PROBLEM — Z79.01 CURRENT USE OF LONG TERM ANTICOAGULATION: Status: ACTIVE | Noted: 2019-09-24

## 2021-07-10 DIAGNOSIS — I10 ESSENTIAL HYPERTENSION: ICD-10-CM

## 2021-07-14 RX ORDER — LOSARTAN POTASSIUM 25 MG/1
TABLET ORAL
Qty: 90 TABLET | Refills: 3 | Status: SHIPPED | OUTPATIENT
Start: 2021-07-14 | End: 2022-06-29

## 2021-08-23 DIAGNOSIS — I10 ESSENTIAL HYPERTENSION: ICD-10-CM

## 2021-08-23 RX ORDER — ATENOLOL 25 MG/1
TABLET ORAL
Qty: 90 TABLET | Refills: 3 | Status: SHIPPED | OUTPATIENT
Start: 2021-08-23 | End: 2022-06-20

## 2021-08-23 NOTE — TELEPHONE ENCOUNTER
Jessica Salinas called requesting a refill of the below medication which has been pended for you:     Requested Prescriptions     Pending Prescriptions Disp Refills    atenolol (TENORMIN) 25 MG tablet [Pharmacy Med Name: ATENOLOL TAB 25MG] 90 tablet 3     Sig: TAKE 1 TABLET ONCE DAILY       Last Appointment Date: 9/8/2020  Next Appointment Date: 9/7/2021    Allergies   Allergen Reactions    Ramipril      Lips swell, hives

## 2021-08-30 ASSESSMENT — LIFESTYLE VARIABLES
HOW OFTEN DO YOU HAVE SIX OR MORE DRINKS ON ONE OCCASION: NEVER
HOW OFTEN DURING THE LAST YEAR HAVE YOU BEEN UNABLE TO REMEMBER WHAT HAPPENED THE NIGHT BEFORE BECAUSE YOU HAD BEEN DRINKING: NEVER
AUDIT TOTAL SCORE: 1
HOW MANY STANDARD DRINKS CONTAINING ALCOHOL DO YOU HAVE ON A TYPICAL DAY: 0
AUDIT TOTAL SCORE: 0
HOW OFTEN DURING THE LAST YEAR HAVE YOU NEEDED AN ALCOHOLIC DRINK FIRST THING IN THE MORNING TO GET YOURSELF GOING AFTER A NIGHT OF HEAVY DRINKING: 0
HOW OFTEN DURING THE LAST YEAR HAVE YOU HAD A FEELING OF GUILT OR REMORSE AFTER DRINKING: NEVER
HOW OFTEN DO YOU HAVE SIX OR MORE DRINKS ON ONE OCCASION: 0
AUDIT-C TOTAL SCORE: 1
HOW OFTEN DURING THE LAST YEAR HAVE YOU NEEDED AN ALCOHOLIC DRINK FIRST THING IN THE MORNING TO GET YOURSELF GOING AFTER A NIGHT OF HEAVY DRINKING: NEVER
HAVE YOU OR SOMEONE ELSE BEEN INJURED AS A RESULT OF YOUR DRINKING: 0
HOW OFTEN DURING THE LAST YEAR HAVE YOU FAILED TO DO WHAT WAS NORMALLY EXPECTED FROM YOU BECAUSE OF DRINKING: 0
HOW OFTEN DURING THE LAST YEAR HAVE YOU FOUND THAT YOU WERE NOT ABLE TO STOP DRINKING ONCE YOU HAD STARTED: NEVER
HOW OFTEN DURING THE LAST YEAR HAVE YOU FOUND THAT YOU WERE NOT ABLE TO STOP DRINKING ONCE YOU HAD STARTED: 0
HOW OFTEN DURING THE LAST YEAR HAVE YOU BEEN UNABLE TO REMEMBER WHAT HAPPENED THE NIGHT BEFORE BECAUSE YOU HAD BEEN DRINKING: 0
HOW MANY STANDARD DRINKS CONTAINING ALCOHOL DO YOU HAVE ON A TYPICAL DAY: ONE OR TWO
HAS A RELATIVE, FRIEND, DOCTOR, OR ANOTHER HEALTH PROFESSIONAL EXPRESSED CONCERN ABOUT YOUR DRINKING OR SUGGESTED YOU CUT DOWN: 0
HOW OFTEN DO YOU HAVE A DRINK CONTAINING ALCOHOL: MONTHLY OR LESS
AUDIT-C TOTAL SCORE: 0
HOW OFTEN DO YOU HAVE A DRINK CONTAINING ALCOHOL: 1
HAS A RELATIVE, FRIEND, DOCTOR, OR ANOTHER HEALTH PROFESSIONAL EXPRESSED CONCERN ABOUT YOUR DRINKING OR SUGGESTED YOU CUT DOWN: NO
HAVE YOU OR SOMEONE ELSE BEEN INJURED AS A RESULT OF YOUR DRINKING: NO
HOW OFTEN DURING THE LAST YEAR HAVE YOU HAD A FEELING OF GUILT OR REMORSE AFTER DRINKING: 0
HOW OFTEN DURING THE LAST YEAR HAVE YOU FAILED TO DO WHAT WAS NORMALLY EXPECTED FROM YOU BECAUSE OF DRINKING: NEVER

## 2021-08-30 ASSESSMENT — PATIENT HEALTH QUESTIONNAIRE - PHQ9
SUM OF ALL RESPONSES TO PHQ9 QUESTIONS 1 & 2: 0
2. FEELING DOWN, DEPRESSED OR HOPELESS: 0
SUM OF ALL RESPONSES TO PHQ QUESTIONS 1-9: 0
SUM OF ALL RESPONSES TO PHQ QUESTIONS 1-9: 0
1. LITTLE INTEREST OR PLEASURE IN DOING THINGS: 0
SUM OF ALL RESPONSES TO PHQ QUESTIONS 1-9: 0

## 2021-09-07 ENCOUNTER — OFFICE VISIT (OUTPATIENT)
Dept: INTERNAL MEDICINE | Age: 66
End: 2021-09-07
Payer: MEDICARE

## 2021-09-07 VITALS
BODY MASS INDEX: 32.32 KG/M2 | SYSTOLIC BLOOD PRESSURE: 110 MMHG | OXYGEN SATURATION: 98 % | RESPIRATION RATE: 16 BRPM | HEART RATE: 77 BPM | TEMPERATURE: 97 F | WEIGHT: 218.2 LBS | DIASTOLIC BLOOD PRESSURE: 70 MMHG | HEIGHT: 69 IN

## 2021-09-07 DIAGNOSIS — I10 ESSENTIAL HYPERTENSION: ICD-10-CM

## 2021-09-07 DIAGNOSIS — G47.33 OSA (OBSTRUCTIVE SLEEP APNEA): ICD-10-CM

## 2021-09-07 DIAGNOSIS — Z00.00 ROUTINE GENERAL MEDICAL EXAMINATION AT A HEALTH CARE FACILITY: ICD-10-CM

## 2021-09-07 DIAGNOSIS — Z23 NEED FOR PROPHYLACTIC VACCINATION AND INOCULATION AGAINST VARICELLA: ICD-10-CM

## 2021-09-07 DIAGNOSIS — I25.10 CORONARY ARTERY DISEASE INVOLVING NATIVE CORONARY ARTERY OF NATIVE HEART WITHOUT ANGINA PECTORIS: ICD-10-CM

## 2021-09-07 DIAGNOSIS — Z00.00 MEDICARE ANNUAL WELLNESS VISIT, SUBSEQUENT: Primary | ICD-10-CM

## 2021-09-07 DIAGNOSIS — I48.0 PAROXYSMAL ATRIAL FIBRILLATION (HCC): ICD-10-CM

## 2021-09-07 DIAGNOSIS — N40.1 BENIGN PROSTATIC HYPERPLASIA WITH LOWER URINARY TRACT SYMPTOMS, SYMPTOM DETAILS UNSPECIFIED: ICD-10-CM

## 2021-09-07 DIAGNOSIS — E78.00 PURE HYPERCHOLESTEROLEMIA: ICD-10-CM

## 2021-09-07 PROCEDURE — 1123F ACP DISCUSS/DSCN MKR DOCD: CPT | Performed by: INTERNAL MEDICINE

## 2021-09-07 PROCEDURE — G0439 PPPS, SUBSEQ VISIT: HCPCS | Performed by: INTERNAL MEDICINE

## 2021-09-07 PROCEDURE — 4040F PNEUMOC VAC/ADMIN/RCVD: CPT | Performed by: INTERNAL MEDICINE

## 2021-09-07 PROCEDURE — 3017F COLORECTAL CA SCREEN DOC REV: CPT | Performed by: INTERNAL MEDICINE

## 2021-09-07 SDOH — ECONOMIC STABILITY: FOOD INSECURITY: WITHIN THE PAST 12 MONTHS, YOU WORRIED THAT YOUR FOOD WOULD RUN OUT BEFORE YOU GOT MONEY TO BUY MORE.: NEVER TRUE

## 2021-09-07 SDOH — ECONOMIC STABILITY: FOOD INSECURITY: WITHIN THE PAST 12 MONTHS, THE FOOD YOU BOUGHT JUST DIDN'T LAST AND YOU DIDN'T HAVE MONEY TO GET MORE.: NEVER TRUE

## 2021-09-07 ASSESSMENT — PATIENT HEALTH QUESTIONNAIRE - PHQ9
SUM OF ALL RESPONSES TO PHQ QUESTIONS 1-9: 0
SUM OF ALL RESPONSES TO PHQ QUESTIONS 1-9: 0
2. FEELING DOWN, DEPRESSED OR HOPELESS: 0
1. LITTLE INTEREST OR PLEASURE IN DOING THINGS: 0
SUM OF ALL RESPONSES TO PHQ QUESTIONS 1-9: 0
SUM OF ALL RESPONSES TO PHQ9 QUESTIONS 1 & 2: 0

## 2021-09-07 ASSESSMENT — SOCIAL DETERMINANTS OF HEALTH (SDOH): HOW HARD IS IT FOR YOU TO PAY FOR THE VERY BASICS LIKE FOOD, HOUSING, MEDICAL CARE, AND HEATING?: NOT HARD AT ALL

## 2021-09-07 ASSESSMENT — LIFESTYLE VARIABLES: HOW OFTEN DO YOU HAVE A DRINK CONTAINING ALCOHOL: 0

## 2021-09-07 NOTE — PATIENT INSTRUCTIONS
Personalized Preventive Plan for [facility] - 9/7/2021  Medicare offers a range of preventive health benefits. Some of the tests and screenings are paid in full while other may be subject to a deductible, co-insurance, and/or copay. Some of these benefits include a comprehensive review of your medical history including lifestyle, illnesses that may run in your family, and various assessments and screenings as appropriate. After reviewing your medical record and screening and assessments performed today your provider may have ordered immunizations, labs, imaging, and/or referrals for you. A list of these orders (if applicable) as well as your Preventive Care list are included within your After Visit Summary for your review. Other Preventive Recommendations:    · A preventive eye exam performed by an eye specialist is recommended every 1-2 years to screen for glaucoma; cataracts, macular degeneration, and other eye disorders. · A preventive dental visit is recommended every 6 months. · Try to get at least 150 minutes of exercise per week or 10,000 steps per day on a pedometer . · Order or download the FREE \"Exercise & Physical Activity: Your Everyday Guide\" from [employer]. Call 9-642.134.2068 or search [employer] online. · You need 7052-0826 mg of calcium and 8441-6577 IU of vitamin D per day. It is possible to meet your calcium requirement with diet alone, but a vitamin D supplement is usually necessary to meet this goal.  · When exposed to the sun, use a sunscreen that protects against both UVA and UVB radiation with an SPF of 30 or greater. Reapply every 2 to 3 hours or after sweating, drying off with a towel, or swimming. · Always wear a seat belt when traveling in a car. Always wear a helmet when riding a bicycle or motorcycle.

## 2021-09-07 NOTE — PROGRESS NOTES
Chief Complaint   Patient presents with   [facility] AW     Patient is doing well no complaints. HPI: Patient is here today for Medicare annual wellness visit and to follow-up hypertension hyperlipidemia coronary artery disease overall he feels okay some fatigue and arthralgias at times but generally doing well no fever no chills no chest pain no dyspnea no abdominal pain    Past Medical History:   Diagnosis Date    Coronary artery disease     Essential hypertension     GERD (gastroesophageal reflux disease)     Hyperlipidemia     MICHELLE (obstructive sleep apnea) 1/28/2020       Past Surgical History:   Procedure Laterality Date    APPENDECTOMY      CORONARY ARTERY BYPASS GRAFT      PROSTATE SURGERY         Family History   Problem Relation Age of Onset    Prostate Cancer Father     Coronary Art Dis Other     High Cholesterol Other        Social History     Socioeconomic History    Marital status:      Spouse name: Not on file    Number of children: Not on file    Years of education: Not on file    Highest education level: Not on file   Occupational History    Not on file   Tobacco Use    Smoking status: Never Smoker    Smokeless tobacco: Never Used   Substance and Sexual Activity    Alcohol use: Not on file    Drug use: Not on file    Sexual activity: Not on file   Other Topics Concern    Not on file   Social History Narrative    Not on file     Social Determinants of Health     Financial Resource Strain: Low Risk     Difficulty of Paying Living Expenses: Not hard at all   Food Insecurity: No Food Insecurity    Worried About Running Out of Food in the Last Year: Never true    Reji of Food in the Last Year: Never true   Transportation Needs:     Lack of Transportation (Medical):      Lack of Transportation (Non-Medical):    Physical Activity:     Days of Exercise per Week:     Minutes of Exercise per Session:    Stress:     Feeling of Stress :    Social Connections:     Frequency of Communication with Friends and Family:     Frequency of Social Gatherings with Friends and Family:     Attends Gnosticist Services:     Active Member of Clubs or Organizations:     Attends Club or Organization Meetings:     Marital Status:    Intimate Partner Violence:     Fear of Current or Ex-Partner:     Emotionally Abused:     Physically Abused:     Sexually Abused: Allergies   Allergen Reactions    Ramipril      Lips swell, hives       Current Outpatient Medications   Medication Sig Dispense Refill    atenolol (TENORMIN) 25 MG tablet TAKE 1 TABLET ONCE DAILY 90 tablet 3    losartan (COZAAR) 25 MG tablet TAKE 1 TABLET DAILY AS     DIRECTED 90 tablet 3    simvastatin (ZOCOR) 40 MG tablet TAKE 1 TABLET AT BEDTIME 90 tablet 3    apixaban (ELIQUIS) 5 MG TABS tablet Take 5 mg by mouth 2 times daily      UNABLE TO FIND Indications: 4 life transger factor plus       NONFORMULARY Indications: 4 life cardio       tamsulosin (FLOMAX) 0.4 MG capsule TAKE 1 CAPSULE BY MOUTH ONCE DAILY 90 capsule 3    aspirin EC 81 MG EC tablet Take 81 mg by mouth daily      coenzyme Q10 100 MG CAPS capsule Take 100 mg by mouth daily      Krill Oil 1000 MG CAPS Take 1 capsule by mouth daily      omeprazole (PRILOSEC) 20 MG delayed release capsule Take 20 mg by mouth daily      finasteride (PROSCAR) 5 MG tablet TAKE 1 TABLET BY MOUTH ONCE DAILY 90 tablet 3     No current facility-administered medications for this visit. Review of Systems   Constitutional: Negative for chills, fatigue and fever. HENT: Negative for congestion and sinus pressure. Eyes: Negative for discharge and redness. Respiratory: Negative for cough and shortness of breath. Cardiovascular: Negative for chest pain, palpitations and leg swelling. Gastrointestinal: Negative for abdominal distention and abdominal pain. Genitourinary: Negative for dysuria, frequency and urgency.    Musculoskeletal: Positive for arthralgias. Negative for back pain. Skin: Negative for rash and wound. Neurological: Negative for dizziness, light-headedness and headaches. Psychiatric/Behavioral: Negative for dysphoric mood and sleep disturbance. The patient is not nervous/anxious. /70   Pulse 77   Temp 97 °F (36.1 °C) (Temporal)   Resp 16   Ht 5' 9\" (1.753 m)   Wt 218 lb 3.2 oz (99 kg)   SpO2 98%   BMI 32.22 kg/m²   BP Readings from Last 7 Encounters:   09/07/21 110/70   09/08/20 112/64   01/28/20 (!) 110/58   09/06/19 112/62   08/13/18 120/66     Wt Readings from Last 7 Encounters:   09/07/21 218 lb 3.2 oz (99 kg)   09/08/20 210 lb (95.3 kg)   01/28/20 223 lb (101.2 kg)   09/06/19 225 lb 12.8 oz (102.4 kg)   08/13/18 226 lb (102.5 kg)     BMI Readings from Last 7 Encounters:   09/07/21 32.22 kg/m²   09/08/20 31.01 kg/m²   01/28/20 32.00 kg/m²   09/06/19 32.40 kg/m²   08/13/18 32.43 kg/m²     Resp Readings from Last 7 Encounters:   09/07/21 16       Physical Exam  Constitutional:       General: He is not in acute distress. Appearance: Normal appearance. He is well-developed. HENT:      Right Ear: External ear normal. Tympanic membrane is not injected. Left Ear: External ear normal. Tympanic membrane is not injected. Mouth/Throat:      Pharynx: No oropharyngeal exudate. Eyes:      General: No scleral icterus. Conjunctiva/sclera: Conjunctivae normal.   Neck:      Thyroid: No thyroid mass or thyromegaly. Vascular: No carotid bruit. Cardiovascular:      Rate and Rhythm: Normal rate and regular rhythm. Heart sounds: S1 normal and S2 normal. No murmur heard. No S3 or S4 sounds. Pulmonary:      Effort: Pulmonary effort is normal. No respiratory distress. Breath sounds: Normal breath sounds. No wheezing or rales. Abdominal:      General: Bowel sounds are normal. There is no distension. Palpations: Abdomen is soft. There is no mass. Tenderness:  There is no abdominal tenderness. Musculoskeletal:      Cervical back: Neck supple. Lymphadenopathy:      Cervical: No cervical adenopathy. Upper Body:      Right upper body: No supraclavicular adenopathy. Left upper body: No supraclavicular adenopathy. Skin:     Findings: No rash. Neurological:      Mental Status: He is alert and oriented to person, place, and time. Cranial Nerves: No cranial nerve deficit. Results for orders placed or performed in visit on 08/30/21   CBC   Result Value Ref Range    WBC 7.1 4.8 - 10.8 K/uL    RBC 4.82 4.70 - 6.10 M/uL    Hemoglobin 14.2 14.0 - 18.0 g/dL    Hematocrit 44.0 42.0 - 52.0 %    MCV 91.3 80.0 - 94.0 fL    MCH 29.5 27.0 - 31.0 pg    MCHC 32.3 (L) 33.0 - 37.0 g/dL    RDW 13.2 11.5 - 14.5 %    Platelets 225 955 - 247 K/uL    MPV 11.0 9.4 - 12.4 fL   Psa screening   Result Value Ref Range    PSA 0.97 0.00 - 4.00 ng/mL   Lipid Panel   Result Value Ref Range    Cholesterol, Total 146 (L) 160 - 199 mg/dL    Triglycerides 68 0 - 149 mg/dL    HDL 64 55 - 121 mg/dL    LDL Calculated 68 <100 mg/dL   TSH without Reflex   Result Value Ref Range    TSH 2.720 0.270 - 4.200 uIU/mL   Hemoglobin A1C   Result Value Ref Range    Hemoglobin A1C 5.6 4.0 - 6.0 %   Comprehensive Metabolic Panel   Result Value Ref Range    Sodium 140 136 - 145 mmol/L    Potassium 4.9 3.5 - 5.0 mmol/L    Chloride 102 98 - 111 mmol/L    CO2 26 22 - 29 mmol/L    Anion Gap 12 7 - 19 mmol/L    Glucose 107 74 - 109 mg/dL    BUN 12 8 - 23 mg/dL    CREATININE 1.0 0.5 - 1.2 mg/dL    GFR Non-African American >60 >60    GFR African American >59 >59    Calcium 9.5 8.8 - 10.2 mg/dL    Total Protein 6.8 6.6 - 8.7 g/dL    Albumin 4.6 3.5 - 5.2 g/dL    Total Bilirubin 0.4 0.2 - 1.2 mg/dL    Alkaline Phosphatase 42 40 - 130 U/L    ALT 21 5 - 41 U/L    AST 18 5 - 40 U/L       ASSESSMENT/ PLAN:  1. Medicare annual wellness visit, subsequent  Chart, medications, labs, vaccines reviewed.   Keep up to date with routine care and follow up. Call with any problems or complaints. Keep up to date with routine screening recomendations and vaccines. We recommend the COVID-19 vaccine and he has had it. He also has a history of Covid    2. Routine general medical examination at a health care facility  Chart, medications, labs, vaccines reviewed. Keep up to date with routine care and follow up. Call with any problems or complaints. Keep up to date with routine screening recomendations and vaccines. 3. Need for prophylactic vaccination and inoculation against varicella    - zoster recombinant adjuvanted vaccine [facility]) 50 MCG/0.5ML SUSR injection; Inject 0.5 mLs into the muscle once for 1 dose And repeat times 1 in 2-6 months  Dispense: 0.5 mL; Refill: 1    4. Paroxysmal atrial fibrillation (HCC)  Stable history paroxysmal atrial fib he is tolerating his medications and his anticoagulant therapy no bleeding issues    5. Pure hypercholesterolemia  Tinea high-dose statin therapy  - CBC; Future  - Comprehensive Metabolic Panel; Future  - TSH without Reflex; Future  - Lipid Panel; Future    6. Coronary artery disease involving native coronary artery of native heart without angina pectoris  Family history CAD actually his lipid panel is in the goals of therapy when he first had CAD we have pushed him to high-dose statin therapy after that time continue aggressive management close observation with cardiology    7. Benign prostatic hyperplasia with lower urinary tract symptoms, symptom details unspecified  Patient also follows with urology regarding BPH    8. Essential hypertension  Patient's blood pressure is very well controlled continue the current medical management medications also for coronary artery disease continue current plan of care follow    9.  MICHELLE (obstructive sleep apnea)  Pt wears a cpap nightly and benefits from its use                    Medicare Annual Wellness Visit  Name: Garwin Goodell Date: 9/19/2021   MRN: [id number] Sex: Male   Age: 74 y.o. Ethnicity: Non- / Non    : 1955 Race: White (non-)      Salud Mazariegos is here for Medicare AWV (Patient is doing well no complaints.)    Screenings for behavioral, psychosocial and functional/safety risks, and cognitive dysfunction are all negative except as indicated below. These results, as well as other patient data from the [address] form, are documented in Flowsheets linked to this Encounter. Allergies   Allergen Reactions    Ramipril      Lips swell, hives         Prior to Visit Medications    Medication Sig Taking?  Authorizing Provider   atenolol (TENORMIN) 25 MG tablet TAKE 1 TABLET ONCE DAILY Yes Snehal Santiago MD   losartan (COZAAR) 25 MG tablet TAKE 1 TABLET DAILY AS     DIRECTED Yes Snehal Santiago MD   simvastatin (ZOCOR) 40 MG tablet TAKE 1 TABLET AT BEDTIME Yes Snehal Santiago MD   apixaban (ELIQUIS) 5 MG TABS tablet Take 5 mg by mouth 2 times daily Yes Historical Provider, MD   UNABLE TO FIND Indications: 4 life transger factor plus  Yes Historical Provider, MD   NONFORMULARY Indications: 4 life cardio  Yes Historical Provider, MD   tamsulosin (FLOMAX) 0.4 MG capsule TAKE 1 CAPSULE BY MOUTH ONCE DAILY Yes Snehal Santiago MD   aspirin EC 81 MG EC tablet Take 81 mg by mouth daily Yes Historical Provider, MD   coenzyme Q10 100 MG CAPS capsule Take 100 mg by mouth daily Yes Historical Provider, MD [place] Oil 1000 MG CAPS Take 1 capsule by mouth daily Yes Historical Provider, MD   omeprazole (PRILOSEC) 20 MG delayed release capsule Take 20 mg by mouth daily Yes Historical Provider, MD   finasteride (PROSCAR) 5 MG tablet TAKE 1 TABLET BY MOUTH ONCE DAILY Yes Snehal Santiago MD         Past Medical History:   Diagnosis Date    Coronary artery disease     Essential hypertension     GERD (gastroesophageal reflux disease)     Hyperlipidemia     MICHELLE (obstructive sleep apnea) 2020       Past Surgical History:   Procedure Laterality Date    APPENDECTOMY      CORONARY ARTERY BYPASS GRAFT      PROSTATE SURGERY           Family History   Problem Relation Age of Onset    Prostate Cancer Father     Coronary Art Dis Other     High Cholesterol Other        CareTeam (Including outside providers/suppliers regularly involved in providing care):   Patient Care Team:  Snehal Santiago MD as PCP - General (Internal Medicine)  Snehal Santiago MD as PCP - [facility] [place] Dash Provider    Wt Readings from Last 3 Encounters:   09/07/21 218 lb 3.2 oz (99 kg)   09/08/20 210 lb (95.3 kg)   01/28/20 223 lb (101.2 kg)     Vitals:    09/07/21 0826   BP: 110/70   Pulse: 77   Resp: 16   Temp: 97 °F (36.1 °C)   TempSrc: Temporal   SpO2: 98%   Weight: 218 lb 3.2 oz (99 kg)   Height: 5' 9\" (1.753 m)     Body mass index is 32.22 kg/m². Based upon direct observation of the patient, evaluation of cognition reveals recent and remote memory intact. Patient's complete Health Risk Assessment and screening values have been reviewed and are found in Flowsheets. The following problems were reviewed today and where indicated follow up appointments were made and/or referrals ordered. Positive Risk Factor Screenings with Interventions:            General Health and ACP:  General  In general, how would you say your health is?: Very Good  In the past 7 days, have you experienced any of the following?  New or Increased Pain, New or Increased Fatigue, Loneliness, Social Isolation, Stress or Anger?: None of These  Do you get the social and emotional support that you need?: Yes  Do you have a Living Will?: Yes  Advance Directives     Power of [address] [place] [address] Will ACP-Advance Directive ACP-Power of     Not on File Not on File Not on File Not on File      General Health Risk Interventions:  · no issues    Health Habits/Nutrition:  Health Habits/Nutrition  Do you exercise for at least 20 minutes 2-3 times per week?: Yes  Have you lost any weight without trying in the past 3 months?: No  Do you eat only one meal per day?: No  Have you seen the dentist within the past year?: Appointment is scheduled  Body mass index: (!) 32.22  Health Habits/Nutrition Interventions:  · Work on healthy diet and exercise       Personalized Preventive Plan   Current Health Maintenance Status  Immunization History   Administered Date(s) Administered    COVID-19, Moderna, PF, 100mcg/0.5mL 03/15/2021, 04/12/2021    Pneumococcal Polysaccharide ([id number]) 09/08/2020    Tdap (Boostrix, Adacel) 04/11/2016    Varicella (Varivax) 08/24/2015    Zoster Live (Zostavax) 08/24/2015        Health Maintenance   Topic Date Due    Hepatitis C screen  Never done    Shingles Vaccine (1 of 2) 10/19/2015    Annual Wellness Visit (AWV)  Never done    Flu vaccine (1) Never done    Lipid screen  08/30/2022    Potassium monitoring  08/30/2022    Creatinine monitoring  08/30/2022    Colon cancer screen colonoscopy  09/11/2022    Diabetes screen  08/30/2024    DTaP/Tdap/Td vaccine (2 - Td or Tdap) 04/11/2026    Pneumococcal 65+ years Vaccine  Completed    COVID-19 Vaccine  Completed    Hepatitis A vaccine  Aged Out    Hepatitis B vaccine  Aged Out    Hib vaccine  Aged Out    Meningococcal (ACWY) vaccine  Aged Out     Recommendations for [place] Due: see orders and patient instructions/AVS.  . Recommended screening schedule for the next 5-10 years is provided to the patient in written form: see Patient Instructions/AVS.    Lavell Aguilar was seen today for medicare awv. Diagnoses and all orders for this visit:    Medicare annual wellness visit, subsequent    Routine general medical examination at a health care facility    Need for prophylactic vaccination and inoculation against varicella  -     zoster recombinant adjuvanted vaccine [facility]) 50 MCG/0.5ML SUSR injection;  Inject 0.5 mLs into the muscle once for 1 dose And repeat times 1 in 2-6 months    Paroxysmal atrial fibrillation (HCC)    Pure hypercholesterolemia  -     CBC; Future  -     Comprehensive Metabolic Panel; Future  -     TSH without Reflex; Future  -     Lipid Panel;  Future    Coronary artery disease involving native coronary artery of native heart without angina pectoris    Benign prostatic hyperplasia with lower urinary tract symptoms, symptom details unspecified    Essential hypertension    MICHELLE (obstructive sleep apnea)

## 2021-09-14 DIAGNOSIS — N13.8 BENIGN PROSTATIC HYPERPLASIA WITH URINARY OBSTRUCTION: ICD-10-CM

## 2021-09-14 DIAGNOSIS — N40.1 BENIGN PROSTATIC HYPERPLASIA WITH URINARY OBSTRUCTION: ICD-10-CM

## 2021-09-14 RX ORDER — TAMSULOSIN HYDROCHLORIDE 0.4 MG/1
CAPSULE ORAL
Qty: 30 CAPSULE | Refills: 0 | Status: SHIPPED | OUTPATIENT
Start: 2021-09-14 | End: 2021-11-01

## 2021-09-16 ENCOUNTER — TELEPHONE (OUTPATIENT)
Dept: CARDIOLOGY | Facility: CLINIC | Age: 66
End: 2021-09-16

## 2021-09-16 RX ORDER — APIXABAN 5 MG/1
TABLET, FILM COATED ORAL
Qty: 60 TABLET | Refills: 11 | Status: SHIPPED | OUTPATIENT
Start: 2021-09-16 | End: 2021-12-01 | Stop reason: SDUPTHER

## 2021-09-17 ENCOUNTER — OFFICE VISIT (OUTPATIENT)
Dept: CARDIOLOGY | Facility: CLINIC | Age: 66
End: 2021-09-17

## 2021-09-17 VITALS
BODY MASS INDEX: 31.99 KG/M2 | WEIGHT: 216 LBS | SYSTOLIC BLOOD PRESSURE: 128 MMHG | DIASTOLIC BLOOD PRESSURE: 72 MMHG | OXYGEN SATURATION: 97 % | HEIGHT: 69 IN | HEART RATE: 47 BPM

## 2021-09-17 DIAGNOSIS — I48.0 PAF (PAROXYSMAL ATRIAL FIBRILLATION) (HCC): ICD-10-CM

## 2021-09-17 DIAGNOSIS — Z95.1 S/P CABG X 3: ICD-10-CM

## 2021-09-17 DIAGNOSIS — I10 ESSENTIAL HYPERTENSION: ICD-10-CM

## 2021-09-17 DIAGNOSIS — Z79.01 CURRENT USE OF LONG TERM ANTICOAGULATION: ICD-10-CM

## 2021-09-17 DIAGNOSIS — E78.2 MIXED HYPERLIPIDEMIA: ICD-10-CM

## 2021-09-17 DIAGNOSIS — I25.10 CORONARY ARTERY DISEASE INVOLVING NATIVE CORONARY ARTERY OF NATIVE HEART WITHOUT ANGINA PECTORIS: Primary | ICD-10-CM

## 2021-09-17 PROCEDURE — 99214 OFFICE O/P EST MOD 30 MIN: CPT | Performed by: NURSE PRACTITIONER

## 2021-09-17 PROCEDURE — 93000 ELECTROCARDIOGRAM COMPLETE: CPT | Performed by: NURSE PRACTITIONER

## 2021-09-17 RX ORDER — ASPIRIN 81 MG/1
81 TABLET ORAL DAILY
COMMUNITY
End: 2021-11-12

## 2021-09-17 RX ORDER — UBIDECARENONE 100 MG
100 CAPSULE ORAL DAILY
COMMUNITY
End: 2022-11-14

## 2021-09-17 NOTE — PROGRESS NOTES
Subjective:     Encounter Date:09/17/2021      Patient ID: Pawel Thompson is a 66 y.o. male with a history of coronary artery disease s/p 3v CABG, hypertension, hyperlipidemia, paroxysmal atrial fibrillation, long-term use of anticoagulation, and obesity.    Chief Complaint: follow up  Coronary Artery Disease  Presents for follow-up visit. Pertinent negatives include no chest pain, dizziness, leg swelling, palpitations, shortness of breath or weight gain. Risk factors include hyperlipidemia. The symptoms have been stable.   Atrial Fibrillation  Presents for follow-up visit. Symptoms are negative for chest pain, dizziness, palpitations, shortness of breath, syncope and weakness. The symptoms have been stable. Past medical history includes atrial fibrillation, CAD and hyperlipidemia.   Hypertension  This is a chronic problem. The current episode started more than 1 year ago. The problem has been rapidly improving since onset. The problem is controlled. Pertinent negatives include no chest pain, malaise/fatigue, orthopnea, palpitations, PND or shortness of breath.   Hyperlipidemia  This is a chronic problem. The current episode started more than 1 year ago. The problem is controlled. Recent lipid tests were reviewed and are low. Pertinent negatives include no chest pain or shortness of breath.     Patient presents today for a routine follow up. He has a history of CAD with 3v CABG in 2003. He had cath in 2013 which revealed patent grafts. Most recent stress test was completed in 8/2019 while he was inpatient due to new onset Afib and was noted to be low risk for ischemia. His ASA was stopped at his last visit as he is anticoagulated with Eliquis but reports his PCP recommended he go back on it due to Eliquis and ASA working differently. He reports he has been well. He has no complaints today. He reports palpitations with his previous episode of afib and has not had them since his hospitalization in 2018.    The  following portions of the patient's history were reviewed and updated as appropriate: allergies, current medications, past family history, past medical history, past social history, past surgical history and problem list.    Allergies   Allergen Reactions   • Ramipril Angioedema     Lips swell, hives       Current Outpatient Medications:   •  aspirin 81 MG EC tablet, Take 81 mg by mouth Daily., Disp: , Rfl:   •  atenolol (TENORMIN) 25 MG tablet, Take 25 mg by mouth Daily., Disp: , Rfl:   •  coenzyme Q10 100 MG capsule, Take 100 mg by mouth Daily., Disp: , Rfl:   •  Eliquis 5 MG tablet tablet, TAKE 1 TABLET BY MOUTH EVERY 12 HOURS., Disp: 60 tablet, Rfl: 11  •  finasteride (PROSCAR) 5 MG tablet, Take 1 tablet by mouth Daily., Disp: 90 tablet, Rfl: 3  •  losartan (COZAAR) 25 MG tablet, Take 25 mg by mouth Daily., Disp: , Rfl:   •  Omega-3 Fatty Acids (fish oil) 1000 MG capsule capsule, Take 1,000 mg by mouth 2 (two) times a day., Disp: , Rfl:   •  omeprazole (priLOSEC) 20 MG capsule, Take 20 mg by mouth Daily., Disp: , Rfl:   •  PATIENT SUPPLIED MEDICATION, Indications: 4 life cardio, Disp: , Rfl:   •  simvastatin (ZOCOR) 40 MG tablet, Take 40 mg by mouth Every Night., Disp: , Rfl:   •  tamsulosin (FLOMAX) 0.4 MG capsule 24 hr capsule, TAKE 1 CAPSULE BY MOUTH ONCE DAILY, Disp: 30 capsule, Rfl: 0  Past Medical History:   Diagnosis Date   • Benign prostatic hyperplasia with lower urinary tract symptoms 12/6/2017   • Coronary artery disease    • COVID-19    • Elevated PSA 12/6/2016   • Hyperlipidemia    • Hypertension    • New onset atrial fibrillation (CMS/HCC) 8/31/2019       Social History     Socioeconomic History   • Marital status:      Spouse name: Not on file   • Number of children: Not on file   • Years of education: Not on file   • Highest education level: Not on file   Tobacco Use   • Smoking status: Never Smoker   • Smokeless tobacco: Never Used   Substance and Sexual Activity   • Alcohol use: Yes      Alcohol/week: 1.0 standard drinks     Types: 1 Cans of beer per week     Comment: occasionally   • Drug use: No   • Sexual activity: Defer       Review of Systems   Constitutional: Negative for malaise/fatigue, weight gain and weight loss.   Cardiovascular: Negative for chest pain, dyspnea on exertion, irregular heartbeat, leg swelling, near-syncope, orthopnea, palpitations, paroxysmal nocturnal dyspnea and syncope.   Respiratory: Negative for cough, shortness of breath, sleep disturbances due to breathing, sputum production and wheezing.    Skin: Negative for dry skin, flushing, itching and rash.   Gastrointestinal: Negative for hematemesis and hematochezia.   Neurological: Negative for dizziness, light-headedness, loss of balance and weakness.   All other systems reviewed and are negative.         Objective:     Vitals reviewed.   Constitutional:       General: Not in acute distress.     Appearance: Well-developed. Not diaphoretic.   Eyes:      General: No scleral icterus.     Conjunctiva/sclera: Conjunctivae normal.      Pupils: Pupils are equal, round, and reactive to light.   HENT:      Head: Normocephalic.    Mouth/Throat:      Pharynx: No oropharyngeal exudate.   Pulmonary:      Effort: Pulmonary effort is normal. No respiratory distress.      Breath sounds: Normal breath sounds. No wheezing. No rales.   Chest:      Chest wall: Not tender to palpatation.   Cardiovascular:      Normal rate. Regular rhythm.   Pulses:     Intact distal pulses.   Edema:     Peripheral edema absent.   Abdominal:      General: Bowel sounds are normal. There is no distension.      Palpations: Abdomen is soft.      Tenderness: There is no abdominal tenderness.   Musculoskeletal: Normal range of motion.      Cervical back: Normal range of motion and neck supple. Skin:     General: Skin is warm and dry.      Coloration: Skin is not pale.      Findings: No erythema or rash.   Neurological:      Mental Status: Alert and oriented to  "person, place, and time.      Deep Tendon Reflexes: Reflexes are normal and symmetric.   Psychiatric:         Behavior: Behavior normal.             ECG 12 Lead    Date/Time: 9/17/2021 9:43 AM  Performed by: Ebony Simon APRN  Authorized by: Ebony Simon APRN   Comparison: compared with previous ECG from 9/17/2020  Similar to previous ECG  Rhythm: sinus bradycardia  Rate: bradycardic  BPM: 47  Conduction: 1st degree AV block  Q waves: V1    T inversion: aVR  QRS axis: normal    Clinical impression: abnormal EKG          /72   Pulse (!) 47   Ht 175.3 cm (69\")   Wt 98 kg (216 lb)   SpO2 97%   BMI 31.90 kg/m²     Lab Review:   I have reviewed previous office notes, recent labs and recent cardiac testing.       Stress echo 8/2019:   Interpretation Summary    · Low risk for ischemia.  · All left ventricular wall segments demonstrate appropriate increase in contractility with dobutamine infusion.         Assessment:          Diagnosis Plan   1. Coronary artery disease involving native coronary artery of native heart without angina pectoris     2. S/P CABG x 3     3. PAF (paroxysmal atrial fibrillation) (CMS/[facility])     4. Current use of long term anticoagulation     5. Essential hypertension     6. Mixed hyperlipidemia            Plan:       1. CAD- stable. No clinical signs of ischemia. Ok to continue ASA and Eliquis but educated that he is at increased risk for bleeding and only requires use of either ASA or Eliquis. Continue Losartan, atenolol and zocor. Low risk stress in 2019. He is also suggesting since next year will be 20 years since his CABG, he would like to have a stress test to \"make sure everything is ok\".    2. S/p CABG- 3v in 2003. Stable. Patent grafts per cath in 2013.  3. PAF- stable. NSR today. continue atenolol and eliquis [id number] 3. No known recurrence since his admission in 2018. Would not be unreasonable to obtain a holter monitor at next visit to assess for afib and possibly " stop anticoagulation   4. Anticoagulation- on appropriate dose of eliquis. Denies bleeding.   5. HTN- controlled. Continue current therapy. Followed by PCP   6. HLD- controlled with last LDL of 68. On zocor. I have discussed the importance of cholesterol control with a LDL goal of <70 as recommended by the AHA.          Follow up in 1 year or sooner if symptoms worsen.     I spent 30 minutes caring for Pawel on this date of service. This time includes time spent by me in the following activities:preparing for the visit, reviewing tests, obtaining and/or reviewing a separately obtained history, performing a medically appropriate examination and/or evaluation , counseling and educating the patient/family/caregiver, documenting information in the medical record, independently interpreting results and communicating that information with the patient/family/caregiver and care coordination

## 2021-09-19 ASSESSMENT — ENCOUNTER SYMPTOMS
BACK PAIN: 0
EYE DISCHARGE: 0
ABDOMINAL PAIN: 0
SHORTNESS OF BREATH: 0
COUGH: 0
SINUS PRESSURE: 0
ABDOMINAL DISTENTION: 0
EYE REDNESS: 0

## 2021-10-08 DIAGNOSIS — E78.00 PURE HYPERCHOLESTEROLEMIA: ICD-10-CM

## 2021-10-08 NOTE — TELEPHONE ENCOUNTER
Mariana Blas called to request a refill on his medication.       Last office visit : 9/7/2021   Next office visit : 9/8/2022    Requested Prescriptions     Pending Prescriptions Disp Refills    simvastatin (ZOCOR) 40 MG tablet [Pharmacy Med Name: SIMVASTATIN  TAB 40MG] 90 tablet 3     Sig: TAKE 1 TABLET AT BEDTIME            Madi Simon MA

## 2021-10-12 RX ORDER — SIMVASTATIN 40 MG
TABLET ORAL
Qty: 90 TABLET | Refills: 3 | Status: SHIPPED | OUTPATIENT
Start: 2021-10-12 | End: 2022-09-26

## 2021-11-01 DIAGNOSIS — N40.1 BENIGN PROSTATIC HYPERPLASIA WITH URINARY OBSTRUCTION: ICD-10-CM

## 2021-11-01 DIAGNOSIS — N13.8 BENIGN PROSTATIC HYPERPLASIA WITH URINARY OBSTRUCTION: ICD-10-CM

## 2021-11-01 RX ORDER — TAMSULOSIN HYDROCHLORIDE 0.4 MG/1
CAPSULE ORAL
Qty: 30 CAPSULE | Refills: 0 | Status: SHIPPED | OUTPATIENT
Start: 2021-11-01 | End: 2021-11-12 | Stop reason: SDUPTHER

## 2021-11-04 NOTE — PROGRESS NOTES
Subjective    Mr. Thompson is 66 y.o. male    Chief Complaint: BPH    History of Present Illness    66-year-old male established patient on Eliquis follow-up for enlarged prostate on finasteride and tamsulosin.  His LUTS are currently mostly controlled except for urgency and occasional urge leakage.  PSA this year is normal at 0.94.  He denies hematuria or dysuria.  He states he may be able to come off his Eliquis next year.  UA today is clear.    Lab Results   Component Value Date    PSA 0.947 11/03/2021    PSA 0.97 08/30/2021    PSA 1.060 11/04/2020       The following portions of the patient's history were reviewed and updated as appropriate: allergies, current medications, past family history, past medical history, past social history, past surgical history and problem list.    Review of Systems      Current Outpatient Medications:   •  atenolol (TENORMIN) 25 MG tablet, Take 25 mg by mouth Daily., Disp: , Rfl:   •  coenzyme Q10 100 MG capsule, Take 100 mg by mouth Daily., Disp: , Rfl:   •  Eliquis 5 MG tablet tablet, TAKE 1 TABLET BY MOUTH EVERY 12 HOURS., Disp: 60 tablet, Rfl: 11  •  finasteride (PROSCAR) 5 MG tablet, Take 1 tablet by mouth Daily., Disp: 90 tablet, Rfl: 3  •  losartan (COZAAR) 25 MG tablet, Take 25 mg by mouth Daily., Disp: , Rfl:   •  Omega-3 Fatty Acids (fish oil) 1000 MG capsule capsule, Take 1,000 mg by mouth 2 (two) times a day., Disp: , Rfl:   •  omeprazole (priLOSEC) 20 MG capsule, Take 20 mg by mouth Daily., Disp: , Rfl:   •  PATIENT SUPPLIED MEDICATION, Indications: 4 life cardio, Disp: , Rfl:   •  simvastatin (ZOCOR) 40 MG tablet, Take 40 mg by mouth Every Night., Disp: , Rfl:   •  tamsulosin (FLOMAX) 0.4 MG capsule 24 hr capsule, Take 1 capsule by mouth Every Night., Disp: 90 capsule, Rfl: 3    Past Medical History:   Diagnosis Date   • Benign prostatic hyperplasia with lower urinary tract symptoms 12/6/2017   • Coronary artery disease    • COVID-19    • Elevated PSA 12/6/2016   •  "Hyperlipidemia    • Hypertension    • New onset atrial fibrillation (HCC) 8/31/2019       Past Surgical History:   Procedure Laterality Date   • APPENDECTOMY     • CARDIAC CATHETERIZATION     • CORONARY ARTERY BYPASS GRAFT         Social History     Socioeconomic History   • Marital status:    Tobacco Use   • Smoking status: Never Smoker   • Smokeless tobacco: Never Used   Vaping Use   • Vaping Use: Never used   Substance and Sexual Activity   • Alcohol use: Yes     Alcohol/week: 1.0 standard drink     Types: 1 Cans of beer per week     Comment: occasionally   • Drug use: No   • Sexual activity: Defer       Family History   Problem Relation Age of Onset   • Prostate cancer Father    • Heart disease Father    • Heart disease Mother    • Stroke Mother    • Heart attack Brother    • Heart disease Brother    • No Known Problems Maternal Grandmother    • No Known Problems Maternal Grandfather    • Heart disease Paternal Grandmother    • No Known Problems Paternal Grandfather    • No Known Problems Brother        Objective    Temp 97.1 °F (36.2 °C)   Ht 175.3 cm (69\")   Wt 101 kg (221 lb 12.8 oz)   BMI 32.75 kg/m²     Physical Exam  Penis and testicles normal.  Digital rectal exam reveals greater than 60 g prostate, no nodules.      Results for orders placed or performed in visit on 11/12/21   POC Urinalysis Dipstick, Multipro    Specimen: Urine   Result Value Ref Range    Color Yellow Yellow, Straw, Dark Yellow, [place]    Clarity, UA Clear Clear    Glucose, UA Negative Negative, 1000 mg/dL (3+) mg/dL    Bilirubin Negative Negative    Ketones, UA Negative Negative    Specific Gravity  1.010 1.005 - 1.030    Blood, UA Negative Negative    pH, Urine 6.5 5.0 - 8.0    Protein, POC Negative Negative mg/dL    Urobilinogen, UA Normal Normal    Nitrite, UA Negative Negative    Leukocytes Negative Negative     International Prostate Symptom Score  The following is posted based on patient questionnaire answers:  0 - not at " all    1-7 mild symptoms  1- Less than one time in five  8-19 moderate symptoms  2 -Less than half the time  20-35 severe symptoms  3 - About half the time  4 - More than half the time  5 - Almost always     For following sections:  Incomplete Emptying: - How often have you had the sensation  of not emptying your bladder completely after you finished urinating?  1  Frequency: -How often have you had to urinate again less than   two hours after you finished urinating?      4  Intermittency: -How often have you found you stopped and started again  Several times when you urinate?       0  Urgency: -How often do you find it difficult to postpone urination?             3  Weak stream: - How often have you had a weak urinary stream?             2  Straining: - How often have you had to push or strain to begin  Urination?          0  Sleeping: -How many times did you most typically get up to urinate   From the time you went to bed at night until the time you got up in the   2  Morning          Total `  12    Quality of Life  How would you feel if you had to live with your urinary condition the way   2  It is now, no better, no worse for the rest of your life?    Where: 0=delighted; 1= pleased, 2= mostly satisfied, 3= mixed, 4 = mostly  Dissatisfied, 5= Unhappy, 6 = terrible    Assessment and Plan    Diagnoses and all orders for this visit:    1. Benign prostatic hyperplasia with urinary obstruction (Primary)  -     POC Urinalysis Dipstick, Multipro  -     tamsulosin (FLOMAX) 0.4 MG capsule 24 hr capsule; Take 1 capsule by mouth Every Night.  Dispense: 90 capsule; Refill: 3    2. Urine frequency  -     PSA DIAGNOSTIC; Future      Normal PSA and exam with stable LUTS on combination therapy-continue tamsulosin and finasteride.  He may be a candidate for TURP in the future if he can come off his Eliquis.  He will follow-up in 1 year with preclinic PSA or sooner as needed.      This document has been signed by DASHAWN Velazquez  MD on November 12, 2021 17:51 CST

## 2021-11-12 ENCOUNTER — OFFICE VISIT (OUTPATIENT)
Dept: UROLOGY | Facility: CLINIC | Age: 66
End: 2021-11-12

## 2021-11-12 VITALS — WEIGHT: 221.8 LBS | HEIGHT: 69 IN | BODY MASS INDEX: 32.85 KG/M2 | TEMPERATURE: 97.1 F

## 2021-11-12 DIAGNOSIS — N40.1 BENIGN PROSTATIC HYPERPLASIA WITH URINARY OBSTRUCTION: Primary | ICD-10-CM

## 2021-11-12 DIAGNOSIS — N13.8 BENIGN PROSTATIC HYPERPLASIA WITH URINARY OBSTRUCTION: Primary | ICD-10-CM

## 2021-11-12 DIAGNOSIS — R35.0 URINE FREQUENCY: ICD-10-CM

## 2021-11-12 PROCEDURE — 99213 OFFICE O/P EST LOW 20 MIN: CPT | Performed by: UROLOGY

## 2021-11-12 PROCEDURE — 81001 URINALYSIS AUTO W/SCOPE: CPT | Performed by: UROLOGY

## 2021-11-12 RX ORDER — TAMSULOSIN HYDROCHLORIDE 0.4 MG/1
1 CAPSULE ORAL NIGHTLY
Qty: 90 CAPSULE | Refills: 3 | Status: SHIPPED | OUTPATIENT
Start: 2021-11-12 | End: 2021-12-01 | Stop reason: SDUPTHER

## 2021-11-12 NOTE — PATIENT INSTRUCTIONS
"BMI for Adults  What is BMI?  Body mass index (BMI) is a number that is calculated from a person's weight and height. BMI can help estimate how much of a person's weight is composed of fat. BMI does not measure body fat directly. Rather, it is an alternative to procedures that directly measure body fat, which can be difficult and expensive.  BMI can help identify people who may be at higher risk for certain medical problems.  What are BMI measurements used for?  BMI is used as a screening tool to identify possible weight problems. It helps determine whether a person is obese, overweight, a healthy weight, or underweight.  BMI is useful for:  · Identifying a weight problem that may be related to a medical condition or may increase the risk for medical problems.  · Promoting changes, such as changes in diet and exercise, to help reach a healthy weight. BMI screening can be repeated to see if these changes are working.  How is BMI calculated?  BMI involves measuring your weight in relation to your height. Both height and weight are measured, and the BMI is calculated from those numbers. This can be done either in English ([place] or metric measurements. Note that charts and online BMI calculators are available to help you find your BMI quickly and easily without having to do these calculations yourself.  To calculate your BMI in English ([place] measurements:    1. Measure your weight in pounds (lb).  2. Multiply the number of pounds by 703.  ? For example, for a person who weighs 180 lb, multiply that number by 703, which equals 126,540.  3. Measure your height in inches. Then multiply that number by itself to get a measurement called \"inches squared.\"  ? For example, for a person who is 70 inches tall, the \"inches squared\" measurement is 70 inches x 70 inches, which equals 4,900 inches squared.  4. Divide the total from step 2 (number of lb x 703) by the total from step 3 (inches squared): 126,540 ÷ 4,900 = 25.8. This is " "your BMI.    To calculate your BMI in metric measurements:  1. Measure your weight in kilograms (kg).  2. Measure your height in meters (m). Then multiply that number by itself to get a measurement called \"meters squared.\"  ? For example, for a person who is 1.75 m tall, the \"meters squared\" measurement is 1.75 m x 1.75 m, which is equal to 3.1 meters squared.  3. Divide the number of kilograms (your weight) by the meters squared number. In this example: 70 ÷ 3.1 = 22.6. This is your BMI.  What do the results mean?  BMI charts are used to identify whether you are underweight, normal weight, overweight, or obese. The following guidelines will be used:  · Underweight: BMI less than 18.5.  · Normal weight: BMI between 18.5 and 24.9.  · Overweight: BMI between 25 and 29.9.  · Obese: BMI of 30 or above.  Keep these notes in mind:  · Weight includes both fat and muscle, so someone with a muscular build, such as an athlete, may have a BMI that is higher than 24.9. In cases like these, BMI is not an accurate measure of body fat.  · To determine if excess body fat is the cause of a BMI of 25 or higher, further assessments may need to be done by a health care provider.  · BMI is usually interpreted in the same way for men and women.  Where to find more information  For more information about BMI, including tools to quickly calculate your BMI, go to these websites:  · Centers for Disease Control and Prevention: www.cdc.gov  · American Heart Association: www.heart.org  · National Heart, Lung, and Blood [place]: www.nhlbi.nih.gov  Summary  · Body mass index (BMI) is a number that is calculated from a person's weight and height.  · BMI may help estimate how much of a person's weight is composed of fat. BMI can help identify those who may be at higher risk for certain medical problems.  · BMI can be measured using English measurements or metric measurements.  · BMI charts are used to identify whether you are underweight, normal " weight, overweight, or obese.  This information is not intended to replace advice given to you by your health care provider. Make sure you discuss any questions you have with your health care provider.  Document Revised: 09/09/2020 Document Reviewed: 07/17/2020  [employer]vier Patient Education © 2021 [employer]

## 2021-12-01 DIAGNOSIS — N40.1 BENIGN PROSTATIC HYPERPLASIA WITH URINARY OBSTRUCTION: ICD-10-CM

## 2021-12-01 DIAGNOSIS — N13.8 BENIGN PROSTATIC HYPERPLASIA WITH URINARY OBSTRUCTION: ICD-10-CM

## 2021-12-01 DIAGNOSIS — N40.1 BENIGN PROSTATIC HYPERPLASIA WITH LOWER URINARY TRACT SYMPTOMS, SYMPTOM DETAILS UNSPECIFIED: ICD-10-CM

## 2021-12-01 RX ORDER — TAMSULOSIN HYDROCHLORIDE 0.4 MG/1
1 CAPSULE ORAL NIGHTLY
Qty: 90 CAPSULE | Refills: 3 | Status: SHIPPED | OUTPATIENT
Start: 2021-12-01 | End: 2022-01-17 | Stop reason: SDUPTHER

## 2021-12-01 RX ORDER — FINASTERIDE 5 MG/1
5 TABLET, FILM COATED ORAL DAILY
Qty: 90 TABLET | Refills: 3 | Status: SHIPPED | OUTPATIENT
Start: 2021-12-01 | End: 2022-01-17 | Stop reason: SDUPTHER

## 2022-01-17 DIAGNOSIS — N40.1 BENIGN PROSTATIC HYPERPLASIA WITH URINARY OBSTRUCTION: ICD-10-CM

## 2022-01-17 DIAGNOSIS — N13.8 BENIGN PROSTATIC HYPERPLASIA WITH URINARY OBSTRUCTION: ICD-10-CM

## 2022-01-17 DIAGNOSIS — N40.1 BENIGN PROSTATIC HYPERPLASIA WITH LOWER URINARY TRACT SYMPTOMS, SYMPTOM DETAILS UNSPECIFIED: ICD-10-CM

## 2022-01-17 RX ORDER — TAMSULOSIN HYDROCHLORIDE 0.4 MG/1
1 CAPSULE ORAL NIGHTLY
Qty: 90 CAPSULE | Refills: 3 | Status: SHIPPED | OUTPATIENT
Start: 2022-01-17 | End: 2022-09-26

## 2022-01-17 RX ORDER — FINASTERIDE 5 MG/1
5 TABLET, FILM COATED ORAL DAILY
Qty: 90 TABLET | Refills: 3 | Status: SHIPPED | OUTPATIENT
Start: 2022-01-17 | End: 2022-11-14 | Stop reason: SDUPTHER

## 2022-04-17 ENCOUNTER — HOSPITAL ENCOUNTER (EMERGENCY)
Age: 67
Discharge: HOME | End: 2022-04-17
Payer: MEDICARE

## 2022-04-17 ENCOUNTER — APPOINTMENT (OUTPATIENT)
Dept: GENERAL RADIOLOGY | Age: 67
End: 2022-04-17
Payer: MEDICARE

## 2022-04-17 VITALS
OXYGEN SATURATION: 96 % | WEIGHT: 214 LBS | DIASTOLIC BLOOD PRESSURE: 79 MMHG | HEIGHT: 69 IN | BODY MASS INDEX: 31.7 KG/M2 | RESPIRATION RATE: 18 BRPM | HEART RATE: 65 BPM | SYSTOLIC BLOOD PRESSURE: 116 MMHG

## 2022-04-17 DIAGNOSIS — M25.461 EFFUSION OF KNEE JOINT RIGHT: ICD-10-CM

## 2022-04-17 DIAGNOSIS — S83.91XA SPRAIN OF RIGHT KNEE, UNSPECIFIED LIGAMENT, INITIAL ENCOUNTER: Primary | ICD-10-CM

## 2022-04-17 PROCEDURE — 99283 EMERGENCY DEPT VISIT LOW MDM: CPT

## 2022-04-17 PROCEDURE — 6370000000 HC RX 637 (ALT 250 FOR IP): Performed by: NURSE PRACTITIONER

## 2022-04-17 PROCEDURE — 73560 X-RAY EXAM OF KNEE 1 OR 2: CPT

## 2022-04-17 RX ORDER — HYDROCODONE BITARTRATE AND ACETAMINOPHEN 10; 325 MG/1; MG/1
1 TABLET ORAL ONCE
Status: COMPLETED | OUTPATIENT
Start: 2022-04-17 | End: 2022-04-17

## 2022-04-17 RX ADMIN — HYDROCODONE BITARTRATE AND ACETAMINOPHEN 1 TABLET: 10; 325 TABLET ORAL at 17:44

## 2022-04-17 ASSESSMENT — PAIN DESCRIPTION - ORIENTATION: ORIENTATION: RIGHT

## 2022-04-17 ASSESSMENT — PAIN - FUNCTIONAL ASSESSMENT: PAIN_FUNCTIONAL_ASSESSMENT: 0-10

## 2022-04-17 ASSESSMENT — PAIN DESCRIPTION - PAIN TYPE: TYPE: ACUTE PAIN

## 2022-04-17 ASSESSMENT — PAIN SCALES - GENERAL
PAINLEVEL_OUTOF10: 5
PAINLEVEL_OUTOF10: 6

## 2022-04-17 ASSESSMENT — PAIN DESCRIPTION - FREQUENCY: FREQUENCY: CONTINUOUS

## 2022-04-17 ASSESSMENT — PAIN DESCRIPTION - LOCATION: LOCATION: KNEE

## 2022-04-17 NOTE — ED PROVIDER NOTES
[facility]  eMERGENCY dEPARTMENT eNCOUnter      Pt Name: Dru Hampton  MRN: [id number]  Andergfgerald 1955  Date of evaluation: 4/17/2022  Provider: Juan José Sanchez, [address]       Chief Complaint   Patient presents with    Knee Pain     right knee injury after fall, heard pop         HISTORY OF PRESENT ILLNESS   (Location/Symptom, Timing/Onset,Context/Setting, Quality, Duration, Modifying Factors, Severity)  Note limiting factors. Dru Hampton is a 74 y.o. male who presents to the emergency department after a fall down 2 steps onto his r knee. Felt a pop. This was his good knee     The history is provided by the patient. Knee Problem  Location:  Knee  Time since incident:  3 hours  Injury: yes    Knee location:  R knee  Pain details:     Quality:  Aching    Radiates to:  R leg    Severity:  Moderate    Onset quality:  Sudden    Duration:  3 hours    Timing:  Constant    Progression:  Unchanged  Chronicity:  New  Associated symptoms: decreased ROM and swelling    Associated symptoms: no fever        NursingNotes were reviewed. REVIEW OF SYSTEMS    (2-9 systems for level 4, 10 or more for level 5)     Review of Systems   Constitutional: Negative for fever. Musculoskeletal: Positive for arthralgias, joint swelling and myalgias. Except as noted above the remainder of the review of systems was reviewed and negative.        PAST MEDICAL HISTORY     Past Medical History:   Diagnosis Date    Atrial fibrillation ([address]     Coronary artery disease     Essential hypertension     GERD (gastroesophageal reflux disease)     Hyperlipidemia     MICHELLE (obstructive sleep apnea) 1/28/2020         SURGICALHISTORY       Past Surgical History:   Procedure Laterality Date    APPENDECTOMY      CORONARY ARTERY BYPASS GRAFT      PROSTATE SURGERY           CURRENT MEDICATIONS       Discharge Medication List as of 4/17/2022  5:56 PM      CONTINUE these medications which have NOT CHANGED    Details simvastatin (ZOCOR) 40 MG tablet TAKE 1 TABLET AT BEDTIME, Disp-90 tablet, R-3Normal      atenolol (TENORMIN) 25 MG tablet TAKE 1 TABLET ONCE DAILY, Disp-90 tablet, R-3Normal      losartan (COZAAR) 25 MG tablet TAKE 1 TABLET DAILY AS     DIRECTED, Disp-90 tablet, R-3Normal      apixaban (ELIQUIS) 5 MG TABS tablet Take 5 mg by mouth 2 times dailyHistorical Med      UNABLE TO FIND Indications: 4 life transger factor plus Historical Med      NONFORMULARY Indications: 4 life cardio Historical Med      tamsulosin (FLOMAX) 0.4 MG capsule TAKE 1 CAPSULE BY MOUTH ONCE DAILY, Disp-90 capsule, R-3Normal      Krill Oil 1000 MG CAPS Take 1 capsule by mouth dailyHistorical Med      omeprazole (PRILOSEC) 20 MG delayed release capsule Take 20 mg by mouth dailyHistorical Med      finasteride (PROSCAR) 5 MG tablet TAKE 1 TABLET BY MOUTH ONCE DAILY, Disp-90 tablet, R-3Normal             ALLERGIES     Ramipril    FAMILY HISTORY       Family History   Problem Relation Age of Onset    Prostate Cancer Father     Coronary Art Dis Other     High Cholesterol Other           SOCIAL HISTORY       Social History     Socioeconomic History    Marital status:      Spouse name: None    Number of children: None    Years of education: None    Highest education level: None   Occupational History    None   Tobacco Use    Smoking status: Never Smoker    Smokeless tobacco: Never Used   Vaping Use    Vaping Use: Never used   Substance and Sexual Activity    Alcohol use: Yes     Comment: rare    Drug use: Never    Sexual activity: None   Other Topics Concern    None   Social History Narrative    None     Social Determinants of Health     Financial Resource Strain: Low Risk     Difficulty of Paying Living Expenses: Not hard at all   Food Insecurity: No Food Insecurity    Worried About Running Out of Food in the Last Year: Never true    Reji of Food in the Last Year: Never true   Transportation Needs:     Lack of Transportation (Medical): Not on file    Lack of Transportation (Non-Medical): Not on file   Physical Activity:     Days of Exercise per Week: Not on file    Minutes of Exercise per Session: Not on file   Stress:     Feeling of Stress : Not on file   Social Connections:     Frequency of Communication with Friends and Family: Not on file    Frequency of Social Gatherings with Friends and Family: Not on file    Attends Sikhism Services: Not on file    Active Member of [address] [place] [address] or Organizations: Not on file    Attends Club or Organization Meetings: Not on file    Marital Status: Not on file   Intimate Partner Violence:     Fear of Current or Ex-Partner: Not on file    Emotionally Abused: Not on file    Physically Abused: Not on file    Sexually Abused: Not on file   Housing Stability:     Unable to Pay for Housing in the Last Year: Not on file    Number of [place] in the Last Year: Not on file    Unstable Housing in the Last Year: Not on file       SCREENINGS    Rick Coma Scale  Eye Opening: Spontaneous  Best Verbal Response: Oriented  Best Motor Response: Obeys commands  [place] Coma Scale Score: 15 @FLOW([id number])@      PHYSICAL EXAM    (up to 7 for level 4, 8 or more for level 5)     ED Triage Vitals [04/17/22 1556]   BP Temp Temp src Pulse Resp SpO2 Height Weight   116/79 -- -- 65 18 96 % 5' 9\" (1.753 m) 214 lb (97.1 kg)       Physical Exam  Vitals and nursing note reviewed. Constitutional:       Appearance: He is well-developed. HENT:      Head: Normocephalic and atraumatic. Eyes:      General: No scleral icterus. Right eye: No discharge. Left eye: No discharge. Cardiovascular:      Rate and Rhythm: Normal rate and regular rhythm. Heart sounds: Normal heart sounds. Pulmonary:      Effort: No respiratory distress. Breath sounds: Normal breath sounds. Musculoskeletal:      Cervical back: Normal range of motion and neck supple.       Left knee: Swelling present. No deformity, erythema, ecchymosis or lacerations. Decreased range of motion. Tenderness present. Legs:    Neurological:      Mental Status: He is alert and oriented to person, place, and time. Psychiatric:         Behavior: Behavior normal.         DIAGNOSTIC RESULTS     EKG: All EKG's are interpreted by the Emergency Department Physician who either signs or Co-signsthis chart in the absence of a cardiologist.        RADIOLOGY:   [place] such as CT, Ultrasound and MRI are read by the radiologist. Plain radiographic images are visualized and preliminarily interpreted by the emergency physician with the below findings:      Interpretation per the Radiologist below, if available at the time of this note:    XR KNEE RIGHT (1-2 VIEWS)   Final Result   1. Mild arthritic change patellofemoral joint with questionable small   knee joint effusion. No acute osseous injury. Signed by Dr Glenna Garcia            ED BEDSIDEULTRASOUND:   Performed by ED Physician -none    LABS:  Labs Reviewed - No data to display    All other labs were within normal range or not returned as of this dictation. EMERGENCY DEPARTMENT COURSE and DIFFERENTIALDIAGNOSIS/MDM:   Vitals:    Vitals:    04/17/22 1556   BP: 116/79   Pulse: 65   Resp: 18   SpO2: 96%   Weight: 214 lb (97.1 kg)   Height: 5' 9\" (1.753 m)           MDM ace and immobilizer provided. Pt given a script for a walker as he does not feel he can use crutches,  To follow with ortho      CONSULTS:  None    PROCEDURES:  Unless otherwise noted below, none     Procedures    FINAL IMPRESSION      1. Sprain of right knee, unspecified ligament, initial encounter    2.  Effusion of knee joint right        DISPOSITION/PLAN   DISPOSITION Decision To Discharge 04/17/2022 05:13:06 PM      PATIENT REFERRED TO:  Kacey Skelton MD  [facility] [address]  [phone]    Schedule an appointment as soon as possible for a visit         DISCHARGE MEDICATIONS:  Discharge Medication List as of 4/17/2022  5:56 PM             (Please note that portions of this note were completed with a voice recognitionprogram.  Efforts were made to edit the dictations but occasionally words are mis-transcribed.)    LANIE Bartlett (electronically signed)          LANIE Bartlett  04/18/22 1315

## 2022-04-17 NOTE — ED NOTES
Right knee wrapped with ACE bandage and knee immobilizer placed.       Ghanshyam Arnold RN  04/17/22 0996

## 2022-06-20 DIAGNOSIS — I10 ESSENTIAL HYPERTENSION: ICD-10-CM

## 2022-06-20 RX ORDER — ATENOLOL 25 MG/1
TABLET ORAL
Qty: 90 TABLET | Refills: 3 | Status: SHIPPED | OUTPATIENT
Start: 2022-06-20

## 2022-06-29 DIAGNOSIS — I10 ESSENTIAL HYPERTENSION: ICD-10-CM

## 2022-06-29 NOTE — TELEPHONE ENCOUNTER
Jessica Salinas called requesting a refill of the below medication which has been pended for you:     Requested Prescriptions     Pending Prescriptions Disp Refills    losartan (COZAAR) 25 MG tablet [Pharmacy Med Name: Myesha Araujo TAB 25MG] 90 tablet 0     Sig: TAKE 1 TABLET DAILY AS     DIRECTED       Last Appointment Date: 9/7/2021  Next Appointment Date: 9/8/2022    Allergies   Allergen Reactions    Ramipril      Lips swell, hives

## 2022-06-30 RX ORDER — LOSARTAN POTASSIUM 25 MG/1
TABLET ORAL
Qty: 90 TABLET | Refills: 0 | Status: SHIPPED | OUTPATIENT
Start: 2022-06-30 | End: 2022-09-26

## 2022-08-24 DIAGNOSIS — E78.00 PURE HYPERCHOLESTEROLEMIA: ICD-10-CM

## 2022-08-24 LAB
ALBUMIN SERPL-MCNC: 4.4 G/DL (ref 3.5–5.2)
ALP BLD-CCNC: 40 U/L (ref 40–130)
ALT SERPL-CCNC: 23 U/L (ref 5–41)
ANION GAP SERPL CALCULATED.3IONS-SCNC: 10 MMOL/L (ref 7–19)
AST SERPL-CCNC: 18 U/L (ref 5–40)
BILIRUB SERPL-MCNC: 0.5 MG/DL (ref 0.2–1.2)
BUN BLDV-MCNC: 13 MG/DL (ref 8–23)
CALCIUM SERPL-MCNC: 9.5 MG/DL (ref 8.8–10.2)
CHLORIDE BLD-SCNC: 102 MMOL/L (ref 98–111)
CHOLESTEROL, TOTAL: 147 MG/DL (ref 160–199)
CO2: 27 MMOL/L (ref 22–29)
CREAT SERPL-MCNC: 1.1 MG/DL (ref 0.5–1.2)
GFR AFRICAN AMERICAN: >59
GFR NON-AFRICAN AMERICAN: >60
GLUCOSE BLD-MCNC: 109 MG/DL (ref 74–109)
HCT VFR BLD CALC: 42.3 % (ref 42–52)
HDLC SERPL-MCNC: 68 MG/DL (ref 55–121)
HEMOGLOBIN: 14.2 G/DL (ref 14–18)
LDL CHOLESTEROL CALCULATED: 62 MG/DL
MCH RBC QN AUTO: 29.6 PG (ref 27–31)
MCHC RBC AUTO-ENTMCNC: 33.6 G/DL (ref 33–37)
MCV RBC AUTO: 88.3 FL (ref 80–94)
PDW BLD-RTO: 13.3 % (ref 11.5–14.5)
PLATELET # BLD: 201 K/UL (ref 130–400)
PMV BLD AUTO: 11.6 FL (ref 9.4–12.4)
POTASSIUM SERPL-SCNC: 4.7 MMOL/L (ref 3.5–5)
RBC # BLD: 4.79 M/UL (ref 4.7–6.1)
SODIUM BLD-SCNC: 139 MMOL/L (ref 136–145)
TOTAL PROTEIN: 6.6 G/DL (ref 6.6–8.7)
TRIGL SERPL-MCNC: 87 MG/DL (ref 0–149)
TSH SERPL DL<=0.05 MIU/L-ACNC: 3.29 UIU/ML (ref 0.27–4.2)
WBC # BLD: 6.6 K/UL (ref 4.8–10.8)

## 2022-09-06 SDOH — HEALTH STABILITY: PHYSICAL HEALTH: ON AVERAGE, HOW MANY DAYS PER WEEK DO YOU ENGAGE IN MODERATE TO STRENUOUS EXERCISE (LIKE A BRISK WALK)?: 5 DAYS

## 2022-09-06 SDOH — HEALTH STABILITY: PHYSICAL HEALTH: ON AVERAGE, HOW MANY MINUTES DO YOU ENGAGE IN EXERCISE AT THIS LEVEL?: 40 MIN

## 2022-09-06 ASSESSMENT — LIFESTYLE VARIABLES
HOW OFTEN DO YOU HAVE SIX OR MORE DRINKS ON ONE OCCASION: 1
HOW MANY STANDARD DRINKS CONTAINING ALCOHOL DO YOU HAVE ON A TYPICAL DAY: 1 OR 2
HOW MANY STANDARD DRINKS CONTAINING ALCOHOL DO YOU HAVE ON A TYPICAL DAY: 1
HOW OFTEN DO YOU HAVE A DRINK CONTAINING ALCOHOL: 3
HOW OFTEN DO YOU HAVE A DRINK CONTAINING ALCOHOL: 2-4 TIMES A MONTH

## 2022-09-06 ASSESSMENT — PATIENT HEALTH QUESTIONNAIRE - PHQ9
SUM OF ALL RESPONSES TO PHQ9 QUESTIONS 1 & 2: 0
2. FEELING DOWN, DEPRESSED OR HOPELESS: 0
SUM OF ALL RESPONSES TO PHQ QUESTIONS 1-9: 0
1. LITTLE INTEREST OR PLEASURE IN DOING THINGS: 0
SUM OF ALL RESPONSES TO PHQ QUESTIONS 1-9: 0

## 2022-09-08 ENCOUNTER — OFFICE VISIT (OUTPATIENT)
Dept: INTERNAL MEDICINE | Age: 67
End: 2022-09-08
Payer: MEDICARE

## 2022-09-08 ENCOUNTER — TRANSCRIBE ORDERS (OUTPATIENT)
Dept: ADMINISTRATIVE | Facility: HOSPITAL | Age: 67
End: 2022-09-08

## 2022-09-08 VITALS
OXYGEN SATURATION: 97 % | DIASTOLIC BLOOD PRESSURE: 60 MMHG | HEART RATE: 54 BPM | BODY MASS INDEX: 32.58 KG/M2 | HEIGHT: 69 IN | SYSTOLIC BLOOD PRESSURE: 110 MMHG | WEIGHT: 220 LBS

## 2022-09-08 DIAGNOSIS — I48.0 PAROXYSMAL ATRIAL FIBRILLATION (HCC): ICD-10-CM

## 2022-09-08 DIAGNOSIS — E78.00 PURE HYPERCHOLESTEROLEMIA: ICD-10-CM

## 2022-09-08 DIAGNOSIS — R73.9 HYPERGLYCEMIA: ICD-10-CM

## 2022-09-08 DIAGNOSIS — Z23 NEED FOR PROPHYLACTIC VACCINATION AND INOCULATION AGAINST VARICELLA: ICD-10-CM

## 2022-09-08 DIAGNOSIS — I48.0 PAROXYSMAL ATRIAL FIBRILLATION: Primary | ICD-10-CM

## 2022-09-08 DIAGNOSIS — Z00.00 MEDICARE ANNUAL WELLNESS VISIT, SUBSEQUENT: Primary | ICD-10-CM

## 2022-09-08 DIAGNOSIS — I10 ESSENTIAL HYPERTENSION: ICD-10-CM

## 2022-09-08 DIAGNOSIS — Z12.11 SCREEN FOR COLON CANCER: ICD-10-CM

## 2022-09-08 DIAGNOSIS — R97.20 ELEVATED PSA: ICD-10-CM

## 2022-09-08 DIAGNOSIS — I25.10 CORONARY ARTERY DISEASE INVOLVING NATIVE CORONARY ARTERY OF NATIVE HEART WITHOUT ANGINA PECTORIS: ICD-10-CM

## 2022-09-08 DIAGNOSIS — Z12.5 ENCOUNTER FOR SCREENING FOR MALIGNANT NEOPLASM OF PROSTATE: ICD-10-CM

## 2022-09-08 LAB — HBA1C MFR BLD: 5.7 %

## 2022-09-08 PROCEDURE — G0439 PPPS, SUBSEQ VISIT: HCPCS | Performed by: INTERNAL MEDICINE

## 2022-09-08 PROCEDURE — G8427 DOCREV CUR MEDS BY ELIG CLIN: HCPCS | Performed by: INTERNAL MEDICINE

## 2022-09-08 PROCEDURE — 1123F ACP DISCUSS/DSCN MKR DOCD: CPT | Performed by: INTERNAL MEDICINE

## 2022-09-08 PROCEDURE — 3017F COLORECTAL CA SCREEN DOC REV: CPT | Performed by: INTERNAL MEDICINE

## 2022-09-08 PROCEDURE — 83036 HEMOGLOBIN GLYCOSYLATED A1C: CPT | Performed by: INTERNAL MEDICINE

## 2022-09-08 PROCEDURE — G8417 CALC BMI ABV UP PARAM F/U: HCPCS | Performed by: INTERNAL MEDICINE

## 2022-09-08 PROCEDURE — 1036F TOBACCO NON-USER: CPT | Performed by: INTERNAL MEDICINE

## 2022-09-08 PROCEDURE — 99213 OFFICE O/P EST LOW 20 MIN: CPT | Performed by: INTERNAL MEDICINE

## 2022-09-08 SDOH — ECONOMIC STABILITY: FOOD INSECURITY: WITHIN THE PAST 12 MONTHS, THE FOOD YOU BOUGHT JUST DIDN'T LAST AND YOU DIDN'T HAVE MONEY TO GET MORE.: NEVER TRUE

## 2022-09-08 SDOH — ECONOMIC STABILITY: FOOD INSECURITY: WITHIN THE PAST 12 MONTHS, YOU WORRIED THAT YOUR FOOD WOULD RUN OUT BEFORE YOU GOT MONEY TO BUY MORE.: NEVER TRUE

## 2022-09-08 ASSESSMENT — ENCOUNTER SYMPTOMS
SHORTNESS OF BREATH: 0
COUGH: 0
ABDOMINAL DISTENTION: 0
ABDOMINAL PAIN: 0
BACK PAIN: 0
SINUS PRESSURE: 0
EYE DISCHARGE: 0
EYE REDNESS: 0

## 2022-09-08 ASSESSMENT — SOCIAL DETERMINANTS OF HEALTH (SDOH): HOW HARD IS IT FOR YOU TO PAY FOR THE VERY BASICS LIKE FOOD, HOUSING, MEDICAL CARE, AND HEATING?: NOT HARD AT ALL

## 2022-09-08 NOTE — PROGRESS NOTES
Chief Complaint   Patient presents with    Medicare AWV         HPI: Patient is here today for Medicare annual wellness visit and to follow-up coronary artery disease hypertension hyperlipidemia and other medical issues. Overall he feels well he denies headache chest pressure chest pain dyspnea or abdominal pain.     Past Medical History:   Diagnosis Date    Atrial fibrillation ([address]     Coronary artery disease     Essential hypertension     GERD (gastroesophageal reflux disease)     Hyperlipidemia     MICHELLE (obstructive sleep apnea) 1/28/2020       Past Surgical History:   Procedure Laterality Date    APPENDECTOMY      CORONARY ARTERY BYPASS GRAFT      PROSTATE SURGERY         Family History   Problem Relation Age of Onset    Prostate Cancer Father     Coronary Art Dis Other     High Cholesterol Other        Social History     Socioeconomic History    Marital status:      Spouse name: Not on file    Number of children: Not on file    Years of education: Not on file    Highest education level: Not on file   Occupational History    Not on file   Tobacco Use    Smoking status: Never    Smokeless tobacco: Never   Vaping Use    Vaping Use: Never used   Substance and Sexual Activity    Alcohol use: Yes     Comment: rare    Drug use: Never    Sexual activity: Not on file   Other Topics Concern    Not on file   Social History Narrative    Not on file     Social Determinants of Health     Financial Resource Strain: Low Risk     Difficulty of Paying Living Expenses: Not hard at all   Food Insecurity: No Food Insecurity    Worried About Running Out of Food in the Last Year: Never true    Ran Out of Food in the Last Year: Never true   Transportation Needs: Not on file   Physical Activity: Sufficiently Active    Days of Exercise per Week: 5 days    Minutes of Exercise per Session: 40 min   Stress: Not on file   Social Connections: Not on file   Intimate Partner Violence: Not on file   Housing Stability: Not on file Allergies   Allergen Reactions    Ramipril      Lips swell, hives       Current Outpatient Medications   Medication Sig Dispense Refill    losartan (COZAAR) 25 MG tablet TAKE 1 TABLET DAILY AS     DIRECTED 90 tablet 0    atenolol (TENORMIN) 25 MG tablet TAKE 1 TABLET ONCE DAILY 90 tablet 3    simvastatin (ZOCOR) 40 MG tablet TAKE 1 TABLET AT BEDTIME 90 tablet 3    apixaban (ELIQUIS) 5 MG TABS tablet Take 5 mg by mouth 2 times daily      UNABLE TO FIND Indications: 4 life transger factor plus       NONFORMULARY Indications: 4 life cardio       tamsulosin (FLOMAX) 0.4 MG capsule TAKE 1 CAPSULE BY MOUTH ONCE DAILY 90 capsule 3    Krill Oil 1000 MG CAPS Take 1 capsule by mouth daily      omeprazole (PRILOSEC) 20 MG delayed release capsule Take 20 mg by mouth daily      finasteride (PROSCAR) 5 MG tablet TAKE 1 TABLET BY MOUTH ONCE DAILY 90 tablet 3     No current facility-administered medications for this visit. Review of Systems   Constitutional:  Negative for chills, fatigue and fever. HENT:  Negative for congestion and sinus pressure. Eyes:  Negative for discharge and redness. Respiratory:  Negative for cough and shortness of breath. Cardiovascular:  Negative for chest pain, palpitations and leg swelling. Gastrointestinal:  Negative for abdominal distention and abdominal pain. Genitourinary:  Negative for dysuria, frequency and urgency. Musculoskeletal:  Positive for arthralgias. Negative for back pain. Skin:  Negative for rash and wound. Neurological:  Negative for dizziness, light-headedness and headaches. Psychiatric/Behavioral:  Negative for dysphoric mood and sleep disturbance. The patient is not nervous/anxious.       /60   Pulse 54   Ht 5' 9\" (1.753 m)   Wt 220 lb (99.8 kg)   SpO2 97%   BMI 32.49 kg/m²   BP Readings from Last 7 Encounters:   09/08/22 110/60   04/17/22 116/79   09/07/21 110/70   09/08/20 112/64   01/28/20 (!) 110/58   09/06/19 112/62   08/13/18 120/66 Wt Readings from Last 7 Encounters:   09/08/22 220 lb (99.8 kg)   04/17/22 214 lb (97.1 kg)   09/07/21 218 lb 3.2 oz (99 kg)   09/08/20 210 lb (95.3 kg)   01/28/20 223 lb (101.2 kg)   09/06/19 225 lb 12.8 oz (102.4 kg)   08/13/18 226 lb (102.5 kg)     BMI Readings from Last 7 Encounters:   09/08/22 32.49 kg/m²   04/17/22 31.60 kg/m²   09/07/21 32.22 kg/m²   09/08/20 31.01 kg/m²   01/28/20 32.00 kg/m²   09/06/19 32.40 kg/m²   08/13/18 32.43 kg/m²     Resp Readings from Last 7 Encounters:   04/17/22 18   09/07/21 16       Physical Exam  Constitutional:       General: He is not in acute distress. Appearance: Normal appearance. He is well-developed. HENT:      Head: Normocephalic. Right Ear: External ear normal. Tympanic membrane is not injected. Left Ear: External ear normal. Tympanic membrane is not injected. Mouth/Throat:      Pharynx: No oropharyngeal exudate. Eyes:      General: No scleral icterus. Conjunctiva/sclera: Conjunctivae normal.   Neck:      Thyroid: No thyroid mass or thyromegaly. Vascular: No carotid bruit. Cardiovascular:      Rate and Rhythm: Normal rate and regular rhythm. Heart sounds: S1 normal and S2 normal. No murmur heard. No S3 or S4 sounds. Pulmonary:      Effort: Pulmonary effort is normal. No respiratory distress. Breath sounds: Normal breath sounds. No wheezing or rales. Abdominal:      General: Bowel sounds are normal. There is no distension. Palpations: Abdomen is soft. There is no mass. Tenderness: There is no abdominal tenderness. Musculoskeletal:      Cervical back: Neck supple. Lymphadenopathy:      Cervical: No cervical adenopathy. Upper Body:      Right upper body: No supraclavicular adenopathy. Left upper body: No supraclavicular adenopathy. Skin:     Findings: No rash. Neurological:      Mental Status: He is alert and oriented to person, place, and time.       Cranial Nerves: No cranial nerve deficit. Psychiatric:         Mood and Affect: Mood normal.       Results for orders placed or performed in visit on 09/08/22   POCT glycosylated hemoglobin (Hb A1C)   Result Value Ref Range    Hemoglobin A1C 5.7 %       ASSESSMENT/ PLAN:  1. Medicare annual wellness visit, subsequent  Chart, medications, labs, vaccines reviewed. Keep up to date with routine care and follow up. Call with any problems or complaints. Keep up to date with routine screening recomendations and vaccines. 2. Essential hypertension  Patient has excellent blood pressure control continue current medical care  - CBC; Future  - Comprehensive Metabolic Panel; Future    3. Coronary artery disease involving native coronary artery of native heart without angina pectoris  He also follows with cardiology continue beta-blocker high-dose statin therapy and close monitoring    4. Screen for colon cancer    - External Referral To Gastroenterology    5. Paroxysmal atrial fibrillation (HCC)  We are going to place a Zio patch history of paroxysmal atrial fib reassess the status of this and medications  - Longterm Continuous Cardiac Event Monitor (ZIO); Future    6. Need for prophylactic vaccination and inoculation against varicella    - zoster recombinant adjuvanted vaccine [facility]) 50 MCG/0.5ML SUSR injection; Inject 0.5 mLs into the muscle once for 1 dose  Dispense: 0.5 mL; Refill: 1    7. Hyperglycemia    - POCT glycosylated hemoglobin (Hb A1C)  - Hemoglobin A1C; Future    8. Pure hypercholesterolemia  High-dose statin therapy  - TSH; Future  - Lipid Panel; Future    9. Elevated PSA    - PSA Screening; Future    10. Encounter for screening for malignant neoplasm of prostate     - PSA Screening; Future                    Medicare Annual Wellness Visit    Stefanie Vaughn is here for Medicare AWV    Assessment & Plan   Medicare annual wellness visit, subsequent  Essential hypertension  -     CBC;  Future  -     Comprehensive Metabolic Panel; Future  Coronary artery disease involving native coronary artery of native heart without angina pectoris  Screen for colon cancer  -     External Referral To Gastroenterology  Paroxysmal atrial fibrillation (HCC)  -     Longterm Continuous Cardiac Event Monitor (ZIO); Future  Need for prophylactic vaccination and inoculation against varicella  -     zoster recombinant adjuvanted vaccine [facility]) 50 MCG/0.5ML SUSR injection; Inject 0.5 mLs into the muscle once for 1 dose, Disp-0.5 mL, R-1Print  Hyperglycemia  -     POCT glycosylated hemoglobin (Hb A1C)  -     Hemoglobin A1C; Future  Pure hypercholesterolemia  -     TSH; Future  -     Lipid Panel; Future  Elevated PSA  -     PSA Screening; Future  Encounter for screening for malignant neoplasm of prostate   -     PSA Screening; Future    Recommendations for Preventive Services Due: see orders and patient instructions/AVS.  Recommended screening schedule for the next 5-10 years is provided to the patient in written form: see Patient Instructions/AVS.     Return in 1 year (on 9/8/2023) for Medicare Annual Wellness Visit in 1 year. Subjective   The following acute and/or chronic problems were also addressed today:      Patient's complete Health Risk Assessment and screening values have been reviewed and are found in Flowsheets. The following problems were reviewed today and where indicated follow up appointments were made and/or referrals ordered.     Positive Risk Factor Screenings with Interventions:    Fall Risk:  Do you feel unsteady or are you worried about falling? : no  2 or more falls in past year?: no  Fall with injury in past year?: (!) yes   Fall Risk Interventions:    Remain active             General Health and ACP:  General  In general, how would you say your health is?: Good  In the past 7 days, have you experienced any of the following: New or Increased Pain, New or Increased Fatigue, Loneliness, Social Isolation, Stress or Anger?: No  Do you get the social and emotional support that you need?: Yes  Do you have a Living Will?: Yes    Advance Directives       Power of [address] Will ACP-Advance Directive ACP-Power of     Not on File Not on File Not on File Not on File          General Health Risk Interventions:       Health Habits/Nutrition:  Physical Activity: Sufficiently Active    Days of Exercise per Week: 5 days    Minutes of Exercise per Session: 40 min     Have you lost any weight without trying in the past 3 months?: No  Body mass index: (!) 32.48  Have you seen the dentist within the past year?: (!) No  Health Habits/Nutrition Interventions:  Low carb healthy diet              Objective   Vitals:    09/08/22 0840   BP: 110/60   Pulse: 54   SpO2: 97%   Weight: 220 lb (99.8 kg)   Height: 5' 9\" (1.753 m)      Body mass index is 32.49 kg/m². Allergies   Allergen Reactions    Ramipril      Lips swell, hives     Prior to Visit Medications    Medication Sig Taking?  Authorizing Provider   losartan (COZAAR) 25 MG tablet TAKE 1 TABLET DAILY AS     DIRECTED  Prudence Barron MD   atenolol (TENORMIN) 25 MG tablet TAKE 1 TABLET ONCE DAILY  Prudence Barron MD   simvastatin (ZOCOR) 40 MG tablet TAKE 1 TABLET AT BEDTIME  Prudence Barron MD   apixaban (ELIQUIS) 5 MG TABS tablet Take 5 mg by mouth 2 times daily  Historical Provider, MD   UNABLE TO FIND Indications: 4 life transger factor plus   Historical Provider, MD   NONFORMULARY Indications: 4 life cardio   Historical Provider, MD   tamsulosin (FLOMAX) 0.4 MG capsule TAKE 1 CAPSULE BY MOUTH ONCE DAILY  Prudence Barron MD   Krill Oil 1000 MG CAPS Take 1 capsule by mouth daily  Historical Provider, MD   omeprazole (PRILOSEC) 20 MG delayed release capsule Take 20 mg by mouth daily  Historical Provider, MD   finasteride (PROSCAR) 5 MG tablet TAKE 1 TABLET BY MOUTH ONCE DAILY  Prudence Barron MD       [facility] (Including outside providers/suppliers regularly involved in providing care):   Patient Care Team:  Dara Luna MD as PCP - General (Internal Medicine)  Dara Luna MD as PCP - REHABILITATION [facility] Empaneled Provider     Reviewed and updated this visit:  Allergies  Meds  Problems

## 2022-09-08 NOTE — PATIENT INSTRUCTIONS
Personalized Preventive Plan for Adina Reynoso - 9/8/2022  Medicare offers a range of preventive health benefits. Some of the tests and screenings are paid in full while other may be subject to a deductible, co-insurance, and/or copay. Some of these benefits include a comprehensive review of your medical history including lifestyle, illnesses that may run in your family, and various assessments and screenings as appropriate. After reviewing your medical record and screening and assessments performed today your provider may have ordered immunizations, labs, imaging, and/or referrals for you. A list of these orders (if applicable) as well as your Preventive Care list are included within your After Visit Summary for your review. Other Preventive Recommendations:    A preventive eye exam performed by an eye specialist is recommended every 1-2 years to screen for glaucoma; cataracts, macular degeneration, and other eye disorders. A preventive dental visit is recommended every 6 months. Try to get at least 150 minutes of exercise per week or 10,000 steps per day on a pedometer . Order or download the FREE \"Exercise & Physical Activity: Your Everyday Guide\" from [employer]. Call 1-141.328.6723 or search [employer] online. You need 4815-3984 mg of calcium and 6028-8270 IU of vitamin D per day. It is possible to meet your calcium requirement with diet alone, but a vitamin D supplement is usually necessary to meet this goal.  When exposed to the sun, use a sunscreen that protects against both UVA and UVB radiation with an SPF of 30 or greater. Reapply every 2 to 3 hours or after sweating, drying off with a towel, or swimming. Always wear a seat belt when traveling in a car. Always wear a helmet when riding a bicycle or motorcycle.

## 2022-09-13 ENCOUNTER — HOSPITAL ENCOUNTER (OUTPATIENT)
Dept: CARDIOLOGY | Facility: HOSPITAL | Age: 67
Discharge: HOME | End: 2022-09-13
Admitting: INTERNAL MEDICINE

## 2022-09-13 DIAGNOSIS — I48.0 PAROXYSMAL ATRIAL FIBRILLATION: ICD-10-CM

## 2022-09-13 PROCEDURE — 93246 EXT ECG>7D<15D RECORDING: CPT

## 2022-09-26 DIAGNOSIS — I10 ESSENTIAL HYPERTENSION: ICD-10-CM

## 2022-09-26 DIAGNOSIS — E78.00 PURE HYPERCHOLESTEROLEMIA: ICD-10-CM

## 2022-09-26 DIAGNOSIS — N40.1 BENIGN PROSTATIC HYPERPLASIA WITH URINARY OBSTRUCTION: ICD-10-CM

## 2022-09-26 DIAGNOSIS — N13.8 BENIGN PROSTATIC HYPERPLASIA WITH URINARY OBSTRUCTION: ICD-10-CM

## 2022-09-26 RX ORDER — TAMSULOSIN HYDROCHLORIDE 0.4 MG/1
CAPSULE ORAL
Qty: 90 CAPSULE | Refills: 3 | Status: SHIPPED | OUTPATIENT
Start: 2022-09-26

## 2022-09-26 RX ORDER — SIMVASTATIN 40 MG
TABLET ORAL
Qty: 90 TABLET | Refills: 3 | Status: SHIPPED | OUTPATIENT
Start: 2022-09-26

## 2022-09-26 RX ORDER — LOSARTAN POTASSIUM 25 MG/1
TABLET ORAL
Qty: 90 TABLET | Refills: 0 | Status: SHIPPED | OUTPATIENT
Start: 2022-09-26

## 2022-10-01 LAB
MAXIMAL PREDICTED HEART RATE: 151 BPM
STRESS TARGET HR: 128 BPM

## 2022-10-01 PROCEDURE — 93248 EXT ECG>7D<15D REV&INTERPJ: CPT | Performed by: INTERNAL MEDICINE

## 2022-10-03 DIAGNOSIS — I48.0 PAROXYSMAL ATRIAL FIBRILLATION (HCC): ICD-10-CM

## 2022-10-04 RX ORDER — APIXABAN 5 MG/1
TABLET, FILM COATED ORAL
Qty: 60 TABLET | Refills: 0 | Status: SHIPPED | OUTPATIENT
Start: 2022-10-04 | End: 2022-11-03

## 2022-10-31 ENCOUNTER — OFFICE VISIT (OUTPATIENT)
Dept: GASTROENTEROLOGY | Facility: CLINIC | Age: 67
End: 2022-10-31

## 2022-10-31 VITALS
TEMPERATURE: 96.9 F | WEIGHT: 221 LBS | OXYGEN SATURATION: 97 % | DIASTOLIC BLOOD PRESSURE: 80 MMHG | BODY MASS INDEX: 32.73 KG/M2 | HEART RATE: 74 BPM | HEIGHT: 69 IN | SYSTOLIC BLOOD PRESSURE: 128 MMHG

## 2022-10-31 DIAGNOSIS — I10 HTN (HYPERTENSION), BENIGN: ICD-10-CM

## 2022-10-31 DIAGNOSIS — Z79.01 ANTICOAGULATED: ICD-10-CM

## 2022-10-31 DIAGNOSIS — Z12.11 ENCOUNTER FOR SCREENING FOR MALIGNANT NEOPLASM OF COLON: Primary | ICD-10-CM

## 2022-10-31 DIAGNOSIS — R13.19 ESOPHAGEAL DYSPHAGIA: ICD-10-CM

## 2022-10-31 DIAGNOSIS — Z78.9 NONSMOKER: ICD-10-CM

## 2022-10-31 PROCEDURE — 99214 OFFICE O/P EST MOD 30 MIN: CPT | Performed by: CLINICAL NURSE SPECIALIST

## 2022-10-31 RX ORDER — SODIUM, POTASSIUM,MAG SULFATES 17.5-3.13G
SOLUTION, RECONSTITUTED, ORAL ORAL
Qty: 177 ML | Refills: 0 | Status: SHIPPED | OUTPATIENT
Start: 2022-10-31 | End: 2023-02-28

## 2022-10-31 NOTE — PROGRESS NOTES
Pawel Thompson  1955      10/31/2022  Chief Complaint   Patient presents with   • Colonoscopy     Subjective   HPI  Pawel Thompson is a 67 y.o. male who presents as a referral for preventative maintenance. He has no complaints of nausea or vomiting. No change in bowels. No wt loss. No BRBPR. No melena. There is NO family hx for colon cancer. No abdominal pain.  He is having some difficulty with swallowing he says. He has had to have dilatation in the past many years ago. He says occasionally he has something that wont go down. He has the most difficulty with meat. Moderate for him. He has had reflux for many years. Fairly stable with Prilosec. He says it is a rare occasion he has any burning or acid. No nausea or vomiting. No wt loss. No epigastric pain.   Past Medical History:   Diagnosis Date   • Benign prostatic hyperplasia with lower urinary tract symptoms 12/6/2017   • Coronary artery disease    • COVID-19    • Elevated PSA 12/6/2016   • Hyperlipidemia    • Hypertension    • New onset atrial fibrillation (HCC) 8/31/2019     Past Surgical History:   Procedure Laterality Date   • APPENDECTOMY     • CARDIAC CATHETERIZATION     • COLONOSCOPY  09/11/2012    Tics repeat exam in 10 years   • CORONARY ARTERY BYPASS GRAFT       Outpatient Medications Marked as Taking for the 10/31/22 encounter (Office Visit) with Cheryl Call APRN   Medication Sig Dispense Refill   • atenolol (TENORMIN) 25 MG tablet Take 25 mg by mouth Daily.     • coenzyme Q10 100 MG capsule Take 100 mg by mouth Daily.     • Eliquis 5 MG tablet tablet TAKE 1 TABLET BY MOUTH EVERY 12 HOURS. 60 tablet 0   • finasteride (PROSCAR) 5 MG tablet Take 1 tablet by mouth Daily. 90 tablet 3   • losartan (COZAAR) 25 MG tablet Take 25 mg by mouth Daily.     • Omega-3 Fatty Acids (fish oil) 1000 MG capsule capsule Take 1,000 mg by mouth 2 (two) times a day.     • omeprazole (priLOSEC) 20 MG capsule Take 20 mg by mouth Daily.     • PATIENT SUPPLIED  MEDICATION Indications: 4 life cardio     • simvastatin (ZOCOR) 40 MG tablet Take 40 mg by mouth Every Night.     • tamsulosin (FLOMAX) 0.4 MG capsule 24 hr capsule TAKE 1 CAPSULE EVERY NIGHT 90 capsule 3     Allergies   Allergen Reactions   • Ramipril Angioedema     Lips swell, hives     Social History     Socioeconomic History   • Marital status:    Tobacco Use   • Smoking status: Never   • Smokeless tobacco: Never   Vaping Use   • Vaping Use: Never used   Substance and Sexual Activity   • Alcohol use: Yes     Alcohol/week: 1.0 standard drink     Types: 1 Cans of beer per week     Comment: occasionally   • Drug use: No   • Sexual activity: Defer     Family History   Problem Relation Age of Onset   • Heart disease Mother    • Stroke Mother    • Prostate cancer Father    • Heart disease Father    • Heart attack Brother    • Heart disease Brother    • No Known Problems Brother    • No Known Problems Maternal Grandmother    • No Known Problems Maternal Grandfather    • Heart disease Paternal Grandmother    • No Known Problems Paternal Grandfather    • Colon cancer Neg Hx    • Colon polyps Neg Hx      Health Maintenance   Topic Date Due   • ZOSTER VACCINE (2 of 3) 10/19/2015   • HEPATITIS C SCREENING  Never done   • Pneumococcal Vaccine 65+ (1 - PCV) Never done   • COVID-19 Vaccine (3 - Booster for Moderna series) 06/07/2021   • INFLUENZA VACCINE  Never done   • COLORECTAL CANCER SCREENING  09/11/2022   • LIPID PANEL  08/24/2023   • ANNUAL WELLNESS VISIT  09/08/2023   • TDAP/TD VACCINES (3 - Td or Tdap) 04/11/2026       REVIEW OF SYSTEMS  General: well appearing, no fever chills or sweats, no unexplained wt loss  HEENT: no acute visual or hearing disturbances  Cardiovascular: No chest pain or palpitations  Pulmonary: No shortness of breath, coughing, wheezing or hemoptysis  : No burning, urgency, hematuria, or dysuria  GI: dysphagia  Musculoskeletal: No joint pain or stiffness  Peripheral: no edema  Skin: No  "lesions or rashes  Neuro: No dizziness, headaches, stroke, syncope  Endocrine: No hot or cold intolerances  Hematological: No blood dyscrasias    Objective   Vitals:    10/31/22 0912   BP: 128/80   Pulse: 74   Temp: 96.9 °F (36.1 °C)   TempSrc: Temporal   SpO2: 97%   Weight: 100 kg (221 lb)   Height: 175.3 cm (69\")     Body mass index is 32.64 kg/m².  BMI is >= 30 and <35. (Class 1 Obesity). The following options were offered after discussion;: weight loss educational material (shared in after visit summary)      PHYSICAL EXAM  General: age appropriate well nourished well appearing, no acute distress  Head: normocephalic and atraumatic  Global assessment-supple  Neck-No JVD noted, no lymphadenopathy  Pulmonary-clear to auscultation bilaterally, normal respiratory effort  Cardiovascular-normal rate and rhythm, normal heart sounds, S1 and S2 noted  Abdomen-soft, non tender, non distended, normal bowel sounds all 4 quadrants, no hepatosplenomegaly noted  Extremities-No clubbing cyanosis or edema  Neuro-Non focal, converses appropriately, awake, alert, oriented    Assessment & Plan     Diagnoses and all orders for this visit:    1. Encounter for screening for malignant neoplasm of colon (Primary)  -     Case Request; Standing  -     Follow Anesthesia Guidelines / Protocol; Future  -     Obtain Informed Consent; Future  -     Implement Anesthesia Orders Day of Procedure; Standing  -     Obtain Informed Consent; Standing  -     Verify Bowel Prep Was Successful; Standing  -     Case Request  -     Suprep Bowel Prep Kit 17.5-3.13-1.6 GM/177ML solution oral solution; Take as directed by office instructions provided  Dispense: 177 mL; Refill: 0    2. Esophageal dysphagia    3. Anticoagulated  Comments:  Kadi to hold per Dr Lorenzana he takes for hx of Afib    4. Nonsmoker    5. HTN (hypertension), benign  Comments:  cont BP medication the day of procedure    I explained endoscopy and if normal he may need to see ENT for further " evaluation if symptoms persist. He agrees.   I discussed non pharmaceutical treatment of gerd.  This includes gradually losing weight to achieve ideal body wt., elevation of the head of bed by 4-6 inches, nothing to eat or drink 3 hours prior to lying down, avoiding tight clothing, stress reduction, tobacco cessation, reduction of alcohol intake, and dietary restrictions (avoiding caffeine, coffee, fatty foods, mints, chocolate, spicy foods and tomato based sauces as much as possible).    Continue Prilosec this seems to manage his reflux well.     ESOPHAGOGASTRODUODENOSCOPY WITH ANESTHESIA (N/A), COLONOSCOPY WITH ANESTHESIA (N/A)  Body mass index is 32.64 kg/m².    Patient instructions on prep prior to procedure provided to the patient.    All risks, benefits, alternatives, and indications of colonoscopy procedure have been discussed with the patient. Risks to include perforation of the colon requiring possible surgery or colostomy, risk of bleeding from biopsies or removal of colon tissue, possibility of missing a colon polyp or cancer, or adverse drug reaction.  Benefits to include the diagnosis and management of disease of the colon and rectum. Alternatives to include barium enema, radiographic evaluation, lab testing or no intervention. Pt verbalizes understanding and agrees.     Cheryl Call, JOSETTE  10/31/2022  09:45 CDT      IF YOU SMOKE OR USE TOBACCO PLEASE READ THE FOLLOWIN minutes reading provided    Why is smoking bad for me?  Smoking increases the risk of heart disease, lung disease, vascular disease, stroke, and cancer.     If you smoke, STOP!    If you would like more information on quitting smoking, please visit the [employer] website: [email or web address]   This link will provide additional resources including the QUIT line and the [employer] support groups.     For more information:    Quit Now  [place]  1-800-QUIT-NOW  https://[place].quitlogix.org/en-[place]/

## 2022-11-03 RX ORDER — APIXABAN 5 MG/1
TABLET, FILM COATED ORAL
Qty: 60 TABLET | Refills: 0 | Status: SHIPPED | OUTPATIENT
Start: 2022-11-03 | End: 2022-12-06 | Stop reason: SDUPTHER

## 2022-11-03 NOTE — TELEPHONE ENCOUNTER
3. PAF- stable. NSR today. continue atenolol and eliquis [id number] 3. No known recurrence since his admission in 2018. Would not be unreasonable to obtain a holter monitor at next visit to assess for afib and possibly stop anticoagulation     Patient scheduled 11/28/2022

## 2022-11-07 ENCOUNTER — TELEPHONE (OUTPATIENT)
Dept: GASTROENTEROLOGY | Facility: CLINIC | Age: 67
End: 2022-11-07

## 2022-11-07 NOTE — TELEPHONE ENCOUNTER
----- Message from Tato Lorenzana MD sent at 11/3/2022  8:39 AM CDT -----  Ok to hold eliquis for 2 days. No indication for lovenox  ----- Message -----  From: Ramsey Malone MA  Sent: 10/31/2022   3:04 PM CDT  To: Tato Lorenzana MD

## 2022-11-08 NOTE — PROGRESS NOTES
Subjective    Mr. Thompson is 67 y.o. male    Chief Complaint: BPH    History of Present Illness    67-year-old male established patient on Eliquis follow-up for enlarged prostate on finasteride and tamsulosin.  His LUTS are currently controlled on combination therapy.  PSA this year remains normal 1.37 adjusted to 2.7 for finasteride effect.  He reports a family history of prostate cancer.  He does have difficulty obtaining and maintaining erections but desires no treatment at this time.  UA today is clear.  Review of his chart indicates he did have a CT scan at [facility] in 2017 reporting a right lower pole kidney stone that was not given a measurement.  He denies flank pain or hematuria.    Lab Results   Component Value Date    PSA 1.370 10/31/2022    PSA 0.947 11/03/2021    PSA 0.97 08/30/2021       The following portions of the patient's history were reviewed and updated as appropriate: allergies, current medications, past family history, past medical history, past social history, past surgical history and problem list.    Review of Systems      Current Outpatient Medications:   •  atenolol (TENORMIN) 25 MG tablet, Take 25 mg by mouth Daily., Disp: , Rfl:   •  Eliquis 5 MG tablet tablet, TAKE 1 TABLET BY MOUTH EVERY 12 HOURS., Disp: 60 tablet, Rfl: 0  •  finasteride (PROSCAR) 5 MG tablet, Take 1 tablet by mouth Daily., Disp: 90 tablet, Rfl: 3  •  losartan (COZAAR) 25 MG tablet, Take 25 mg by mouth Daily., Disp: , Rfl:   •  Omega-3 Fatty Acids (fish oil) 1000 MG capsule capsule, Take 1,000 mg by mouth 2 (two) times a day., Disp: , Rfl:   •  omeprazole (priLOSEC) 20 MG capsule, Take 20 mg by mouth Daily., Disp: , Rfl:   •  PATIENT SUPPLIED MEDICATION, Indications: 4 life cardio, Disp: , Rfl:   •  simvastatin (ZOCOR) 40 MG tablet, Take 40 mg by mouth Every Night., Disp: , Rfl:   •  tamsulosin (FLOMAX) 0.4 MG capsule 24 hr capsule, TAKE 1 CAPSULE EVERY NIGHT, Disp: 90 capsule, Rfl: 3  •  Suprep Bowel Prep Kit  "17.5-3.13-1.6 GM/177ML solution oral solution, Take as directed by office instructions provided, Disp: 177 mL, Rfl: 0    Past Medical History:   Diagnosis Date   • Benign prostatic hyperplasia with lower urinary tract symptoms 12/6/2017   • Coronary artery disease    • COVID-19    • Elevated PSA 12/6/2016   • Hyperlipidemia    • Hypertension    • New onset atrial fibrillation (HCC) 8/31/2019       Past Surgical History:   Procedure Laterality Date   • APPENDECTOMY     • CARDIAC CATHETERIZATION     • COLONOSCOPY  09/11/2012    Tics repeat exam in 10 years   • CORONARY ARTERY BYPASS GRAFT         Social History     Socioeconomic History   • Marital status:    Tobacco Use   • Smoking status: Never   • Smokeless tobacco: Never   Vaping Use   • Vaping Use: Never used   Substance and Sexual Activity   • Alcohol use: Yes     Alcohol/week: 1.0 standard drink     Types: 1 Cans of beer per week     Comment: occasionally   • Drug use: No   • Sexual activity: Defer       Family History   Problem Relation Age of Onset   • Heart disease Mother    • Stroke Mother    • Prostate cancer Father    • Heart disease Father    • Heart attack Brother    • Heart disease Brother    • No Known Problems Brother    • No Known Problems Maternal Grandmother    • No Known Problems Maternal Grandfather    • Heart disease Paternal Grandmother    • No Known Problems Paternal Grandfather    • Colon cancer Neg Hx    • Colon polyps Neg Hx        Objective    Temp 97 °F (36.1 °C)   Ht 175.3 cm (69\")   Wt 101 kg (221 lb 9.6 oz)   BMI 32.72 kg/m²     Physical Exam        Results for orders placed or performed in visit on 11/14/22   POC Urinalysis Dipstick, Multipro    Specimen: Urine   Result Value Ref Range    Color Yellow Yellow, Straw, Dark Yellow, [place]    Clarity, UA Clear Clear    Glucose, UA Negative Negative mg/dL    Bilirubin Negative Negative    Ketones, UA Negative Negative    Specific Gravity  1.015 1.005 - 1.030    Blood, UA Negative " Negative    pH, Urine 6.0 5.0 - 8.0    Protein, POC Negative Negative mg/dL    Urobilinogen, UA Normal Normal, 0.2 E.U./dL    Nitrite, UA Negative Negative    Leukocytes Negative Negative     IPSS Questionnaire (AUA-7):  Incomplete emptying  Over the past month, how often have you had a sensation of not emptying your bladder completely after you finish?: More than half the time (11/14/22 0811)  Frequency  Over the past month, how often have you had to urinate again less than two hours after you finishing urinating ?: More than half the time (11/14/22 0811)  Intermittency  Over the past month, how often have you found you stopped and started again several time when you urinated ?: Not at all (11/14/22 0811)  Urgency  Over the last month, how difficult  have you found it to postpone urination ?: About half the time (11/14/22 0811)  Weak Stream  Over the past month, how often have you had a weak urinary stream ?: Less than 1 time in 5 (11/14/22 0811)  Straining  Over the past month, how often have you had to push or strain to begin urination ?: Not at all (11/14/22 0811)  Nocturia  Over the past month, how many times did you most typically get up to urinate from the time you went to bed until the time you got up in the morning ?: Less than half the time (11/14/22 0811)  Quality of life due to urinary symptoms  If you were to spend the rest of your life with your urinary condition the way it is now, how would feel about that?: Mixed - about equally satisfied (11/14/22 0811)    Scores  Total IPSS Score: 14 (11/14/22 0811)  Total Score = Symtomatic Level: moderately symptomatic: 8-19 (11/14/22 0811)        Assessment and Plan    Diagnoses and all orders for this visit:    1. Benign prostatic hyperplasia with urinary obstruction (Primary)  -     POC Urinalysis Dipstick, Multipro    2. Benign prostatic hyperplasia with lower urinary tract symptoms, symptom details unspecified  -     finasteride (PROSCAR) 5 MG tablet; Take 1  tablet by mouth Daily.  Dispense: 90 tablet; Refill: 3    3. Kidney stone  -     XR abdomen kub; Future    4. Urine frequency  -     PSA DIAGNOSTIC; Future      Normal PSA for his age.  LUTS well-controlled on combination therapy.  Continue finasteride and tamsulosin.  Given his family history prostate cancer, He will follow-up in 1 year with preclinic PSA and also with pre-clinic KUB given his history of kidney stone on CT in 2017.      This document has been signed by DASHAWN Velazquez MD on November 14, 2022 09:04 CST

## 2022-11-14 ENCOUNTER — OFFICE VISIT (OUTPATIENT)
Dept: UROLOGY | Facility: CLINIC | Age: 67
End: 2022-11-14

## 2022-11-14 VITALS — WEIGHT: 221.6 LBS | HEIGHT: 69 IN | TEMPERATURE: 97 F | BODY MASS INDEX: 32.82 KG/M2

## 2022-11-14 DIAGNOSIS — N40.1 BENIGN PROSTATIC HYPERPLASIA WITH LOWER URINARY TRACT SYMPTOMS, SYMPTOM DETAILS UNSPECIFIED: ICD-10-CM

## 2022-11-14 DIAGNOSIS — R35.0 URINE FREQUENCY: ICD-10-CM

## 2022-11-14 DIAGNOSIS — N13.8 BENIGN PROSTATIC HYPERPLASIA WITH URINARY OBSTRUCTION: Primary | ICD-10-CM

## 2022-11-14 DIAGNOSIS — N40.1 BENIGN PROSTATIC HYPERPLASIA WITH URINARY OBSTRUCTION: Primary | ICD-10-CM

## 2022-11-14 DIAGNOSIS — N20.0 KIDNEY STONE: ICD-10-CM

## 2022-11-14 LAB
BILIRUB BLD-MCNC: NEGATIVE MG/DL
CLARITY, POC: CLEAR
COLOR UR: YELLOW
GLUCOSE UR STRIP-MCNC: NEGATIVE MG/DL
KETONES UR QL: NEGATIVE
LEUKOCYTE EST, POC: NEGATIVE
NITRITE UR-MCNC: NEGATIVE MG/ML
PH UR: 6 [PH] (ref 5–8)
PROT UR STRIP-MCNC: NEGATIVE MG/DL
RBC # UR STRIP: NEGATIVE /UL
SP GR UR: 1.01 (ref 1–1.03)
UROBILINOGEN UR QL: NORMAL

## 2022-11-14 PROCEDURE — 81001 URINALYSIS AUTO W/SCOPE: CPT | Performed by: UROLOGY

## 2022-11-14 PROCEDURE — 99214 OFFICE O/P EST MOD 30 MIN: CPT | Performed by: UROLOGY

## 2022-11-14 RX ORDER — FINASTERIDE 5 MG/1
5 TABLET, FILM COATED ORAL DAILY
Qty: 90 TABLET | Refills: 3 | Status: SHIPPED | OUTPATIENT
Start: 2022-11-14

## 2022-11-28 ENCOUNTER — OFFICE VISIT (OUTPATIENT)
Dept: CARDIOLOGY | Facility: CLINIC | Age: 67
End: 2022-11-28

## 2022-11-28 VITALS
OXYGEN SATURATION: 100 % | WEIGHT: 223 LBS | DIASTOLIC BLOOD PRESSURE: 72 MMHG | SYSTOLIC BLOOD PRESSURE: 130 MMHG | BODY MASS INDEX: 31.92 KG/M2 | HEART RATE: 49 BPM | HEIGHT: 70 IN

## 2022-11-28 DIAGNOSIS — I48.0 PAF (PAROXYSMAL ATRIAL FIBRILLATION): Primary | ICD-10-CM

## 2022-11-28 DIAGNOSIS — I10 PRIMARY HYPERTENSION: ICD-10-CM

## 2022-11-28 DIAGNOSIS — I25.810 CORONARY ARTERY DISEASE INVOLVING CORONARY BYPASS GRAFT OF NATIVE HEART WITHOUT ANGINA PECTORIS: ICD-10-CM

## 2022-11-28 PROBLEM — E78.2 MIXED HYPERLIPIDEMIA: Status: ACTIVE | Noted: 2019-08-31

## 2022-11-28 PROCEDURE — 99213 OFFICE O/P EST LOW 20 MIN: CPT | Performed by: INTERNAL MEDICINE

## 2022-11-28 PROCEDURE — 93000 ELECTROCARDIOGRAM COMPLETE: CPT | Performed by: INTERNAL MEDICINE

## 2022-11-28 NOTE — PROGRESS NOTES
Referring Provider: Randi Parks MD    Reason for Follow-up Visit: CAD    Subjective .   Chief Complaint:   Chief Complaint   Patient presents with   • Atrial Fibrillation     Pt wore a monitor in October because he was having some fast HR.  There was no Afib on the monitor.  Pt wants to get off the Eliquis if possible.     • Coronary Artery Disease     Pt states he has been doing well with no complaints of symptoms.   • Hypertension     Pt states this has been good when checked at his doctor appts.   • Hyperlipidemia     Last lab done 8/24/22 LDL was 62 on Zocor 40 mg       History of present illness:  Pawel Thompson is a 67 y.o. yo male with CAD, s/p CABG 18 yrs ago. He denies any chest pain or SOB.       History  Past Medical History:   Diagnosis Date   • Benign prostatic hyperplasia with lower urinary tract symptoms 12/6/2017   • Coronary artery disease    • COVID-19    • Elevated PSA 12/6/2016   • Hyperlipidemia    • Hypertension    • New onset atrial fibrillation (HCC) 8/31/2019   ,   Past Surgical History:   Procedure Laterality Date   • APPENDECTOMY     • CARDIAC CATHETERIZATION     • COLONOSCOPY  09/11/2012    Tics repeat exam in 10 years   • CORONARY ARTERY BYPASS GRAFT     ,   Family History   Problem Relation Age of Onset   • Heart disease Mother    • Stroke Mother    • Prostate cancer Father    • Heart disease Father    • Heart attack Brother    • Heart disease Brother    • No Known Problems Brother    • No Known Problems Maternal Grandmother    • No Known Problems Maternal Grandfather    • Heart disease Paternal Grandmother    • No Known Problems Paternal Grandfather    • Colon cancer Neg Hx    • Colon polyps Neg Hx    ,   Social History     Tobacco Use   • Smoking status: Never   • Smokeless tobacco: Never   Vaping Use   • Vaping Use: Never used   Substance Use Topics   • Alcohol use: Yes     Alcohol/week: 1.0 standard drink     Types: 1 Cans of beer per week     Comment: occasionally   • Drug use:  "Never   ,     Medications  Current Outpatient Medications   Medication Sig Dispense Refill   • atenolol (TENORMIN) 25 MG tablet Take 25 mg by mouth Daily.     • Eliquis 5 MG tablet tablet TAKE 1 TABLET BY MOUTH EVERY 12 HOURS. 60 tablet 0   • finasteride (PROSCAR) 5 MG tablet Take 1 tablet by mouth Daily. 90 tablet 3   • losartan (COZAAR) 25 MG tablet Take 25 mg by mouth Daily.     • Omega-3 Fatty Acids (fish oil) 1000 MG capsule capsule Take 1,000 mg by mouth 2 (two) times a day.     • omeprazole (priLOSEC) 20 MG capsule Take 20 mg by mouth Daily.     • PATIENT SUPPLIED MEDICATION Indications: 4 life cardio     • simvastatin (ZOCOR) 40 MG tablet Take 40 mg by mouth Every Night.     • tamsulosin (FLOMAX) 0.4 MG capsule 24 hr capsule TAKE 1 CAPSULE EVERY NIGHT 90 capsule 3   • Suprep Bowel Prep Kit 17.5-3.13-1.6 GM/177ML solution oral solution Take as directed by office instructions provided 177 mL 0     No current facility-administered medications for this visit.       Allergies:  Ramipril    Review of Systems  Review of Systems   HENT: Negative for nosebleeds.    Cardiovascular: Negative for chest pain, claudication, dyspnea on exertion, leg swelling, near-syncope, orthopnea, palpitations, paroxysmal nocturnal dyspnea and syncope.   Respiratory: Negative for hemoptysis and shortness of breath.    Gastrointestinal: Negative for melena.   Genitourinary: Negative for hematuria.       Objective     Physical Exam:  /72   Pulse (!) 49   Ht 177.8 cm (70\")   Wt 101 kg (223 lb)   SpO2 100%   BMI 32.00 kg/m²   Pulmonary:      Effort: Pulmonary effort is normal.      Breath sounds: Normal breath sounds.   Cardiovascular:      Normal rate. Regular rhythm.      Murmurs: There is no murmur.   Edema:     Peripheral edema absent.         Results Review:    ECG 12 Lead    Date/Time: 11/28/2022 9:24 AM  Performed by: Tato Lorenzana MD  Authorized by: Tato Lorenzana MD   Comparison: compared with previous ECG   Similar to " previous ECG  Rhythm: sinus bradycardia  Rate: normal  Conduction: conduction normal  ST Segments: ST segments normal  T Waves: T waves normal  QRS axis: normal    Clinical impression: normal ECG            Office Visit on 11/14/2022   Component Date Value Ref Range Status   • Color 11/14/2022 Yellow  Yellow, Straw, Dark Yellow, [place] Final   • Clarity, UA 11/14/2022 Clear  Clear Final   • Glucose, UA 11/14/2022 Negative  Negative mg/dL Final   • Bilirubin 11/14/2022 Negative  Negative Final   • Ketones, UA 11/14/2022 Negative  Negative Final   • Specific Gravity  11/14/2022 1.015  1.005 - 1.030 Final   • Blood, UA 11/14/2022 Negative  Negative Final   • pH, Urine 11/14/2022 6.0  5.0 - 8.0 Final   • Protein, POC 11/14/2022 Negative  Negative mg/dL Final   • Urobilinogen, UA 11/14/2022 Normal  Normal, 0.2 E.U./dL Final   • Nitrite, UA 11/14/2022 Negative  Negative Final   • Leukocytes 11/14/2022 Negative  Negative Final       Assessment & Plan   Problem List Items Addressed This Visit        Cardiac and Vasculature    PAF (paroxysmal atrial fibrillation) (HCC) - Primary    Current Assessment & Plan     Chronic, anticoagulated, rate controlled and asymptomatic           Coronary artery disease involving coronary bypass graft of native heart without angina pectoris    Current Assessment & Plan     The patient denies chest pain, shortness of breath, dyspnea on exertion, orthopnea, PND, edema. There is no evidence of ongoing ischemia           Primary hypertension    Current Assessment & Plan     Good control            Medical Complexity  must have 1 out of 3     Moderate Complexity Level 3           1 of the following medical problems:          []One chronic illness with mild exacerbation         [x]Two or more stable chronic illness          []One new problem  []One acute illness with systemic symptoms    Complexity of Data  Reviewed (1 out of the 3 following categories)      Category 1 tests, documents, historian  (must have 3 points)       []Review of prior external records  [x]Review of results of unique tests  []Ordering unique tests  []Assessment requires an independent historian    Category 2 Interpretation of tests   []Independent interpretation of test read by another doc    Category 3 Discuss Management/tests  []Discussion with external physician    Risk of complications and/or morbidity          [x]Prescription Drug Management

## 2022-12-01 ENCOUNTER — ANESTHESIA EVENT (OUTPATIENT)
Dept: GASTROENTEROLOGY | Facility: HOSPITAL | Age: 67
End: 2022-12-01

## 2022-12-01 ENCOUNTER — HOSPITAL ENCOUNTER (OUTPATIENT)
Facility: HOSPITAL | Age: 67
Setting detail: HOSPITAL OUTPATIENT SURGERY
Discharge: HOME | End: 2022-12-01
Attending: INTERNAL MEDICINE | Admitting: INTERNAL MEDICINE

## 2022-12-01 ENCOUNTER — ANESTHESIA (OUTPATIENT)
Dept: GASTROENTEROLOGY | Facility: HOSPITAL | Age: 67
End: 2022-12-01

## 2022-12-01 VITALS
OXYGEN SATURATION: 94 % | WEIGHT: 212 LBS | BODY MASS INDEX: 31.4 KG/M2 | HEIGHT: 69 IN | RESPIRATION RATE: 18 BRPM | TEMPERATURE: 97 F | DIASTOLIC BLOOD PRESSURE: 68 MMHG | SYSTOLIC BLOOD PRESSURE: 120 MMHG | HEART RATE: 64 BPM

## 2022-12-01 DIAGNOSIS — Z12.11 ENCOUNTER FOR SCREENING FOR MALIGNANT NEOPLASM OF COLON: ICD-10-CM

## 2022-12-01 PROCEDURE — G0121 COLON CA SCRN NOT HI RSK IND: HCPCS | Performed by: INTERNAL MEDICINE

## 2022-12-01 PROCEDURE — 88305 TISSUE EXAM BY PATHOLOGIST: CPT | Performed by: INTERNAL MEDICINE

## 2022-12-01 PROCEDURE — 43239 EGD BIOPSY SINGLE/MULTIPLE: CPT | Performed by: INTERNAL MEDICINE

## 2022-12-01 PROCEDURE — 25010000002 PROPOFOL 1000 MG/100ML EMULSION: Performed by: NURSE ANESTHETIST, CERTIFIED REGISTERED

## 2022-12-01 RX ORDER — SODIUM CHLORIDE 9 MG/ML
500 INJECTION, SOLUTION INTRAVENOUS CONTINUOUS PRN
Status: DISCONTINUED | OUTPATIENT
Start: 2022-12-01 | End: 2022-12-01 | Stop reason: HOSPADM

## 2022-12-01 RX ORDER — OMEPRAZOLE 20 MG/1
20 CAPSULE, DELAYED RELEASE ORAL
Qty: 60 CAPSULE | Refills: 2 | Status: SHIPPED | OUTPATIENT
Start: 2022-12-01 | End: 2023-03-01

## 2022-12-01 RX ORDER — PROPOFOL 10 MG/ML
INJECTION, EMULSION INTRAVENOUS AS NEEDED
Status: DISCONTINUED | OUTPATIENT
Start: 2022-12-01 | End: 2022-12-01 | Stop reason: SURG

## 2022-12-01 RX ORDER — LIDOCAINE HYDROCHLORIDE 20 MG/ML
INJECTION, SOLUTION EPIDURAL; INFILTRATION; INTRACAUDAL; PERINEURAL AS NEEDED
Status: DISCONTINUED | OUTPATIENT
Start: 2022-12-01 | End: 2022-12-01 | Stop reason: SURG

## 2022-12-01 RX ORDER — SODIUM CHLORIDE 0.9 % (FLUSH) 0.9 %
10 SYRINGE (ML) INJECTION AS NEEDED
Status: DISCONTINUED | OUTPATIENT
Start: 2022-12-01 | End: 2022-12-01 | Stop reason: HOSPADM

## 2022-12-01 RX ADMIN — PROPOFOL 50 MG: 10 INJECTION, EMULSION INTRAVENOUS at 09:55

## 2022-12-01 RX ADMIN — PROPOFOL 50 MG: 10 INJECTION, EMULSION INTRAVENOUS at 09:47

## 2022-12-01 RX ADMIN — PROPOFOL 100 MG: 10 INJECTION, EMULSION INTRAVENOUS at 09:43

## 2022-12-01 RX ADMIN — PROPOFOL 50 MG: 10 INJECTION, EMULSION INTRAVENOUS at 09:51

## 2022-12-01 RX ADMIN — SODIUM CHLORIDE 500 ML: 9 INJECTION, SOLUTION INTRAVENOUS at 08:55

## 2022-12-01 RX ADMIN — LIDOCAINE HYDROCHLORIDE 50 MG: 20 INJECTION, SOLUTION EPIDURAL; INFILTRATION; INTRACAUDAL; PERINEURAL at 09:43

## 2022-12-01 RX ADMIN — PROPOFOL 50 MG: 10 INJECTION, EMULSION INTRAVENOUS at 10:00

## 2022-12-01 RX ADMIN — LIDOCAINE HYDROCHLORIDE 50 MG: 20 INJECTION, SOLUTION EPIDURAL; INFILTRATION; INTRACAUDAL; PERINEURAL at 09:49

## 2022-12-01 NOTE — H&P
[facility]  Pre Procedure History & Physical    Chief Complaint:   Reflux, dysphagia, screening colonoscopy    Subjective     HPI:   Here for endoscopy and colonoscopy.  Intermittent dysphagia.  Reflux.  Also with need for screening colonoscopy    Past Medical History:   Past Medical History:   Diagnosis Date   • Acid reflux    • Benign prostatic hyperplasia with lower urinary tract symptoms 12/06/2017   • Coronary artery disease    • COVID-19    • Elevated PSA 12/06/2016   • Hyperlipidemia    • Hypertension    • New onset atrial fibrillation (HCC) 08/31/2019   • Sleep apnea        Past Surgical History:  Past Surgical History:   Procedure Laterality Date   • APPENDECTOMY     • CARDIAC CATHETERIZATION     • COLONOSCOPY  09/11/2012    Tics repeat exam in 10 years   • CORONARY ARTERY BYPASS GRAFT      3 bypasses       Family History:  Family History   Problem Relation Age of Onset   • Heart disease Mother    • Stroke Mother    • Prostate cancer Father    • Heart disease Father    • Heart attack Brother    • Heart disease Brother    • No Known Problems Brother    • No Known Problems Maternal Grandmother    • No Known Problems Maternal Grandfather    • Heart disease Paternal Grandmother    • No Known Problems Paternal Grandfather    • Colon cancer Neg Hx    • Colon polyps Neg Hx        Social History:   reports that he has never smoked. He has never used smokeless tobacco. He reports current alcohol use of about 1.0 standard drink per week. He reports that he does not use drugs.    Medications:   Prior to Admission medications    Medication Sig Start Date End Date Taking? Authorizing Provider   Suprep Bowel Prep Kit 17.5-3.13-1.6 GM/177ML solution oral solution Take as directed by office instructions provided 10/31/22  Yes Cheryl Call APRN   atenolol (TENORMIN) 25 MG tablet Take 25 mg by mouth Daily.    Provider, Historical, MD   Eliquis 5 MG tablet tablet TAKE 1 TABLET BY MOUTH EVERY 12  "HOURS. 11/3/22   Ebony Simon APRN   finasteride (PROSCAR) 5 MG tablet Take 1 tablet by mouth Daily. 11/14/22   Floyd Velazquez MD   losartan (COZAAR) 25 MG tablet Take 25 mg by mouth Daily.    Nadeem Monroy MD   Omega-3 Fatty Acids (fish oil) 1000 MG capsule capsule Take 1,000 mg by mouth 2 (two) times a day.    Nadeem Monroy MD   omeprazole (priLOSEC) 20 MG capsule Take 20 mg by mouth Daily.    Nadeem Monroy MD   PATIENT SUPPLIED MEDICATION Indications: 4 life cardio    Nadeem Monroy MD   simvastatin (ZOCOR) 40 MG tablet Take 40 mg by mouth Every Night.    Nadeem Monroy MD   tamsulosin (FLOMAX) 0.4 MG capsule 24 hr capsule TAKE 1 CAPSULE EVERY NIGHT 9/26/22   Floyd Velazquez MD       Allergies:  Ramipril    Objective     Blood pressure 137/68, pulse 54, temperature 97 °F (36.1 °C), temperature source Temporal, resp. rate 18, height 175.3 cm (69\"), weight 96.2 kg (212 lb), SpO2 96 %.    Physical Exam   Constitutional: Pt is oriented to person, place, and in no distress.   HENT: Mouth/Throat: Oropharynx is clear.   Cardiovascular: Normal rate, regular rhythm.    Pulmonary/Chest: Effort normal. No respiratory distress. No  wheezes.   Abdominal: Soft. Non-distended.  Skin: Skin is warm and dry.   Psychiatric: Mood, memory, affect and judgment appear normal.     Assessment & Plan     Diagnosis:  Reflux, screening colonoscopy    Anticipated Surgical Procedure:    Proceed with endoscopy and colonoscopy as scheduled    The following major R/B/A were discussed with the patient, however the list is not all inclusive . Risk:  Bleeding (immediate and delayed), perforation (rupture or tear), reaction to medication, missed lesion/cancer, pain during the procedure, infection, need for surgery, need for ostomy, need for mechanical ventilation (breathing machine), death.  Benefits: removal of polyp/tissue, burn/clip/or inject to stop bleeding, removal of foreign body, dilate any " stricture.  Alternatives: Xray or CT, surgery, do nothing with associated risk   The patient was given time to ask question and received explanation, and agrees to proceed as per History and Physical.   No guarantee given or expressed.    EMR Dragon/transcription disclaimer: Much of this encounter note is an electronic transcription/translation of spoken language to printed text.  The electronic translation of spoken language may permit erroneous, or at times, nonsensical words or phrases to be inadvertently transcribed.  Although I have reviewed the note for such errors, some may still exist.    Juan Luis Young MD  09:39 CST  12/1/2022

## 2022-12-01 NOTE — ANESTHESIA PREPROCEDURE EVALUATION
Anesthesia Evaluation     Patient summary reviewed   no history of anesthetic complications:  NPO Solid Status: > 8 hours             Airway   Mallampati: II  Dental      Pulmonary    (+) sleep apnea on CPAP,   Cardiovascular   Exercise tolerance: excellent (>7 METS)    (+) hypertension, CAD, CABG (2003), dysrhythmias Atrial Fib, hyperlipidemia,   (-) angina      Neuro/Psych- negative ROS  GI/Hepatic/Renal/Endo    (+) obesity,  GERD,      Musculoskeletal     Abdominal    Substance History      OB/GYN          Other                        Anesthesia Plan    ASA 3     MAC       Anesthetic plan, risks, benefits, and alternatives have been provided, discussed and informed consent has been obtained with: patient.        CODE STATUS:

## 2022-12-01 NOTE — ANESTHESIA POSTPROCEDURE EVALUATION
Patient: Pawel Thompson    Procedure Summary     Date: 12/01/22 Room / Location: [facility] ENDOSCOPY 2 / [facility]    Anesthesia Start: 0939 Anesthesia Stop: 1006    Procedures:       ESOPHAGOGASTRODUODENOSCOPY WITH ANESTHESIA      COLONOSCOPY WITH ANESTHESIA Diagnosis:       Encounter for screening for malignant neoplasm of colon      (Encounter for screening for malignant neoplasm of colon [Z12.11])    Surgeons: Juan Luis Young MD Provider: Krzysztof Arias CRNA    Anesthesia Type: MAC ASA Status: 3          Anesthesia Type: MAC    Vitals  No vitals data found for the desired time range.          Post Anesthesia Care and Evaluation    Patient location during evaluation: PHASE II  Patient participation: complete - patient participated  Level of consciousness: awake  Pain score: 0  Pain management: adequate  Anesthetic complications: No anesthetic complications  PONV Status: none  Cardiovascular status: acceptable  Respiratory status: acceptable  Hydration status: acceptable

## 2022-12-02 LAB
CYTO UR: NORMAL
LAB AP CASE REPORT: NORMAL
Lab: NORMAL
PATH REPORT.FINAL DX SPEC: NORMAL
PATH REPORT.GROSS SPEC: NORMAL

## 2022-12-06 PROBLEM — K20.90 ESOPHAGITIS: Status: ACTIVE | Noted: 2022-12-06

## 2022-12-06 RX ORDER — APIXABAN 5 MG/1
TABLET, FILM COATED ORAL
Qty: 60 TABLET | Refills: 11 | Status: SHIPPED | OUTPATIENT
Start: 2022-12-06

## 2022-12-25 DIAGNOSIS — I10 ESSENTIAL HYPERTENSION: ICD-10-CM

## 2022-12-27 RX ORDER — LOSARTAN POTASSIUM 25 MG/1
TABLET ORAL
Qty: 90 TABLET | Refills: 0 | Status: SHIPPED | OUTPATIENT
Start: 2022-12-27

## 2023-02-28 ENCOUNTER — PRE-ADMISSION TESTING (OUTPATIENT)
Dept: PREADMISSION TESTING | Facility: HOSPITAL | Age: 68
End: 2023-02-28
Payer: MEDICARE

## 2023-02-28 VITALS
HEIGHT: 69 IN | OXYGEN SATURATION: 97 % | HEART RATE: 63 BPM | DIASTOLIC BLOOD PRESSURE: 49 MMHG | SYSTOLIC BLOOD PRESSURE: 134 MMHG | BODY MASS INDEX: 32.1 KG/M2 | RESPIRATION RATE: 20 BRPM | WEIGHT: 216.71 LBS

## 2023-02-28 LAB
ANION GAP SERPL CALCULATED.3IONS-SCNC: 11 MMOL/L (ref 5–15)
BUN SERPL-MCNC: 12 MG/DL (ref 8–23)
BUN/CREAT SERPL: 12.4 (ref 7–25)
CALCIUM SPEC-SCNC: 9.2 MG/DL (ref 8.6–10.5)
CHLORIDE SERPL-SCNC: 101 MMOL/L (ref 98–107)
CO2 SERPL-SCNC: 26 MMOL/L (ref 22–29)
CREAT SERPL-MCNC: 0.97 MG/DL (ref 0.76–1.27)
DEPRECATED RDW RBC AUTO: 42.9 FL (ref 37–54)
EGFRCR SERPLBLD CKD-EPI 2021: 84.5 ML/MIN/1.73
ERYTHROCYTE [DISTWIDTH] IN BLOOD BY AUTOMATED COUNT: 12.9 % (ref 12.3–15.4)
GLUCOSE SERPL-MCNC: 87 MG/DL (ref 65–99)
HCT VFR BLD AUTO: 42.9 % (ref 37.5–51)
HGB BLD-MCNC: 13.7 G/DL (ref 13–17.7)
MCH RBC QN AUTO: 28.9 PG (ref 26.6–33)
MCHC RBC AUTO-ENTMCNC: 31.9 G/DL (ref 31.5–35.7)
MCV RBC AUTO: 90.5 FL (ref 79–97)
PLATELET # BLD AUTO: 190 10*3/MM3 (ref 140–450)
PMV BLD AUTO: 10.7 FL (ref 6–12)
POTASSIUM SERPL-SCNC: 4.5 MMOL/L (ref 3.5–5.2)
RBC # BLD AUTO: 4.74 10*6/MM3 (ref 4.14–5.8)
SODIUM SERPL-SCNC: 138 MMOL/L (ref 136–145)
WBC NRBC COR # BLD: 7.93 10*3/MM3 (ref 3.4–10.8)

## 2023-02-28 PROCEDURE — 36415 COLL VENOUS BLD VENIPUNCTURE: CPT

## 2023-02-28 PROCEDURE — 80048 BASIC METABOLIC PNL TOTAL CA: CPT

## 2023-02-28 PROCEDURE — 85027 COMPLETE CBC AUTOMATED: CPT

## 2023-03-02 NOTE — DISCHARGE INSTRUCTIONS
UPPER EXTREMITY POST-OP INSTRUCTIONS - DR. DALY    IMPORTANT PHONE NUMBERS:   For emergencies, please call [phone]   You may reach Dr. Daly and clinical staff at [phone]- M-F 8:00 am-5:00 pm   After 5pm or on the weekends, please call [phone]   Call immediately if you have any of the following symptoms:     Elevated temperature above 101.5 degrees for more than 48 hours after surgery     Persistent drainage from wound     Severe pain around surgical site    Sling use: The sling is provided for your comfort and to ensure proper healing of your repair following surgery. Please place the abduction pillow with the curved side against your side and the sling on the side of the pillow. Your surgery requires that you wear the sling if noted below.  ____ For comfort. Remove sling 24 hours and begin range of motion exercises  ____ At all times except bathing, dressing, and therapy. Also wear the sling during sleep.  __x__ No sling required    Bathing:  ___No bandages, no restrictions!!  _x__You may remove you dressing and shower on the 3rd day after surgery (Ex. Tu surgery, shower on Friday)  ** if you are told to it is ok to remove your dressing and shower, DO NOT SOAK your incisions in a tub.  ___Keep splint clean, dry, and intact. DO NOT place foreign objects into your splint.      Dressings: Keep dressing/splint intact unless instructed otherwise below. SOME DRAINAGE IS NORMAL!     DO NOT touch or apply ointment to the incision.     DO NOT remove the steri-strips over the incisions (if you have steri-strips). They will         generally fall off on their own or can be removed 1 weeksafter surgery.     If you have yellow gauze and it comes off, do not worry about it. Leave them off.    Signs of infection that warrant a phone call to our clinical line:     o Excessive drainage or redness     o Red streaking coming away from the incision  o Increased pain  o Increased temperature above 101  degrees      Physical Therapy:        *  Your physical therapy status will be discussed with you postoperatively and at your first post-op appointment. Some injuries will not require physical therapy.      *  If you have a shoulder manipulation, please schedule therapy for the next day      Medications: You will be discharged with the appropriate medications following your surgery. Fill these at the pharmacy and take them as directed on the label. Not all of the medications below may be prescribed. Occasionally, other medications may be prescribed with specific instructions.    Percocet/Lortab (oxycodone/hydrocodone with tylenol) - Pain Medication, will cause drowsiness, possibly itchiness (this is NOT an allergy - use benadryl or an over the counter allergy medication such as Claritin or Zyrtec)     o Take 1-2 tablets every 4-6 hours. DO NOT EXCEED 4,000mg of Tylenol in 24 hours.  **DO NOT MIX WITH ALCOHOL, DRIVE WHILE TAKING, OR TAKE with extra TYLENOL**    Colace (Docusate) - stool softener, used for constipation. Take this only if you feel constipated.      Zofran (Ondansetron) or Phenergan - Anti-nausea medication, will cause drowsiness      *Starting January 2021, all narcotic medication must be prescribed electronically to your pharmacy.  Be sure to notify nursing of your preferred pharmacy.  If you are running low on pain medications, please notify us if you need a refill 24-48 hours prior to when you run out, so we can make arrangements to refill the prescription for you if we determine is necessary

## 2023-03-03 DIAGNOSIS — K21.9 GASTROESOPHAGEAL REFLUX DISEASE, UNSPECIFIED WHETHER ESOPHAGITIS PRESENT: Primary | ICD-10-CM

## 2023-03-03 PROBLEM — M65.332 TRIGGER FINGER, LEFT MIDDLE FINGER: Status: ACTIVE | Noted: 2023-03-03

## 2023-03-03 NOTE — OP NOTE
Patient Name: Ten  : 1955  MRN: [id number]    DATE of SURGERY: 3/7/2023    SURGEON: Warren Houser MD    ASSISTANT: NONE    PREOPERATIVE DIAGNOSES: Left middle trigger finger      POSTOPERATIVE DIAGNOSES: Left middle trigger finger     PROCEDURE PERFORMED: Left middle A1 pulley release (trigger release)       IMPLANTS  None.      ANESTHESIA    General endotracheal anesthesia.      OPERATIVE INDICATIONS    This patient is 67 y.o. male who presented to my clinic with complaints of triggering of the digit listed above.  The patient wished to proceed with surgery and understood the risks, benefits, and alternatives.  I did discuss the risk of damaging neurovascular bundles, recurrence, painful scar formation, infection, anesthetic complications.        ESTIMATED BLOOD LOSS    Less than 10 mL.      SPECIMENS  None.      DRAINS  None.      COMPLICATIONS    None.      PROCEDURE IN DETAIL  The patient was seen in the preoperative holding room; once again, the informed consent form was reviewed with the patient and signed.  The site of surgery was marked with the patient's agreement.  The patient was transported to the operating room, where a timeout was performed, identifying the correct patient, as well as the operative site.  Dose appropriate antibiotics were given as perioperative antibiotics.    The operative upper extremity was prepped and draped in the usual sterile fashion.  The tourniquet was placed about the operative brachium, inflated to 200 mmHg, and total tourniquet time was less than 20 minutes.        A transverse palmar incision was made overlying the involved left middle A1 pulley.  A prominent A1 pulley could be palpated.  Soft tissue was dissected in line with the incision.  The A1 pulley was identified and while protecting the neurovascular bundles both radially and ulnarly a Canadian blade was used to incise the A1 pulley completely.  The tendons were then pulled into the incision site  to relieve any additional adhesions which were present.     The incision was irrigated, followed by closure of the skin with adhesive glue.  A sterile dressing was placed.  The patient was awakened from anesthesia and transported to the recovery room in stable condition.      POSTOPERATIVE PLAN    1.  Discharged home with family.     2.  Follow up in 2 weeks for a clinical check    Electronically signed by Warren Houser MD on 3/7/2023 at 09:02 CST

## 2023-03-06 RX ORDER — OMEPRAZOLE 20 MG/1
CAPSULE, DELAYED RELEASE ORAL
Qty: 60 CAPSULE | Refills: 0 | Status: SHIPPED | OUTPATIENT
Start: 2023-03-06

## 2023-03-07 ENCOUNTER — ANESTHESIA (OUTPATIENT)
Dept: PERIOP | Facility: HOSPITAL | Age: 68
End: 2023-03-07
Payer: MEDICARE

## 2023-03-07 ENCOUNTER — HOSPITAL ENCOUNTER (OUTPATIENT)
Facility: HOSPITAL | Age: 68
Setting detail: HOSPITAL OUTPATIENT SURGERY
Discharge: HOME | End: 2023-03-07
Attending: ORTHOPAEDIC SURGERY | Admitting: ORTHOPAEDIC SURGERY
Payer: MEDICARE

## 2023-03-07 ENCOUNTER — ANESTHESIA EVENT (OUTPATIENT)
Dept: PERIOP | Facility: HOSPITAL | Age: 68
End: 2023-03-07
Payer: MEDICARE

## 2023-03-07 VITALS
HEART RATE: 51 BPM | TEMPERATURE: 97.4 F | DIASTOLIC BLOOD PRESSURE: 70 MMHG | SYSTOLIC BLOOD PRESSURE: 134 MMHG | RESPIRATION RATE: 16 BRPM | OXYGEN SATURATION: 97 %

## 2023-03-07 PROCEDURE — 25010000002 FENTANYL CITRATE (PF) 100 MCG/2ML SOLUTION: Performed by: NURSE ANESTHETIST, CERTIFIED REGISTERED

## 2023-03-07 PROCEDURE — 25010000002 KETOROLAC TROMETHAMINE PER 15 MG: Performed by: NURSE ANESTHETIST, CERTIFIED REGISTERED

## 2023-03-07 PROCEDURE — 25010000002 PROPOFOL 10 MG/ML EMULSION: Performed by: NURSE ANESTHETIST, CERTIFIED REGISTERED

## 2023-03-07 PROCEDURE — 25010000002 ONDANSETRON PER 1 MG: Performed by: NURSE ANESTHETIST, CERTIFIED REGISTERED

## 2023-03-07 PROCEDURE — 25010000002 CEFAZOLIN PER 500 MG: Performed by: ORTHOPAEDIC SURGERY

## 2023-03-07 PROCEDURE — 25010000002 DEXAMETHASONE PER 1 MG: Performed by: NURSE ANESTHETIST, CERTIFIED REGISTERED

## 2023-03-07 RX ORDER — ASPIRIN 81 MG/1
81 TABLET ORAL DAILY
COMMUNITY

## 2023-03-07 RX ORDER — OXYCODONE AND ACETAMINOPHEN 7.5; 325 MG/1; MG/1
2 TABLET ORAL EVERY 4 HOURS PRN
Status: DISCONTINUED | OUTPATIENT
Start: 2023-03-07 | End: 2023-03-07 | Stop reason: HOSPADM

## 2023-03-07 RX ORDER — LABETALOL HYDROCHLORIDE 5 MG/ML
5 INJECTION, SOLUTION INTRAVENOUS
Status: DISCONTINUED | OUTPATIENT
Start: 2023-03-07 | End: 2023-03-07 | Stop reason: HOSPADM

## 2023-03-07 RX ORDER — MAGNESIUM HYDROXIDE 1200 MG/15ML
LIQUID ORAL AS NEEDED
Status: DISCONTINUED | OUTPATIENT
Start: 2023-03-07 | End: 2023-03-07 | Stop reason: HOSPADM

## 2023-03-07 RX ORDER — ONDANSETRON 2 MG/ML
4 INJECTION INTRAMUSCULAR; INTRAVENOUS ONCE AS NEEDED
Status: DISCONTINUED | OUTPATIENT
Start: 2023-03-07 | End: 2023-03-07 | Stop reason: HOSPADM

## 2023-03-07 RX ORDER — PROPOFOL 10 MG/ML
VIAL (ML) INTRAVENOUS AS NEEDED
Status: DISCONTINUED | OUTPATIENT
Start: 2023-03-07 | End: 2023-03-07 | Stop reason: SURG

## 2023-03-07 RX ORDER — MIDAZOLAM HYDROCHLORIDE 1 MG/ML
0.5 INJECTION INTRAMUSCULAR; INTRAVENOUS
Status: DISCONTINUED | OUTPATIENT
Start: 2023-03-07 | End: 2023-03-07 | Stop reason: HOSPADM

## 2023-03-07 RX ORDER — SODIUM CHLORIDE 0.9 % (FLUSH) 0.9 %
10 SYRINGE (ML) INJECTION AS NEEDED
Status: DISCONTINUED | OUTPATIENT
Start: 2023-03-07 | End: 2023-03-07 | Stop reason: HOSPADM

## 2023-03-07 RX ORDER — SODIUM CHLORIDE 0.9 % (FLUSH) 0.9 %
3-10 SYRINGE (ML) INJECTION AS NEEDED
Status: DISCONTINUED | OUTPATIENT
Start: 2023-03-07 | End: 2023-03-07 | Stop reason: HOSPADM

## 2023-03-07 RX ORDER — FENTANYL CITRATE 50 UG/ML
INJECTION, SOLUTION INTRAMUSCULAR; INTRAVENOUS AS NEEDED
Status: DISCONTINUED | OUTPATIENT
Start: 2023-03-07 | End: 2023-03-07 | Stop reason: SURG

## 2023-03-07 RX ORDER — IBUPROFEN 600 MG/1
600 TABLET ORAL ONCE AS NEEDED
Status: DISCONTINUED | OUTPATIENT
Start: 2023-03-07 | End: 2023-03-07 | Stop reason: HOSPADM

## 2023-03-07 RX ORDER — LIDOCAINE HYDROCHLORIDE 10 MG/ML
0.5 INJECTION, SOLUTION EPIDURAL; INFILTRATION; INTRACAUDAL; PERINEURAL ONCE AS NEEDED
Status: DISCONTINUED | OUTPATIENT
Start: 2023-03-07 | End: 2023-03-07 | Stop reason: HOSPADM

## 2023-03-07 RX ORDER — SODIUM CHLORIDE 9 MG/ML
40 INJECTION, SOLUTION INTRAVENOUS AS NEEDED
Status: DISCONTINUED | OUTPATIENT
Start: 2023-03-07 | End: 2023-03-07 | Stop reason: HOSPADM

## 2023-03-07 RX ORDER — SODIUM CHLORIDE 0.9 % (FLUSH) 0.9 %
3 SYRINGE (ML) INJECTION EVERY 12 HOURS SCHEDULED
Status: DISCONTINUED | OUTPATIENT
Start: 2023-03-07 | End: 2023-03-07 | Stop reason: HOSPADM

## 2023-03-07 RX ORDER — DROPERIDOL 2.5 MG/ML
0.62 INJECTION, SOLUTION INTRAMUSCULAR; INTRAVENOUS ONCE AS NEEDED
Status: DISCONTINUED | OUTPATIENT
Start: 2023-03-07 | End: 2023-03-07 | Stop reason: HOSPADM

## 2023-03-07 RX ORDER — OXYCODONE AND ACETAMINOPHEN 10; 325 MG/1; MG/1
1 TABLET ORAL ONCE AS NEEDED
Status: DISCONTINUED | OUTPATIENT
Start: 2023-03-07 | End: 2023-03-07 | Stop reason: HOSPADM

## 2023-03-07 RX ORDER — FLUMAZENIL 0.1 MG/ML
0.2 INJECTION INTRAVENOUS AS NEEDED
Status: DISCONTINUED | OUTPATIENT
Start: 2023-03-07 | End: 2023-03-07 | Stop reason: HOSPADM

## 2023-03-07 RX ORDER — KETOROLAC TROMETHAMINE 30 MG/ML
INJECTION, SOLUTION INTRAMUSCULAR; INTRAVENOUS AS NEEDED
Status: DISCONTINUED | OUTPATIENT
Start: 2023-03-07 | End: 2023-03-07 | Stop reason: SURG

## 2023-03-07 RX ORDER — BUPIVACAINE HCL/0.9 % NACL/PF 0.1 %
2 PLASTIC BAG, INJECTION (ML) EPIDURAL ONCE
Status: COMPLETED | OUTPATIENT
Start: 2023-03-07 | End: 2023-03-07

## 2023-03-07 RX ORDER — HYDROCODONE BITARTRATE AND ACETAMINOPHEN 5; 325 MG/1; MG/1
1 TABLET ORAL EVERY 6 HOURS PRN
COMMUNITY
Start: 2023-03-02

## 2023-03-07 RX ORDER — ACETAMINOPHEN 500 MG
1000 TABLET ORAL ONCE
Status: COMPLETED | OUTPATIENT
Start: 2023-03-07 | End: 2023-03-07

## 2023-03-07 RX ORDER — NALOXONE HCL 0.4 MG/ML
0.4 VIAL (ML) INJECTION AS NEEDED
Status: DISCONTINUED | OUTPATIENT
Start: 2023-03-07 | End: 2023-03-07 | Stop reason: HOSPADM

## 2023-03-07 RX ORDER — SODIUM CHLORIDE 0.9 % (FLUSH) 0.9 %
10 SYRINGE (ML) INJECTION EVERY 12 HOURS SCHEDULED
Status: DISCONTINUED | OUTPATIENT
Start: 2023-03-07 | End: 2023-03-07 | Stop reason: HOSPADM

## 2023-03-07 RX ORDER — PENICILLIN V POTASSIUM 500 MG/1
1 TABLET ORAL 3 TIMES DAILY
COMMUNITY
Start: 2023-03-02

## 2023-03-07 RX ORDER — SODIUM CHLORIDE, SODIUM LACTATE, POTASSIUM CHLORIDE, CALCIUM CHLORIDE 600; 310; 30; 20 MG/100ML; MG/100ML; MG/100ML; MG/100ML
100 INJECTION, SOLUTION INTRAVENOUS CONTINUOUS
Status: DISCONTINUED | OUTPATIENT
Start: 2023-03-07 | End: 2023-03-07 | Stop reason: HOSPADM

## 2023-03-07 RX ORDER — DEXAMETHASONE SODIUM PHOSPHATE 4 MG/ML
INJECTION, SOLUTION INTRA-ARTICULAR; INTRALESIONAL; INTRAMUSCULAR; INTRAVENOUS; SOFT TISSUE AS NEEDED
Status: DISCONTINUED | OUTPATIENT
Start: 2023-03-07 | End: 2023-03-07 | Stop reason: SURG

## 2023-03-07 RX ORDER — LIDOCAINE HYDROCHLORIDE 20 MG/ML
INJECTION, SOLUTION EPIDURAL; INFILTRATION; INTRACAUDAL; PERINEURAL AS NEEDED
Status: DISCONTINUED | OUTPATIENT
Start: 2023-03-07 | End: 2023-03-07 | Stop reason: SURG

## 2023-03-07 RX ORDER — ONDANSETRON 2 MG/ML
INJECTION INTRAMUSCULAR; INTRAVENOUS AS NEEDED
Status: DISCONTINUED | OUTPATIENT
Start: 2023-03-07 | End: 2023-03-07 | Stop reason: SURG

## 2023-03-07 RX ORDER — SODIUM CHLORIDE, SODIUM LACTATE, POTASSIUM CHLORIDE, CALCIUM CHLORIDE 600; 310; 30; 20 MG/100ML; MG/100ML; MG/100ML; MG/100ML
100 INJECTION, SOLUTION INTRAVENOUS CONTINUOUS PRN
Status: DISCONTINUED | OUTPATIENT
Start: 2023-03-07 | End: 2023-03-07 | Stop reason: HOSPADM

## 2023-03-07 RX ORDER — FENTANYL CITRATE 50 UG/ML
25 INJECTION, SOLUTION INTRAMUSCULAR; INTRAVENOUS
Status: DISCONTINUED | OUTPATIENT
Start: 2023-03-07 | End: 2023-03-07 | Stop reason: HOSPADM

## 2023-03-07 RX ADMIN — ACETAMINOPHEN 1000 MG: 500 TABLET, FILM COATED ORAL at 08:06

## 2023-03-07 RX ADMIN — PROPOFOL 200 MG: 10 INJECTION, EMULSION INTRAVENOUS at 08:38

## 2023-03-07 RX ADMIN — KETOROLAC TROMETHAMINE 15 MG: 30 INJECTION, SOLUTION INTRAMUSCULAR; INTRAVENOUS at 08:56

## 2023-03-07 RX ADMIN — FENTANYL CITRATE 25 MCG: 50 INJECTION INTRAMUSCULAR; INTRAVENOUS at 08:58

## 2023-03-07 RX ADMIN — Medication 2 G: at 08:44

## 2023-03-07 RX ADMIN — ONDANSETRON 4 MG: 2 INJECTION INTRAMUSCULAR; INTRAVENOUS at 08:44

## 2023-03-07 RX ADMIN — FENTANYL CITRATE 50 MCG: 50 INJECTION INTRAMUSCULAR; INTRAVENOUS at 08:44

## 2023-03-07 RX ADMIN — LIDOCAINE HYDROCHLORIDE 100 MG: 20 INJECTION, SOLUTION EPIDURAL; INFILTRATION; INTRACAUDAL; PERINEURAL at 08:38

## 2023-03-07 RX ADMIN — FENTANYL CITRATE 25 MCG: 50 INJECTION INTRAMUSCULAR; INTRAVENOUS at 08:50

## 2023-03-07 RX ADMIN — SODIUM CHLORIDE, POTASSIUM CHLORIDE, SODIUM LACTATE AND CALCIUM CHLORIDE 100 ML/HR: 600; 310; 30; 20 INJECTION, SOLUTION INTRAVENOUS at 06:59

## 2023-03-07 RX ADMIN — DEXAMETHASONE SODIUM PHOSPHATE 8 MG: 4 INJECTION, SOLUTION INTRA-ARTICULAR; INTRALESIONAL; INTRAMUSCULAR; INTRAVENOUS; SOFT TISSUE at 08:44

## 2023-03-07 NOTE — ANESTHESIA PREPROCEDURE EVALUATION
Anesthesia Evaluation     Patient summary reviewed   no history of anesthetic complications:  NPO Solid Status: > 8 hours             Airway   Mallampati: II  Dental      Pulmonary    (+) sleep apnea on CPAP,   Cardiovascular   Exercise tolerance: excellent (>7 METS)    (+) hypertension, CAD, CABG, dysrhythmias Atrial Fib, hyperlipidemia,   (-) angina      Neuro/Psych- negative ROS  GI/Hepatic/Renal/Endo    (+) obesity,  GERD,      Musculoskeletal     Abdominal    Substance History      OB/GYN          Other                          Anesthesia Plan    ASA 3     general     intravenous induction     Anesthetic plan, risks, benefits, and alternatives have been provided, discussed and informed consent has been obtained with: patient.        CODE STATUS:

## 2023-03-07 NOTE — ANESTHESIA POSTPROCEDURE EVALUATION
Patient: Pawel Thompson    Procedure Summary     Date: 03/07/23 Room / Location: [facility] OR  /  [facility] OR    Anesthesia Start: 0836 Anesthesia Stop:     Procedure: LEFT MIDDLE TRIGGER FINGER RELEASE (Left: Fingers) Diagnosis:       Trigger finger, left middle finger      (M65.332)    Surgeons: Warren Houser MD Provider: Marcia Mendez CRNA    Anesthesia Type: general ASA Status: 3          Anesthesia Type: general    Vitals  Vitals Value Taken Time   /63 03/07/23 0929   Temp 97.4 °F (36.3 °C) 03/07/23 0929   Pulse 57 03/07/23 0930   Resp 16 03/07/23 0929   SpO2 97 % 03/07/23 0930   Vitals shown include unvalidated device data.        Post Anesthesia Care and Evaluation    Patient location during evaluation: PACU  Patient participation: complete - patient participated  Level of consciousness: awake and alert  Pain management: adequate    Airway patency: patent  Anesthetic complications: No anesthetic complications    Cardiovascular status: acceptable  Respiratory status: acceptable  Hydration status: acceptable    Comments: Blood pressure 134/70, pulse 51, temperature 97.4 °F (36.3 °C), temperature source Temporal, resp. rate 16, SpO2 97 %.    Pt discharged from PACU based on manolo score >8  No anesthesia care post op

## 2023-03-07 NOTE — BRIEF OP NOTE
FINGER TRIGGER RELEASE  Progress Note    Pawel Thompson  3/7/2023    Pre-op Diagnosis:   M65.332       Post-Op Diagnosis Codes:     * Trigger finger, left middle finger [M65.332]    Procedure/CPT® Codes:  WA TENDON SHEATH INCISION [53485]      Procedure(s):  LEFT MIDDLE TRIGGER FINGER RELEASE        Surgeon(s):  Warren Houser MD    Anesthesia: General    Staff:   Circulator: Ysabel Mason RN  Scrub Person: Tony Brunson; Janel Jewell         Estimated Blood Loss: minimal    Urine Voided: * No values recorded between 3/7/2023  8:35 AM and 3/7/2023  8:53 AM *    Specimens:                None          Drains: * No LDAs found *    Findings: see op note         Complications: none          Warren Houser MD     Date: 3/7/2023  Time: 09:01 CST

## 2023-03-07 NOTE — H&P
Pt Name: Pawel Thompson  MRN: [id number]  YOB: 1955  Date of evaluation: 3/7/2023    H&P including current review of systems was updated in the paper chart and/or the document previously scanned into the record.  There have been no significant changes or new problems since the original evaluation.  The patient's problems continue and indications for contemplated procedure have not changed.    Electronically signed by Warren Houser MD on 3/7/2023 at 07:44 CST

## 2023-03-07 NOTE — ANESTHESIA PROCEDURE NOTES
Airway  Urgency: elective    Date/Time: 3/7/2023 8:39 AM  Airway not difficult    General Information and Staff    Patient location during procedure: OR  CRNA/CAA: Marcia Mendez CRNA    Indications and Patient Condition  Indications for airway management: airway protection    Preoxygenated: yes  Mask difficulty assessment: 1 - vent by mask    Final Airway Details  Final airway type: supraglottic airway      Successful airway: I-gel  Size 4     Number of attempts at approach: 1  Assessment: lips, teeth, and gum same as pre-op

## 2023-03-18 DIAGNOSIS — I10 ESSENTIAL HYPERTENSION: ICD-10-CM

## 2023-03-20 RX ORDER — LOSARTAN POTASSIUM 25 MG/1
TABLET ORAL
Qty: 90 TABLET | Refills: 0 | Status: SHIPPED | OUTPATIENT
Start: 2023-03-20

## 2023-04-07 DIAGNOSIS — K21.9 GASTROESOPHAGEAL REFLUX DISEASE, UNSPECIFIED WHETHER ESOPHAGITIS PRESENT: ICD-10-CM

## 2023-04-10 RX ORDER — OMEPRAZOLE 20 MG/1
CAPSULE, DELAYED RELEASE ORAL
Qty: 60 CAPSULE | Refills: 11 | Status: SHIPPED | OUTPATIENT
Start: 2023-04-10

## 2023-04-28 ENCOUNTER — PREP FOR SURGERY (OUTPATIENT)
Dept: GASTROENTEROLOGY | Facility: CLINIC | Age: 68
End: 2023-04-28
Payer: MEDICARE

## 2023-04-28 DIAGNOSIS — K21.9 GASTROESOPHAGEAL REFLUX DISEASE, UNSPECIFIED WHETHER ESOPHAGITIS PRESENT: Primary | ICD-10-CM

## 2023-05-08 PROBLEM — K21.9 GASTROESOPHAGEAL REFLUX DISEASE: Status: ACTIVE | Noted: 2023-05-08

## 2023-05-12 DIAGNOSIS — I10 ESSENTIAL HYPERTENSION: ICD-10-CM

## 2023-05-12 RX ORDER — ATENOLOL 25 MG/1
TABLET ORAL
Qty: 90 TABLET | Refills: 3 | Status: SHIPPED | OUTPATIENT
Start: 2023-05-12

## 2023-05-24 ENCOUNTER — TELEPHONE (OUTPATIENT)
Dept: GASTROENTEROLOGY | Facility: CLINIC | Age: 68
End: 2023-05-24
Payer: MEDICARE

## 2023-05-24 NOTE — TELEPHONE ENCOUNTER
----- Message from Tato Lorenzana MD sent at 5/10/2023  9:11 AM CDT -----  Low risk for procedure. Ok to hold anticoagulation for 48hrs. No lovenox indicated  ----- Message -----  From: Ramsey Malone MA  Sent: 5/9/2023   8:17 AM CDT  To: Tato Lorenzana MD                                                                                                                                          [address], [address]  Located within [facility] [address]  Phone: [phone]  Fax: [phone]            May 9, 2023               Dear Dr. Lorenzana,      Pawel Thompson 1955 is being considered for EGD for reflux. Pawel Thompson is tentatively scheduled for 6/27/23.  In order to do this procedure, we will need the patient to be off of Eliquis for 2 days.       If this patient will need Lovenox therapy while off regularly prescribed anticoagulation therapy we ask you please arrange this with the patient.     Lovenox Requirement  Yes/No     Other:           Please specify patient’s risk of cardiac complications during perioperative period by _____% or  LOW  / MEDIUM  /  HIGH RISK.     Approved By:     Date:                 Thank you,            Juan Luis Young M.D.

## 2023-07-24 DIAGNOSIS — R05.1 ACUTE COUGH: Primary | ICD-10-CM

## 2023-07-24 RX ORDER — GUAIFENESIN AND CODEINE PHOSPHATE 100; 10 MG/5ML; MG/5ML
5 SOLUTION ORAL 2 TIMES DAILY PRN
Qty: 120 ML | Refills: 0 | Status: SHIPPED | OUTPATIENT
Start: 2023-07-24 | End: 2023-08-05

## 2023-08-29 DIAGNOSIS — Z12.5 ENCOUNTER FOR SCREENING FOR MALIGNANT NEOPLASM OF PROSTATE: ICD-10-CM

## 2023-08-29 DIAGNOSIS — E78.00 PURE HYPERCHOLESTEROLEMIA: ICD-10-CM

## 2023-08-29 DIAGNOSIS — I10 ESSENTIAL HYPERTENSION: ICD-10-CM

## 2023-08-29 DIAGNOSIS — R97.20 ELEVATED PSA: ICD-10-CM

## 2023-08-29 DIAGNOSIS — R73.9 HYPERGLYCEMIA: ICD-10-CM

## 2023-08-29 LAB
ALBUMIN SERPL-MCNC: 4.4 G/DL (ref 3.5–5.2)
ALP SERPL-CCNC: 46 U/L (ref 40–130)
ALT SERPL-CCNC: 27 U/L (ref 5–41)
ANION GAP SERPL CALCULATED.3IONS-SCNC: 10 MMOL/L (ref 7–19)
AST SERPL-CCNC: 20 U/L (ref 5–40)
BILIRUB SERPL-MCNC: 0.5 MG/DL (ref 0.2–1.2)
BUN SERPL-MCNC: 10 MG/DL (ref 8–23)
CALCIUM SERPL-MCNC: 9.8 MG/DL (ref 8.8–10.2)
CHLORIDE SERPL-SCNC: 103 MMOL/L (ref 98–111)
CHOLEST SERPL-MCNC: 148 MG/DL (ref 160–199)
CO2 SERPL-SCNC: 26 MMOL/L (ref 22–29)
CREAT SERPL-MCNC: 0.9 MG/DL (ref 0.5–1.2)
ERYTHROCYTE [DISTWIDTH] IN BLOOD BY AUTOMATED COUNT: 12.7 % (ref 11.5–14.5)
GLUCOSE SERPL-MCNC: 104 MG/DL (ref 74–109)
HBA1C MFR BLD: 5.4 % (ref 4–6)
HCT VFR BLD AUTO: 43.1 % (ref 42–52)
HDLC SERPL-MCNC: 63 MG/DL (ref 55–121)
HGB BLD-MCNC: 14.2 G/DL (ref 14–18)
LDLC SERPL CALC-MCNC: 63 MG/DL
MCH RBC QN AUTO: 29.6 PG (ref 27–31)
MCHC RBC AUTO-ENTMCNC: 32.9 G/DL (ref 33–37)
MCV RBC AUTO: 89.8 FL (ref 80–94)
PLATELET # BLD AUTO: 180 K/UL (ref 130–400)
PMV BLD AUTO: 11.2 FL (ref 9.4–12.4)
POTASSIUM SERPL-SCNC: 4.8 MMOL/L (ref 3.5–5)
PROT SERPL-MCNC: 6.9 G/DL (ref 6.6–8.7)
PSA SERPL-MCNC: 1 NG/ML (ref 0–4)
RBC # BLD AUTO: 4.8 M/UL (ref 4.7–6.1)
SODIUM SERPL-SCNC: 139 MMOL/L (ref 136–145)
TRIGL SERPL-MCNC: 111 MG/DL (ref 0–149)
TSH SERPL DL<=0.005 MIU/L-ACNC: 2.65 UIU/ML (ref 0.27–4.2)
WBC # BLD AUTO: 5.8 K/UL (ref 4.8–10.8)

## 2023-09-05 SDOH — HEALTH STABILITY: PHYSICAL HEALTH: ON AVERAGE, HOW MANY MINUTES DO YOU ENGAGE IN EXERCISE AT THIS LEVEL?: 40 MIN

## 2023-09-05 SDOH — HEALTH STABILITY: PHYSICAL HEALTH: ON AVERAGE, HOW MANY DAYS PER WEEK DO YOU ENGAGE IN MODERATE TO STRENUOUS EXERCISE (LIKE A BRISK WALK)?: 5 DAYS

## 2023-09-05 SDOH — ECONOMIC STABILITY: HOUSING INSECURITY
IN THE LAST 12 MONTHS, WAS THERE A TIME WHEN YOU DID NOT HAVE A STEADY PLACE TO SLEEP OR SLEPT IN A SHELTER (INCLUDING NOW)?: NO

## 2023-09-05 SDOH — ECONOMIC STABILITY: TRANSPORTATION INSECURITY
IN THE PAST 12 MONTHS, HAS LACK OF TRANSPORTATION KEPT YOU FROM MEETINGS, WORK, OR FROM GETTING THINGS NEEDED FOR DAILY LIVING?: NO

## 2023-09-05 SDOH — ECONOMIC STABILITY: INCOME INSECURITY: HOW HARD IS IT FOR YOU TO PAY FOR THE VERY BASICS LIKE FOOD, HOUSING, MEDICAL CARE, AND HEATING?: NOT VERY HARD

## 2023-09-05 SDOH — ECONOMIC STABILITY: FOOD INSECURITY: WITHIN THE PAST 12 MONTHS, THE FOOD YOU BOUGHT JUST DIDN'T LAST AND YOU DIDN'T HAVE MONEY TO GET MORE.: NEVER TRUE

## 2023-09-05 SDOH — ECONOMIC STABILITY: FOOD INSECURITY: WITHIN THE PAST 12 MONTHS, YOU WORRIED THAT YOUR FOOD WOULD RUN OUT BEFORE YOU GOT MONEY TO BUY MORE.: NEVER TRUE

## 2023-09-05 ASSESSMENT — PATIENT HEALTH QUESTIONNAIRE - PHQ9
1. LITTLE INTEREST OR PLEASURE IN DOING THINGS: 0
SUM OF ALL RESPONSES TO PHQ QUESTIONS 1-9: 0
2. FEELING DOWN, DEPRESSED OR HOPELESS: 0
SUM OF ALL RESPONSES TO PHQ QUESTIONS 1-9: 0
SUM OF ALL RESPONSES TO PHQ QUESTIONS 1-9: 0
SUM OF ALL RESPONSES TO PHQ9 QUESTIONS 1 & 2: 0
SUM OF ALL RESPONSES TO PHQ QUESTIONS 1-9: 0

## 2023-09-05 ASSESSMENT — LIFESTYLE VARIABLES
HOW OFTEN DO YOU HAVE A DRINK CONTAINING ALCOHOL: 3
HOW MANY STANDARD DRINKS CONTAINING ALCOHOL DO YOU HAVE ON A TYPICAL DAY: 1 OR 2
HOW MANY STANDARD DRINKS CONTAINING ALCOHOL DO YOU HAVE ON A TYPICAL DAY: 1
HOW OFTEN DO YOU HAVE SIX OR MORE DRINKS ON ONE OCCASION: 1
HOW OFTEN DO YOU HAVE A DRINK CONTAINING ALCOHOL: 2-4 TIMES A MONTH

## 2023-09-08 ENCOUNTER — TRANSCRIBE ORDERS (OUTPATIENT)
Dept: ADMINISTRATIVE | Facility: HOSPITAL | Age: 68
End: 2023-09-08
Payer: MEDICARE

## 2023-09-08 ENCOUNTER — OFFICE VISIT (OUTPATIENT)
Dept: INTERNAL MEDICINE | Age: 68
End: 2023-09-08
Payer: MEDICARE

## 2023-09-08 VITALS
HEART RATE: 51 BPM | DIASTOLIC BLOOD PRESSURE: 70 MMHG | HEIGHT: 69 IN | OXYGEN SATURATION: 95 % | SYSTOLIC BLOOD PRESSURE: 132 MMHG | WEIGHT: 218.4 LBS | BODY MASS INDEX: 32.35 KG/M2

## 2023-09-08 DIAGNOSIS — I10 ESSENTIAL HYPERTENSION: ICD-10-CM

## 2023-09-08 DIAGNOSIS — G47.33 OSA (OBSTRUCTIVE SLEEP APNEA): ICD-10-CM

## 2023-09-08 DIAGNOSIS — I48.0 PAROXYSMAL ATRIAL FIBRILLATION (HCC): ICD-10-CM

## 2023-09-08 DIAGNOSIS — M79.641 RIGHT HAND PAIN: ICD-10-CM

## 2023-09-08 DIAGNOSIS — R09.89 OTHER SPECIFIED SYMPTOMS AND SIGNS INVOLVING THE CIRCULATORY AND RESPIRATORY SYSTEMS: ICD-10-CM

## 2023-09-08 DIAGNOSIS — E78.00 PURE HYPERCHOLESTEROLEMIA: ICD-10-CM

## 2023-09-08 DIAGNOSIS — I25.10 CORONARY ARTERY DISEASE INVOLVING NATIVE CORONARY ARTERY OF NATIVE HEART WITHOUT ANGINA PECTORIS: Primary | ICD-10-CM

## 2023-09-08 DIAGNOSIS — Z00.00 MEDICARE ANNUAL WELLNESS VISIT, SUBSEQUENT: Primary | ICD-10-CM

## 2023-09-08 DIAGNOSIS — I25.10 CORONARY ARTERY DISEASE INVOLVING NATIVE CORONARY ARTERY OF NATIVE HEART WITHOUT ANGINA PECTORIS: ICD-10-CM

## 2023-09-08 DIAGNOSIS — Z12.5 SCREENING FOR PROSTATE CANCER: ICD-10-CM

## 2023-09-08 PROCEDURE — 1123F ACP DISCUSS/DSCN MKR DOCD: CPT | Performed by: INTERNAL MEDICINE

## 2023-09-08 PROCEDURE — G0439 PPPS, SUBSEQ VISIT: HCPCS | Performed by: INTERNAL MEDICINE

## 2023-09-08 PROCEDURE — 3074F SYST BP LT 130 MM HG: CPT | Performed by: INTERNAL MEDICINE

## 2023-09-08 PROCEDURE — 3017F COLORECTAL CA SCREEN DOC REV: CPT | Performed by: INTERNAL MEDICINE

## 2023-09-08 PROCEDURE — 1036F TOBACCO NON-USER: CPT | Performed by: INTERNAL MEDICINE

## 2023-09-08 PROCEDURE — 99213 OFFICE O/P EST LOW 20 MIN: CPT | Performed by: INTERNAL MEDICINE

## 2023-09-08 PROCEDURE — G8417 CALC BMI ABV UP PARAM F/U: HCPCS | Performed by: INTERNAL MEDICINE

## 2023-09-08 PROCEDURE — G8427 DOCREV CUR MEDS BY ELIG CLIN: HCPCS | Performed by: INTERNAL MEDICINE

## 2023-09-08 PROCEDURE — 3078F DIAST BP <80 MM HG: CPT | Performed by: INTERNAL MEDICINE

## 2023-09-08 RX ORDER — MELOXICAM 15 MG/1
15 TABLET ORAL DAILY PRN
Qty: 30 TABLET | Refills: 0 | Status: SHIPPED | OUTPATIENT
Start: 2023-09-08

## 2023-09-08 RX ORDER — LOSARTAN POTASSIUM 25 MG/1
TABLET ORAL
Qty: 90 TABLET | Refills: 3 | Status: SHIPPED | OUTPATIENT
Start: 2023-09-08

## 2023-09-08 ASSESSMENT — ENCOUNTER SYMPTOMS
EYE DISCHARGE: 0
ABDOMINAL PAIN: 0
EYE REDNESS: 0
BACK PAIN: 0
SINUS PRESSURE: 0
COUGH: 0
ABDOMINAL DISTENTION: 0
SHORTNESS OF BREATH: 0

## 2023-09-08 ASSESSMENT — PATIENT HEALTH QUESTIONNAIRE - PHQ9
SUM OF ALL RESPONSES TO PHQ QUESTIONS 1-9: 0
2. FEELING DOWN, DEPRESSED OR HOPELESS: 0
1. LITTLE INTEREST OR PLEASURE IN DOING THINGS: 0
SUM OF ALL RESPONSES TO PHQ QUESTIONS 1-9: 0
SUM OF ALL RESPONSES TO PHQ9 QUESTIONS 1 & 2: 0

## 2023-09-08 NOTE — PROGRESS NOTES
Chief Complaint   Patient presents with    Medicare AWV     Needs script for oxygen supplies       HPI: Patient is here today for Medicare wellness visit and to follow-up coronary artery disease paroxysmal atrial fibrillation hypertension hyperlipidemia obstructive sleep apnea and other medical problems he feels well he was fishing and fell and injured his right hand which orthopedic Gore still having some discomfort more than usual in that right hand no fractures were seen he does have some underlying arthritis.     Past Medical History:   Diagnosis Date    Atrial fibrillation ([address])     Coronary artery disease     Essential hypertension     GERD (gastroesophageal reflux disease)     Hyperlipidemia     MICHELLE (obstructive sleep apnea) 1/28/2020       Past Surgical History:   Procedure Laterality Date    APPENDECTOMY      CORONARY ARTERY BYPASS GRAFT      PROSTATE SURGERY         Family History   Problem Relation Age of Onset    Prostate Cancer Father     Coronary Art Dis Other     High Cholesterol Other        Social History     Socioeconomic History    Marital status:      Spouse name: Not on file    Number of children: Not on file    Years of education: Not on file    Highest education level: Not on file   Occupational History    Not on file   Tobacco Use    Smoking status: Never    Smokeless tobacco: Never   Vaping Use    Vaping Use: Never used   Substance and Sexual Activity    Alcohol use: Yes     Comment: rare    Drug use: Never    Sexual activity: Not on file   Other Topics Concern    Not on file   Social History Narrative    Not on file     Social Determinants of Health     Financial Resource Strain: Low Risk  (9/8/2022)    Overall Financial Resource Strain (CARDIA)     Difficulty of Paying Living Expenses: Not hard at all   Food Insecurity: No Food Insecurity (9/8/2022)    Hunger Vital Sign     Worried About Running Out of Food in the Last Year: Never true     Ran Out of Food in the Last Year: Never

## 2023-09-13 ENCOUNTER — TRANSCRIBE ORDERS (OUTPATIENT)
Dept: ADMINISTRATIVE | Facility: HOSPITAL | Age: 68
End: 2023-09-13
Payer: MEDICARE

## 2023-09-13 DIAGNOSIS — I10 ESSENTIAL HYPERTENSION: ICD-10-CM

## 2023-09-13 DIAGNOSIS — I25.10 CORONARY ARTERY DISEASE INVOLVING NATIVE CORONARY ARTERY OF NATIVE HEART WITHOUT ANGINA PECTORIS: ICD-10-CM

## 2023-09-13 DIAGNOSIS — R09.89 OTHER SPECIFIED SYMPTOMS AND SIGNS INVOLVING THE CIRCULATORY AND RESPIRATORY SYSTEMS: Primary | ICD-10-CM

## 2023-09-14 DIAGNOSIS — E78.00 PURE HYPERCHOLESTEROLEMIA: ICD-10-CM

## 2023-09-14 RX ORDER — SIMVASTATIN 40 MG
TABLET ORAL
Qty: 90 TABLET | Refills: 3 | Status: SHIPPED | OUTPATIENT
Start: 2023-09-14

## 2023-09-19 DIAGNOSIS — N40.1 BENIGN PROSTATIC HYPERPLASIA WITH URINARY OBSTRUCTION: ICD-10-CM

## 2023-09-19 DIAGNOSIS — N13.8 BENIGN PROSTATIC HYPERPLASIA WITH URINARY OBSTRUCTION: ICD-10-CM

## 2023-09-19 DIAGNOSIS — N40.1 BENIGN PROSTATIC HYPERPLASIA WITH LOWER URINARY TRACT SYMPTOMS, SYMPTOM DETAILS UNSPECIFIED: ICD-10-CM

## 2023-09-19 RX ORDER — TAMSULOSIN HYDROCHLORIDE 0.4 MG/1
CAPSULE ORAL
Qty: 90 CAPSULE | Refills: 3 | Status: SHIPPED | OUTPATIENT
Start: 2023-09-19

## 2023-09-19 RX ORDER — FINASTERIDE 5 MG/1
TABLET, FILM COATED ORAL
Qty: 90 TABLET | Refills: 3 | Status: SHIPPED | OUTPATIENT
Start: 2023-09-19

## 2023-09-27 ENCOUNTER — HOSPITAL ENCOUNTER (OUTPATIENT)
Dept: NON INVASIVE DIAGNOSTICS | Age: 68
Discharge: HOME | End: 2023-09-27
Payer: MEDICARE

## 2023-09-27 DIAGNOSIS — I25.10 CORONARY ARTERY DISEASE INVOLVING NATIVE CORONARY ARTERY OF NATIVE HEART WITHOUT ANGINA PECTORIS: ICD-10-CM

## 2023-09-27 DIAGNOSIS — R09.89 OTHER SPECIFIED SYMPTOMS AND SIGNS INVOLVING THE CIRCULATORY AND RESPIRATORY SYSTEMS: ICD-10-CM

## 2023-09-27 DIAGNOSIS — I10 ESSENTIAL HYPERTENSION: ICD-10-CM

## 2023-09-27 PROCEDURE — 93880 EXTRACRANIAL BILAT STUDY: CPT

## 2023-11-03 NOTE — PROGRESS NOTES
Subjective    Mr. Thompson is 68 y.o. male    Chief Complaint: BPH    History of Present Illness    68-year-old male established patient on Eliquis follow-up for enlarged prostate on finasteride and tamsulosin.  His LUTS are currently controlled on combination therapy.  PSA this year remains normal 1.00 adjusted to 2.0 for finasteride effect.  He reports a family history of prostate cancer.  He does have difficulty obtaining and maintaining erections but desires no treatment at this time.  UA today is clear.  KUB x-ray done today reviewed by me with patient shows no evidence for kidney stone.  He denies flank pain or hematuria.     I independently visualized and reviewed the patient's prior imaging studies today in clinic and discussed the imaging findings with the patient.      Lab Results   Component Value Date    PSA 1.00 08/29/2023    PSA 1.370 10/31/2022    PSA 0.947 11/03/2021       The following portions of the patient's history were reviewed and updated as appropriate: allergies, current medications, past family history, past medical history, past social history, past surgical history and problem list.    Review of Systems      Current Outpatient Medications:     atenolol (TENORMIN) 25 MG tablet, Take 1 tablet by mouth Every Night., Disp: , Rfl:     Eliquis 5 MG tablet tablet, TAKE 1 TABLET BY MOUTH EVERY 12 HOURS., Disp: 60 tablet, Rfl: 11    finasteride (PROSCAR) 5 MG tablet, TAKE 1 TABLET DAILY, Disp: 90 tablet, Rfl: 3    losartan (COZAAR) 25 MG tablet, Take 1 tablet by mouth Every Night., Disp: , Rfl:     Omega-3 Fatty Acids (fish oil) 1000 MG capsule capsule, Take 1 capsule by mouth Every Night., Disp: , Rfl:     omeprazole (priLOSEC) 20 MG capsule, TAKE (1) CAPSULE BY MOUTH TWICE DAILY BEFORE MEALS, Disp: 60 capsule, Rfl: 11    PATIENT SUPPLIED MEDICATION, Every Night. 4 pills nightly, Disp: , Rfl:     simvastatin (ZOCOR) 40 MG tablet, Take 1 tablet by mouth Every Night., Disp: , Rfl:     tamsulosin  (FLOMAX) 0.4 MG capsule 24 hr capsule, TAKE 1 CAPSULE EVERY NIGHT, Disp: 90 capsule, Rfl: 3    Past Medical History:   Diagnosis Date    Acid reflux     Arthritis     Benign prostatic hyperplasia with lower urinary tract symptoms 12/06/2017    Coronary artery disease     COVID-19     Elevated PSA 12/06/2016    Hyperlipidemia     Hypertension     New onset atrial fibrillation 08/31/2019    Sleep apnea     does not wear machine       Past Surgical History:   Procedure Laterality Date    APPENDECTOMY      CARDIAC CATHETERIZATION      COLONOSCOPY  09/11/2012    Tics repeat exam in 10 years    COLONOSCOPY N/A 12/1/2022    Procedure: COLONOSCOPY WITH ANESTHESIA;  Surgeon: Juan Luis Young MD;  Location: [facility] ENDOSCOPY;  Service: Gastroenterology;  Laterality: N/A;  Pre: screen  Post: normal  Randi Parks MD    CORONARY ARTERY BYPASS GRAFT      3 bypasses    ENDOSCOPY N/A 12/1/2022    Procedure: ESOPHAGOGASTRODUODENOSCOPY WITH ANESTHESIA;  Surgeon: Juan Luis Young MD;  Location: [facility] ENDOSCOPY;  Service: Gastroenterology;  Laterality: N/A;  Pre: screen  Post: nml  Randi Parks MD    ENDOSCOPY N/A 6/27/2023    Procedure: ESOPHAGOGASTRODUODENOSCOPY WITH ANESTHESIA;  Surgeon: Juan Luis Young MD;  Location: [facility] ENDOSCOPY;  Service: Gastroenterology;  Laterality: N/A;  preop: Gastroesophageal reflux disease  post op: hiatal hernia  PCP:Randi Parks MD    TRIGGER FINGER RELEASE Left 3/7/2023    Procedure: LEFT MIDDLE TRIGGER FINGER RELEASE;  Surgeon: Warren Houser MD;  Location: [facility] OR;  Service: Orthopedics;  Laterality: Left;       Social History     Socioeconomic History    Marital status:    Tobacco Use    Smoking status: Never    Smokeless tobacco: Never   Vaping Use    Vaping Use: Never used   Substance and Sexual Activity    Alcohol use: Yes     Alcohol/week: 1.0 standard drink of alcohol     Types: 1 Cans of beer per week     Comment: occasionally    Drug use: Never    Sexual activity:  "Defer       Family History   Problem Relation Age of Onset    Heart disease Mother     Stroke Mother     Prostate cancer Father     Heart disease Father     Heart attack Brother     Heart disease Brother     No Known Problems Brother     No Known Problems Maternal Grandmother     No Known Problems Maternal Grandfather     Heart disease Paternal Grandmother     No Known Problems Paternal Grandfather     Colon cancer Neg Hx     Colon polyps Neg Hx        Objective    Temp 98.1 °F (36.7 °C)   Ht 175.3 cm (69\")   Wt 99.6 kg (219 lb 9.6 oz)   BMI 32.43 kg/m²     Physical Exam        Results for orders placed or performed in visit on 11/15/23   POC Urinalysis Dipstick, Multipro    Specimen: Urine   Result Value Ref Range    Color Yellow Yellow, Straw, Dark Yellow, [place]    Clarity, UA Clear Clear    Glucose, UA Negative Negative mg/dL    Bilirubin Negative Negative    Ketones, UA Negative Negative    Specific Gravity  1.015 1.005 - 1.030    Blood, UA Negative Negative    pH, Urine 6.5 5.0 - 8.0    Protein, POC Negative Negative mg/dL    Urobilinogen, UA Normal Normal, 0.2 E.U./dL    Nitrite, UA Negative Negative    Leukocytes Negative Negative     IPSS Questionnaire (AUA-7):  Incomplete emptying  Over the past month, how often have you had a sensation of not emptying your bladder completely after you finished urinating?: About half the time (11/15/23 1346)  Frequency  Over the past month, how often have you had to urinate again less than two hours after you finishied urinating ?: About half the time (11/15/23 1346)  Intermittency  Over the past month, how often have you found you stopped and started again several time when you urinated ?: Less than 1 time in 5 (11/15/23 1346)  Urgency  Over the last month, how often have you found it difficult  have you found it difficult to postpone urination ?: More than half the time (11/15/23 1346)  Weak Stream  Over the past month, how often have you had a weak urinary stream ?: " Less than half the time (11/15/23 1346)  Straining  Over the past month, how often have you had to push or strain to begin urination ?: Not at all (11/15/23 1346)  Nocturia  Over the past month, how many times did you most typically get up to urinate from the time you went to bed until the time you got up in the morning ?: 2 times (11/15/23 1346)  Quality of life due to urinary symptoms  If you were to spend the rest of your life with your urinary condition the way it is now, how would feel about that?: Mixed - about equally satisfied (11/15/23 1346)    Scores  Total IPSS Score: (!) 15 (11/15/23 1346)  Total Score = Symptomatic Level: Moderately symptomatic: 8-19 (11/15/23 1346)        Assessment and Plan    Diagnoses and all orders for this visit:    1. Benign prostatic hyperplasia with urinary obstruction (Primary)  -     POC Urinalysis Dipstick, Multipro    2. History of kidney stones  -     XR Abdomen KUB      Normal PSA.  No evidence for kidney stone on KUB.  LUTS well-controlled on combination therapy.  Continue finasteride and tamsulosin.  Follow-up in 1 year or sooner as needed.      This document has been signed by DASHAWN Velazquez MD on November 15, 2023 21:21 CST

## 2023-11-15 ENCOUNTER — OFFICE VISIT (OUTPATIENT)
Dept: UROLOGY | Facility: CLINIC | Age: 68
End: 2023-11-15
Payer: MEDICARE

## 2023-11-15 ENCOUNTER — HOSPITAL ENCOUNTER (OUTPATIENT)
Dept: GENERAL RADIOLOGY | Facility: HOSPITAL | Age: 68
Discharge: HOME | End: 2023-11-15
Admitting: UROLOGY
Payer: MEDICARE

## 2023-11-15 VITALS — TEMPERATURE: 98.1 F | BODY MASS INDEX: 32.53 KG/M2 | WEIGHT: 219.6 LBS | HEIGHT: 69 IN

## 2023-11-15 DIAGNOSIS — N40.1 BENIGN PROSTATIC HYPERPLASIA WITH URINARY OBSTRUCTION: Primary | ICD-10-CM

## 2023-11-15 DIAGNOSIS — Z87.442 HISTORY OF KIDNEY STONES: ICD-10-CM

## 2023-11-15 DIAGNOSIS — N13.8 BENIGN PROSTATIC HYPERPLASIA WITH URINARY OBSTRUCTION: Primary | ICD-10-CM

## 2023-11-15 PROCEDURE — 74018 RADEX ABDOMEN 1 VIEW: CPT

## 2023-11-15 NOTE — LETTER
November 15, 2023     Randi Parks MD  [address] [place] [address] [address]  [place] [address]    Patient: Pawel Thompson   YOB: 1955   Date of Visit: 11/15/2023     Dear Dr. Kasey MD:    Thank you for referring Pawel Thompson to me for evaluation. Below are the relevant portions of my assessment and plan of care.    If you have questions, please do not hesitate to call me. I look forward to following Pawel along with you.         Sincerely,        Floyd Velazquez MD        CC: No Recipients      Progress Notes:  Subjective    Mr. Thompson is 68 y.o. male    Chief Complaint: BPH    History of Present Illness    68-year-old male established patient on Eliquis follow-up for enlarged prostate on finasteride and tamsulosin.  His LUTS are currently controlled on combination therapy.  PSA this year remains normal 1.00 adjusted to 2.0 for finasteride effect.  He reports a family history of prostate cancer.  He does have difficulty obtaining and maintaining erections but desires no treatment at this time.  UA today is clear.  KUB x-ray done today reviewed by me with patient shows no evidence for kidney stone.  He denies flank pain or hematuria.     I independently visualized and reviewed the patient's prior imaging studies today in clinic and discussed the imaging findings with the patient.      Lab Results   Component Value Date    PSA 1.00 08/29/2023    PSA 1.370 10/31/2022    PSA 0.947 11/03/2021       The following portions of the patient's history were reviewed and updated as appropriate: allergies, current medications, past family history, past medical history, past social history, past surgical history and problem list.    Review of Systems      Current Outpatient Medications:   •  atenolol (TENORMIN) 25 MG tablet, Take 1 tablet by mouth Every Night., Disp: , Rfl:   •  Eliquis 5 MG tablet tablet, TAKE 1 TABLET BY MOUTH EVERY 12 HOURS., Disp: 60 tablet, Rfl: 11  •  finasteride (PROSCAR) 5 MG tablet, TAKE 1  TABLET DAILY, Disp: 90 tablet, Rfl: 3  •  losartan (COZAAR) 25 MG tablet, Take 1 tablet by mouth Every Night., Disp: , Rfl:   •  Omega-3 Fatty Acids (fish oil) 1000 MG capsule capsule, Take 1 capsule by mouth Every Night., Disp: , Rfl:   •  omeprazole (priLOSEC) 20 MG capsule, TAKE (1) CAPSULE BY MOUTH TWICE DAILY BEFORE MEALS, Disp: 60 capsule, Rfl: 11  •  PATIENT SUPPLIED MEDICATION, Every Night. 4 pills nightly, Disp: , Rfl:   •  simvastatin (ZOCOR) 40 MG tablet, Take 1 tablet by mouth Every Night., Disp: , Rfl:   •  tamsulosin (FLOMAX) 0.4 MG capsule 24 hr capsule, TAKE 1 CAPSULE EVERY NIGHT, Disp: 90 capsule, Rfl: 3    Past Medical History:   Diagnosis Date   • Acid reflux    • Arthritis    • Benign prostatic hyperplasia with lower urinary tract symptoms 12/06/2017   • Coronary artery disease    • COVID-19    • Elevated PSA 12/06/2016   • Hyperlipidemia    • Hypertension    • New onset atrial fibrillation 08/31/2019   • Sleep apnea     does not wear machine       Past Surgical History:   Procedure Laterality Date   • APPENDECTOMY     • CARDIAC CATHETERIZATION     • COLONOSCOPY  09/11/2012    Tics repeat exam in 10 years   • COLONOSCOPY N/A 12/1/2022    Procedure: COLONOSCOPY WITH ANESTHESIA;  Surgeon: Juan Luis Young MD;  Location: [facility] ENDOSCOPY;  Service: Gastroenterology;  Laterality: N/A;  Pre: screen  Post: normal  Randi Parks MD   • CORONARY ARTERY BYPASS GRAFT      3 bypasses   • ENDOSCOPY N/A 12/1/2022    Procedure: ESOPHAGOGASTRODUODENOSCOPY WITH ANESTHESIA;  Surgeon: Juan Luis Young MD;  Location: [facility] ENDOSCOPY;  Service: Gastroenterology;  Laterality: N/A;  Pre: screen  Post: nml  Randi Parks MD   • ENDOSCOPY N/A 6/27/2023    Procedure: ESOPHAGOGASTRODUODENOSCOPY WITH ANESTHESIA;  Surgeon: Juan Luis Young MD;  Location: [facility] ENDOSCOPY;  Service: Gastroenterology;  Laterality: N/A;  preop: Gastroesophageal reflux disease  post op: hiatal hernia  PCP:Randi Parks MD   • TRIGGER FINGER  "RELEASE Left 3/7/2023    Procedure: LEFT MIDDLE TRIGGER FINGER RELEASE;  Surgeon: Warren Houser MD;  Location: [facility] OR;  Service: Orthopedics;  Laterality: Left;       Social History     Socioeconomic History   • Marital status:    Tobacco Use   • Smoking status: Never   • Smokeless tobacco: Never   Vaping Use   • Vaping Use: Never used   Substance and Sexual Activity   • Alcohol use: Yes     Alcohol/week: 1.0 standard drink of alcohol     Types: 1 Cans of beer per week     Comment: occasionally   • Drug use: Never   • Sexual activity: Defer       Family History   Problem Relation Age of Onset   • Heart disease Mother    • Stroke Mother    • Prostate cancer Father    • Heart disease Father    • Heart attack Brother    • Heart disease Brother    • No Known Problems Brother    • No Known Problems Maternal Grandmother    • No Known Problems Maternal Grandfather    • Heart disease Paternal Grandmother    • No Known Problems Paternal Grandfather    • Colon cancer Neg Hx    • Colon polyps Neg Hx        Objective    Temp 98.1 °F (36.7 °C)   Ht 175.3 cm (69\")   Wt 99.6 kg (219 lb 9.6 oz)   BMI 32.43 kg/m²     Physical Exam        Results for orders placed or performed in visit on 11/15/23   POC Urinalysis Dipstick, Multipro    Specimen: Urine   Result Value Ref Range    Color Yellow Yellow, Straw, Dark Yellow, [place]    Clarity, UA Clear Clear    Glucose, UA Negative Negative mg/dL    Bilirubin Negative Negative    Ketones, UA Negative Negative    Specific Gravity  1.015 1.005 - 1.030    Blood, UA Negative Negative    pH, Urine 6.5 5.0 - 8.0    Protein, POC Negative Negative mg/dL    Urobilinogen, UA Normal Normal, 0.2 E.U./dL    Nitrite, UA Negative Negative    Leukocytes Negative Negative     IPSS Questionnaire (AUA-7):  Incomplete emptying  Over the past month, how often have you had a sensation of not emptying your bladder completely after you finished urinating?: About half the time (11/15/23 " 1346)  Frequency  Over the past month, how often have you had to urinate again less than two hours after you finishied urinating ?: About half the time (11/15/23 1346)  Intermittency  Over the past month, how often have you found you stopped and started again several time when you urinated ?: Less than 1 time in 5 (11/15/23 1346)  Urgency  Over the last month, how often have you found it difficult  have you found it difficult to postpone urination ?: More than half the time (11/15/23 1346)  Weak Stream  Over the past month, how often have you had a weak urinary stream ?: Less than half the time (11/15/23 1346)  Straining  Over the past month, how often have you had to push or strain to begin urination ?: Not at all (11/15/23 1346)  Nocturia  Over the past month, how many times did you most typically get up to urinate from the time you went to bed until the time you got up in the morning ?: 2 times (11/15/23 1346)  Quality of life due to urinary symptoms  If you were to spend the rest of your life with your urinary condition the way it is now, how would feel about that?: Mixed - about equally satisfied (11/15/23 1346)    Scores  Total IPSS Score: (!) 15 (11/15/23 1346)  Total Score = Symptomatic Level: Moderately symptomatic: 8-19 (11/15/23 1346)        Assessment and Plan    Diagnoses and all orders for this visit:    1. Benign prostatic hyperplasia with urinary obstruction (Primary)  -     POC Urinalysis Dipstick, Multipro    2. History of kidney stones  -     XR Abdomen KUB      Normal PSA.  No evidence for kidney stone on KUB.  LUTS well-controlled on combination therapy.  Continue finasteride and tamsulosin.  Follow-up in 1 year or sooner as needed.      This document has been signed by DASHAWN Velazquez MD on November 15, 2023 21:21 CST

## 2023-11-27 ENCOUNTER — TELEPHONE (OUTPATIENT)
Dept: UROLOGY | Facility: CLINIC | Age: 68
End: 2023-11-27
Payer: MEDICARE

## 2023-11-27 DIAGNOSIS — I10 ESSENTIAL HYPERTENSION: ICD-10-CM

## 2023-11-27 DIAGNOSIS — E78.00 PURE HYPERCHOLESTEROLEMIA: ICD-10-CM

## 2023-11-27 DIAGNOSIS — N40.1 BENIGN PROSTATIC HYPERPLASIA WITH LOWER URINARY TRACT SYMPTOMS, SYMPTOM DETAILS UNSPECIFIED: ICD-10-CM

## 2023-11-27 DIAGNOSIS — N13.8 BENIGN PROSTATIC HYPERPLASIA WITH URINARY OBSTRUCTION: ICD-10-CM

## 2023-11-27 DIAGNOSIS — N40.1 BENIGN PROSTATIC HYPERPLASIA WITH URINARY OBSTRUCTION: ICD-10-CM

## 2023-11-27 RX ORDER — SIMVASTATIN 40 MG
40 TABLET ORAL NIGHTLY
Qty: 90 TABLET | Refills: 3 | Status: SHIPPED | OUTPATIENT
Start: 2023-11-27

## 2023-11-27 RX ORDER — TAMSULOSIN HYDROCHLORIDE 0.4 MG/1
0.4 CAPSULE ORAL DAILY
Qty: 90 CAPSULE | Refills: 3 | Status: SHIPPED | OUTPATIENT
Start: 2023-11-27

## 2023-11-27 RX ORDER — LOSARTAN POTASSIUM 25 MG/1
TABLET ORAL
Qty: 90 TABLET | Refills: 3 | Status: SHIPPED | OUTPATIENT
Start: 2023-11-27

## 2023-11-27 RX ORDER — OMEPRAZOLE 20 MG/1
20 CAPSULE, DELAYED RELEASE ORAL DAILY
Qty: 90 CAPSULE | Refills: 3 | Status: SHIPPED | OUTPATIENT
Start: 2023-11-27

## 2023-11-27 RX ORDER — ATENOLOL 25 MG/1
25 TABLET ORAL DAILY
Qty: 90 TABLET | Refills: 3 | Status: SHIPPED | OUTPATIENT
Start: 2023-11-27

## 2023-11-27 RX ORDER — FINASTERIDE 5 MG/1
5 TABLET, FILM COATED ORAL DAILY
Qty: 90 TABLET | Refills: 3 | Status: SHIPPED | OUTPATIENT
Start: 2023-11-27

## 2023-11-27 RX ORDER — TAMSULOSIN HYDROCHLORIDE 0.4 MG/1
1 CAPSULE ORAL NIGHTLY
Qty: 90 CAPSULE | Refills: 3 | Status: SHIPPED | OUTPATIENT
Start: 2023-11-27

## 2023-11-27 RX ORDER — MELOXICAM 15 MG/1
15 TABLET ORAL DAILY PRN
Qty: 30 TABLET | Refills: 0 | Status: SHIPPED | OUTPATIENT
Start: 2023-11-27

## 2023-11-27 NOTE — TELEPHONE ENCOUNTER
Patient called stating he had changed insurance plans and was needing his medication sent to a different pharmacy. I got his medication sent to [facility] and a 90 day supply.

## 2023-12-01 ENCOUNTER — OFFICE VISIT (OUTPATIENT)
Dept: CARDIOLOGY | Facility: CLINIC | Age: 68
End: 2023-12-01
Payer: MEDICARE

## 2023-12-01 VITALS
OXYGEN SATURATION: 98 % | DIASTOLIC BLOOD PRESSURE: 80 MMHG | SYSTOLIC BLOOD PRESSURE: 130 MMHG | HEIGHT: 69 IN | BODY MASS INDEX: 32.58 KG/M2 | WEIGHT: 220 LBS | HEART RATE: 65 BPM

## 2023-12-01 DIAGNOSIS — E78.2 MIXED HYPERLIPIDEMIA: ICD-10-CM

## 2023-12-01 DIAGNOSIS — I10 PRIMARY HYPERTENSION: ICD-10-CM

## 2023-12-01 DIAGNOSIS — R06.09 DOE (DYSPNEA ON EXERTION): ICD-10-CM

## 2023-12-01 DIAGNOSIS — I48.0 PAF (PAROXYSMAL ATRIAL FIBRILLATION): ICD-10-CM

## 2023-12-01 DIAGNOSIS — I25.810 CORONARY ARTERY DISEASE INVOLVING CORONARY BYPASS GRAFT OF NATIVE HEART WITHOUT ANGINA PECTORIS: Primary | ICD-10-CM

## 2023-12-01 PROCEDURE — 3075F SYST BP GE 130 - 139MM HG: CPT | Performed by: INTERNAL MEDICINE

## 2023-12-01 PROCEDURE — 3079F DIAST BP 80-89 MM HG: CPT | Performed by: INTERNAL MEDICINE

## 2023-12-01 PROCEDURE — 1160F RVW MEDS BY RX/DR IN RCRD: CPT | Performed by: INTERNAL MEDICINE

## 2023-12-01 PROCEDURE — 1159F MED LIST DOCD IN RCRD: CPT | Performed by: INTERNAL MEDICINE

## 2023-12-01 PROCEDURE — 99214 OFFICE O/P EST MOD 30 MIN: CPT | Performed by: INTERNAL MEDICINE

## 2023-12-01 PROCEDURE — 93000 ELECTROCARDIOGRAM COMPLETE: CPT | Performed by: INTERNAL MEDICINE

## 2023-12-01 NOTE — PROGRESS NOTES
Referring Provider: Randi Parks MD    Reason for Follow-up Visit: CAD    Subjective .   Chief Complaint:   Chief Complaint   Patient presents with    Atrial Fibrillation     Yearly  pt states he has been doing good with no feelings of symptoms.    Congestive Heart Failure     Pt states he has had some issues with sob going up steps.  No chest pain.    Hypertension     Pt states this is good.    Hyperlipidemia     Last lab done 8/2023 LDL was 63 on Zocor 40 mg       History of present illness:  Pawel Thompson is a 68 y.o. yo male with CAD, s/p CABG in the past in for routine follow up. He denies chest pain but complains of worsening AREVALO.       History  Past Medical History:   Diagnosis Date    Acid reflux     Arthritis     Benign prostatic hyperplasia with lower urinary tract symptoms 12/06/2017    Coronary artery disease     COVID-19     Elevated PSA 12/06/2016    Hyperlipidemia     Hypertension     New onset atrial fibrillation 08/31/2019    Sleep apnea     does not wear machine   ,   Past Surgical History:   Procedure Laterality Date    APPENDECTOMY      CARDIAC CATHETERIZATION      COLONOSCOPY  09/11/2012    Tics repeat exam in 10 years    COLONOSCOPY N/A 12/1/2022    Procedure: COLONOSCOPY WITH ANESTHESIA;  Surgeon: Juan Luis Young MD;  Location: [facility] ENDOSCOPY;  Service: Gastroenterology;  Laterality: N/A;  Pre: screen  Post: normal  Randi Parks MD    CORONARY ARTERY BYPASS GRAFT      3 bypasses    ENDOSCOPY N/A 12/1/2022    Procedure: ESOPHAGOGASTRODUODENOSCOPY WITH ANESTHESIA;  Surgeon: Juan Luis Young MD;  Location: [facility] ENDOSCOPY;  Service: Gastroenterology;  Laterality: N/A;  Pre: screen  Post: nml  Randi Parks MD    ENDOSCOPY N/A 6/27/2023    Procedure: ESOPHAGOGASTRODUODENOSCOPY WITH ANESTHESIA;  Surgeon: Juan Luis Young MD;  Location: [facility] ENDOSCOPY;  Service: Gastroenterology;  Laterality: N/A;  preop: Gastroesophageal reflux disease  post op: hiatal hernia  PCP:Randi Parks MD     TRIGGER FINGER RELEASE Left 3/7/2023    Procedure: LEFT MIDDLE TRIGGER FINGER RELEASE;  Surgeon: Warren Houser MD;  Location:  [facility] OR;  Service: Orthopedics;  Laterality: Left;   ,   Family History   Problem Relation Age of Onset    Heart disease Mother     Stroke Mother     Prostate cancer Father     Heart disease Father     Heart attack Brother     Heart disease Brother     No Known Problems Brother     No Known Problems Maternal Grandmother     No Known Problems Maternal Grandfather     Heart disease Paternal Grandmother     No Known Problems Paternal Grandfather     Colon cancer Neg Hx     Colon polyps Neg Hx    ,   Social History     Tobacco Use    Smoking status: Never    Smokeless tobacco: Never   Vaping Use    Vaping Use: Never used   Substance Use Topics    Alcohol use: Yes     Alcohol/week: 1.0 standard drink of alcohol     Types: 1 Cans of beer per week     Comment: occasionally    Drug use: Never   ,     Medications  Current Outpatient Medications   Medication Sig Dispense Refill    atenolol (TENORMIN) 25 MG tablet Take 1 tablet by mouth Every Night.      Eliquis 5 MG tablet tablet TAKE 1 TABLET BY MOUTH EVERY 12 HOURS. 60 tablet 11    finasteride (PROSCAR) 5 MG tablet Take 1 tablet by mouth Daily. 90 tablet 3    losartan (COZAAR) 25 MG tablet Take 1 tablet by mouth Every Night.      Omega-3 Fatty Acids (fish oil) 1000 MG capsule capsule Take 1 capsule by mouth Every Night.      omeprazole (priLOSEC) 20 MG capsule TAKE (1) CAPSULE BY MOUTH TWICE DAILY BEFORE MEALS 60 capsule 11    PATIENT SUPPLIED MEDICATION Every Night. 4 pills nightly      simvastatin (ZOCOR) 40 MG tablet Take 1 tablet by mouth Every Night.      tamsulosin (FLOMAX) 0.4 MG capsule 24 hr capsule Take 1 capsule by mouth Every Night. 90 capsule 3     No current facility-administered medications for this visit.       Allergies:  Ramipril    Review of Systems  Review of Systems   HENT:  Negative for nosebleeds.    Cardiovascular:   "Positive for dyspnea on exertion. Negative for chest pain, claudication, leg swelling, near-syncope, orthopnea, palpitations, paroxysmal nocturnal dyspnea and syncope.   Respiratory:  Negative for hemoptysis and shortness of breath.    Gastrointestinal:  Negative for melena.   Genitourinary:  Negative for hematuria.       Objective     Physical Exam:  /80   Pulse 65   Ht 175.3 cm (69\")   Wt 99.8 kg (220 lb)   SpO2 98%   BMI 32.49 kg/m²   Vitals reviewed.   Pulmonary:      Effort: Pulmonary effort is normal.      Breath sounds: Normal breath sounds.   Cardiovascular:      Normal rate. Regular rhythm.      Murmurs: There is no murmur.   Edema:     Peripheral edema absent.         Results Review:    ECG 12 Lead    Date/Time: 12/1/2023 9:33 AM  Performed by: Tato Lorenzana MD    Authorized by: Tato Lorenzana MD  Comparison: compared with previous ECG   Similar to previous ECG  Rhythm: sinus rhythm  Ectopy: atrial premature contractions  Rate: normal  Conduction: conduction normal  ST Segments: ST segments normal  T Waves: T waves normal  QRS axis: normal    Clinical impression: non-specific ECG          Office Visit on 11/15/2023   Component Date Value Ref Range Status    Color 11/15/2023 Yellow  Yellow, Straw, Dark Yellow, [place] Final    Clarity, UA 11/15/2023 Clear  Clear Final    Glucose, UA 11/15/2023 Negative  Negative mg/dL Final    Bilirubin 11/15/2023 Negative  Negative Final    Ketones, UA 11/15/2023 Negative  Negative Final    Specific Gravity  11/15/2023 1.015  1.005 - 1.030 Final    Blood, UA 11/15/2023 Negative  Negative Final    pH, Urine 11/15/2023 6.5  5.0 - 8.0 Final    Protein, POC 11/15/2023 Negative  Negative mg/dL Final    Urobilinogen, UA 11/15/2023 Normal  Normal, 0.2 E.U./dL Final    Nitrite, UA 11/15/2023 Negative  Negative Final    Leukocytes 11/15/2023 Negative  Negative Final       Assessment & Plan   Problem List Items Addressed This Visit          Cardiac and Vasculature    PAF " (paroxysmal atrial fibrillation)    Current Assessment & Plan     No recent documented recurrence but still having PAC's and occ asymptomatic PSVT         Coronary artery disease involving coronary bypass graft of native heart without angina pectoris    Current Assessment & Plan     Will get a lexiscan sestamibi to see if his AREVALO is an anginal equivalent         Primary hypertension - Primary    Current Assessment & Plan     Adequate control         Mixed hyperlipidemia    Current Assessment & Plan     Adequate response to treatment            Medical Complexity  must have 2 out of 3     Moderate Complexity Level 4           1 of the following medical problems:          []One chronic illness with mild exacerbation         [x]Two or more stable chronic illness          []One new problem  []One acute illness with systemic symptoms    Complexity of Data  Reviewed (1 out of the 3 following categories)      Category 1 tests, documents, historian (must have 3 points)       []Review of prior external records  []Review of results of unique tests  [x]Ordering unique tests  []Assessment requires an independent historian    Category 2 Interpretation of tests   []Independent interpretation of test read by another doc    Category 3 Discuss Management/tests  []Discussion with external physician    Risk of complications and/or morbidity          [x]Prescription Drug Management

## 2023-12-13 RX ORDER — APIXABAN 5 MG/1
TABLET, FILM COATED ORAL
Qty: 60 TABLET | Refills: 1 | Status: SHIPPED | OUTPATIENT
Start: 2023-12-13

## 2024-01-03 ENCOUNTER — HOSPITAL ENCOUNTER (OUTPATIENT)
Dept: CARDIOLOGY | Facility: HOSPITAL | Age: 69
Discharge: HOME | End: 2024-01-03
Payer: MEDICARE

## 2024-01-03 VITALS
WEIGHT: 220.46 LBS | HEIGHT: 69 IN | SYSTOLIC BLOOD PRESSURE: 140 MMHG | BODY MASS INDEX: 32.65 KG/M2 | DIASTOLIC BLOOD PRESSURE: 72 MMHG | HEART RATE: 50 BPM

## 2024-01-03 DIAGNOSIS — R06.09 DOE (DYSPNEA ON EXERTION): ICD-10-CM

## 2024-01-03 PROCEDURE — A9502 TC99M TETROFOSMIN: HCPCS | Performed by: INTERNAL MEDICINE

## 2024-01-03 PROCEDURE — 0 TECHNETIUM TETROFOSMIN KIT: Performed by: INTERNAL MEDICINE

## 2024-01-03 PROCEDURE — 93017 CV STRESS TEST TRACING ONLY: CPT

## 2024-01-03 PROCEDURE — 78452 HT MUSCLE IMAGE SPECT MULT: CPT

## 2024-01-03 PROCEDURE — 25010000002 REGADENOSON 0.4 MG/5ML SOLUTION: Performed by: INTERNAL MEDICINE

## 2024-01-03 RX ORDER — REGADENOSON 0.08 MG/ML
0.4 INJECTION, SOLUTION INTRAVENOUS ONCE
Status: COMPLETED | OUTPATIENT
Start: 2024-01-03 | End: 2024-01-03

## 2024-01-03 RX ADMIN — TETROFOSMIN 1 DOSE: 1.38 INJECTION, POWDER, LYOPHILIZED, FOR SOLUTION INTRAVENOUS at 08:48

## 2024-01-03 RX ADMIN — REGADENOSON 0.4 MG: 0.08 INJECTION, SOLUTION INTRAVENOUS at 08:46

## 2024-01-03 RX ADMIN — TETROFOSMIN 1 DOSE: 1.38 INJECTION, POWDER, LYOPHILIZED, FOR SOLUTION INTRAVENOUS at 07:15

## 2024-01-04 ENCOUNTER — TELEPHONE (OUTPATIENT)
Dept: CARDIOLOGY | Facility: CLINIC | Age: 69
End: 2024-01-04
Payer: MEDICARE

## 2024-01-04 ENCOUNTER — PREP FOR SURGERY (OUTPATIENT)
Dept: OTHER | Facility: HOSPITAL | Age: 69
End: 2024-01-04
Payer: MEDICARE

## 2024-01-04 DIAGNOSIS — I25.810 CORONARY ARTERY DISEASE INVOLVING CORONARY BYPASS GRAFT OF NATIVE HEART WITHOUT ANGINA PECTORIS: Primary | ICD-10-CM

## 2024-01-04 LAB
BH CV REST NUCLEAR ISOTOPE DOSE: 10.7 MCI
BH CV STRESS BP STAGE 1: NORMAL
BH CV STRESS COMMENTS STAGE 1: NORMAL
BH CV STRESS DOSE REGADENOSON STAGE 1: 0.4
BH CV STRESS DURATION MIN STAGE 1: 0
BH CV STRESS DURATION SEC STAGE 1: 10
BH CV STRESS HR STAGE 1: 68
BH CV STRESS NUCLEAR ISOTOPE DOSE: 35.8 MCI
BH CV STRESS PROTOCOL 1: NORMAL
BH CV STRESS RECOVERY BP: NORMAL MMHG
BH CV STRESS RECOVERY HR: 58 BPM
BH CV STRESS STAGE 1: 1
LV EF NUC BP: 68 %
MAXIMAL PREDICTED HEART RATE: 150 BPM
PERCENT MAX PREDICTED HR: 45.33 %
STRESS BASELINE BP: NORMAL MMHG
STRESS BASELINE HR: 50 BPM
STRESS PERCENT HR: 53 %
STRESS POST EXERCISE DUR MIN: 0 MIN
STRESS POST EXERCISE DUR SEC: 10 SEC
STRESS POST PEAK BP: NORMAL MMHG
STRESS POST PEAK HR: 68 BPM
STRESS TARGET HR: 128 BPM

## 2024-01-04 NOTE — TELEPHONE ENCOUNTER
"CALL PLACED TO PT NOTIFYING HIM THAT HIS STRESS TEST CAME \" intermediate risk \" & THAT DR CLIFFORD HAD PLACED ORDERS TODAY FOR A HEART CATH.    PT VERBALIZED UNDERSTANDING  "

## 2024-01-05 ENCOUNTER — TELEPHONE (OUTPATIENT)
Dept: CARDIOLOGY | Facility: CLINIC | Age: 69
End: 2024-01-05
Payer: MEDICARE

## 2024-01-05 NOTE — TELEPHONE ENCOUNTER
Pt informed and he already knew about getting the heart cath.  He stated understanding.  Rigo Carrasco, JOSIA[facility]

## 2024-01-05 NOTE — PROGRESS NOTES
Pawel Thompson informed of results.  Pawel Thompson stated understanding. Rigo Carrasco, [facility]

## 2024-01-05 NOTE — H&P (VIEW-ONLY)
Pawel Thompson informed of results.  Pawel Thompson stated understanding. Rigo Carrasco, [facility]

## 2024-01-05 NOTE — TELEPHONE ENCOUNTER
----- Message from Tato Lorenzana MD sent at 1/4/2024  9:31 AM CST -----  Positive for ischemia. Will schedule cath next Thursday

## 2024-01-11 ENCOUNTER — HOSPITAL ENCOUNTER (OUTPATIENT)
Facility: HOSPITAL | Age: 69
Setting detail: HOSPITAL OUTPATIENT SURGERY
Discharge: HOME | End: 2024-01-11
Attending: INTERNAL MEDICINE | Admitting: INTERNAL MEDICINE
Payer: MEDICARE

## 2024-01-11 ENCOUNTER — PREP FOR SURGERY (OUTPATIENT)
Dept: OTHER | Facility: HOSPITAL | Age: 69
End: 2024-01-11
Payer: MEDICARE

## 2024-01-11 VITALS
DIASTOLIC BLOOD PRESSURE: 65 MMHG | TEMPERATURE: 97.6 F | OXYGEN SATURATION: 98 % | SYSTOLIC BLOOD PRESSURE: 134 MMHG | RESPIRATION RATE: 16 BRPM | HEART RATE: 55 BPM | HEIGHT: 69 IN | BODY MASS INDEX: 31.99 KG/M2 | WEIGHT: 216 LBS

## 2024-01-11 DIAGNOSIS — I25.700 CORONARY ARTERY DISEASE INVOLVING CORONARY BYPASS GRAFT OF NATIVE HEART WITH UNSTABLE ANGINA PECTORIS: Primary | ICD-10-CM

## 2024-01-11 DIAGNOSIS — I25.810 CORONARY ARTERY DISEASE INVOLVING CORONARY BYPASS GRAFT OF NATIVE HEART WITHOUT ANGINA PECTORIS: ICD-10-CM

## 2024-01-11 LAB
ALBUMIN SERPL-MCNC: 4.8 G/DL (ref 3.5–5.2)
ALBUMIN/GLOB SERPL: 1.9 G/DL
ALP SERPL-CCNC: 45 U/L (ref 39–117)
ALT SERPL W P-5'-P-CCNC: 21 U/L (ref 1–41)
ANION GAP SERPL CALCULATED.3IONS-SCNC: 9 MMOL/L (ref 5–15)
AST SERPL-CCNC: 19 U/L (ref 1–40)
BASOPHILS # BLD AUTO: 0.03 10*3/MM3 (ref 0–0.2)
BASOPHILS NFR BLD AUTO: 0.4 % (ref 0–1.5)
BILIRUB SERPL-MCNC: 0.4 MG/DL (ref 0–1.2)
BUN SERPL-MCNC: 21 MG/DL (ref 8–23)
BUN/CREAT SERPL: 21.2 (ref 7–25)
CALCIUM SPEC-SCNC: 9.4 MG/DL (ref 8.6–10.5)
CHLORIDE SERPL-SCNC: 103 MMOL/L (ref 98–107)
CO2 SERPL-SCNC: 26 MMOL/L (ref 22–29)
CREAT SERPL-MCNC: 0.99 MG/DL (ref 0.76–1.27)
DEPRECATED RDW RBC AUTO: 41.3 FL (ref 37–54)
EGFRCR SERPLBLD CKD-EPI 2021: 81.9 ML/MIN/1.73
EOSINOPHIL # BLD AUTO: 0.2 10*3/MM3 (ref 0–0.4)
EOSINOPHIL NFR BLD AUTO: 2.4 % (ref 0.3–6.2)
ERYTHROCYTE [DISTWIDTH] IN BLOOD BY AUTOMATED COUNT: 13.2 % (ref 12.3–15.4)
GLOBULIN UR ELPH-MCNC: 2.5 GM/DL
GLUCOSE SERPL-MCNC: 104 MG/DL (ref 65–99)
HCT VFR BLD AUTO: 43.4 % (ref 37.5–51)
HGB BLD-MCNC: 14.1 G/DL (ref 13–17.7)
IMM GRANULOCYTES # BLD AUTO: 0.03 10*3/MM3 (ref 0–0.05)
IMM GRANULOCYTES NFR BLD AUTO: 0.4 % (ref 0–0.5)
LYMPHOCYTES # BLD AUTO: 1.99 10*3/MM3 (ref 0.7–3.1)
LYMPHOCYTES NFR BLD AUTO: 23.4 % (ref 19.6–45.3)
MCH RBC QN AUTO: 28.3 PG (ref 26.6–33)
MCHC RBC AUTO-ENTMCNC: 32.5 G/DL (ref 31.5–35.7)
MCV RBC AUTO: 87.1 FL (ref 79–97)
MONOCYTES # BLD AUTO: 0.77 10*3/MM3 (ref 0.1–0.9)
MONOCYTES NFR BLD AUTO: 9 % (ref 5–12)
NEUTROPHILS NFR BLD AUTO: 5.49 10*3/MM3 (ref 1.7–7)
NEUTROPHILS NFR BLD AUTO: 64.4 % (ref 42.7–76)
NRBC BLD AUTO-RTO: 0 /100 WBC (ref 0–0.2)
PLATELET # BLD AUTO: 220 10*3/MM3 (ref 140–450)
PMV BLD AUTO: 10.6 FL (ref 6–12)
POTASSIUM SERPL-SCNC: 4.7 MMOL/L (ref 3.5–5.2)
PROT SERPL-MCNC: 7.3 G/DL (ref 6–8.5)
RBC # BLD AUTO: 4.98 10*6/MM3 (ref 4.14–5.8)
SODIUM SERPL-SCNC: 138 MMOL/L (ref 136–145)
WBC NRBC COR # BLD AUTO: 8.51 10*3/MM3 (ref 3.4–10.8)

## 2024-01-11 PROCEDURE — C1769 GUIDE WIRE: HCPCS | Performed by: INTERNAL MEDICINE

## 2024-01-11 PROCEDURE — 80053 COMPREHEN METABOLIC PANEL: CPT | Performed by: INTERNAL MEDICINE

## 2024-01-11 PROCEDURE — 92937 PRQ TRLUML REVSC CAB GRF 1: CPT | Performed by: INTERNAL MEDICINE

## 2024-01-11 PROCEDURE — 25510000001 IOPAMIDOL PER 1 ML: Performed by: INTERNAL MEDICINE

## 2024-01-11 PROCEDURE — 99152 MOD SED SAME PHYS/QHP 5/>YRS: CPT | Performed by: INTERNAL MEDICINE

## 2024-01-11 PROCEDURE — C1894 INTRO/SHEATH, NON-LASER: HCPCS | Performed by: INTERNAL MEDICINE

## 2024-01-11 PROCEDURE — 25010000002 DIPHENHYDRAMINE PER 50 MG: Performed by: INTERNAL MEDICINE

## 2024-01-11 PROCEDURE — 25010000002 HEPARIN (PORCINE) 2000-0.9 UNIT/L-% SOLUTION: Performed by: INTERNAL MEDICINE

## 2024-01-11 PROCEDURE — 93459 L HRT ART/GRFT ANGIO: CPT | Performed by: INTERNAL MEDICINE

## 2024-01-11 PROCEDURE — C1760 CLOSURE DEV, VASC: HCPCS | Performed by: INTERNAL MEDICINE

## 2024-01-11 PROCEDURE — 25010000002 MIDAZOLAM PER 1 MG: Performed by: INTERNAL MEDICINE

## 2024-01-11 PROCEDURE — 99153 MOD SED SAME PHYS/QHP EA: CPT | Performed by: INTERNAL MEDICINE

## 2024-01-11 PROCEDURE — 25010000002 FENTANYL CITRATE (PF) 50 MCG/ML SOLUTION: Performed by: INTERNAL MEDICINE

## 2024-01-11 PROCEDURE — 85025 COMPLETE CBC W/AUTO DIFF WBC: CPT | Performed by: INTERNAL MEDICINE

## 2024-01-11 PROCEDURE — C1887 CATHETER, GUIDING: HCPCS | Performed by: INTERNAL MEDICINE

## 2024-01-11 PROCEDURE — 25010000002 HEPARIN (PORCINE) PER 1000 UNITS: Performed by: INTERNAL MEDICINE

## 2024-01-11 PROCEDURE — 25010000002 BIVALIRUDIN TRIFLUOROACETATE 250 MG RECONSTITUTED SOLUTION: Performed by: INTERNAL MEDICINE

## 2024-01-11 PROCEDURE — 25010000002 HEPARIN (PORCINE) 1000-0.9 UT/500ML-% SOLUTION: Performed by: INTERNAL MEDICINE

## 2024-01-11 RX ORDER — HYDROCODONE BITARTRATE AND ACETAMINOPHEN 5; 325 MG/1; MG/1
1 TABLET ORAL EVERY 4 HOURS PRN
Status: DISCONTINUED | OUTPATIENT
Start: 2024-01-11 | End: 2024-01-11 | Stop reason: HOSPADM

## 2024-01-11 RX ORDER — MIDAZOLAM HYDROCHLORIDE 1 MG/ML
INJECTION INTRAMUSCULAR; INTRAVENOUS
Status: DISCONTINUED | OUTPATIENT
Start: 2024-01-11 | End: 2024-01-11 | Stop reason: HOSPADM

## 2024-01-11 RX ORDER — HEPARIN SODIUM 200 [USP'U]/100ML
INJECTION, SOLUTION INTRAVENOUS
Status: DISCONTINUED | OUTPATIENT
Start: 2024-01-11 | End: 2024-01-11 | Stop reason: HOSPADM

## 2024-01-11 RX ORDER — LIDOCAINE HYDROCHLORIDE 20 MG/ML
INJECTION, SOLUTION INFILTRATION; PERINEURAL
Status: DISCONTINUED | OUTPATIENT
Start: 2024-01-11 | End: 2024-01-11 | Stop reason: HOSPADM

## 2024-01-11 RX ORDER — FENTANYL CITRATE 50 UG/ML
INJECTION, SOLUTION INTRAMUSCULAR; INTRAVENOUS
Status: DISCONTINUED | OUTPATIENT
Start: 2024-01-11 | End: 2024-01-11 | Stop reason: HOSPADM

## 2024-01-11 RX ORDER — HEPARIN SODIUM 1000 [USP'U]/ML
INJECTION, SOLUTION INTRAVENOUS; SUBCUTANEOUS
Status: DISCONTINUED | OUTPATIENT
Start: 2024-01-11 | End: 2024-01-11 | Stop reason: HOSPADM

## 2024-01-11 RX ORDER — SODIUM CHLORIDE 9 MG/ML
125 INJECTION, SOLUTION INTRAVENOUS CONTINUOUS
Status: DISPENSED | OUTPATIENT
Start: 2024-01-11 | End: 2024-01-11

## 2024-01-11 RX ORDER — ACETAMINOPHEN 325 MG/1
650 TABLET ORAL EVERY 4 HOURS PRN
Status: DISCONTINUED | OUTPATIENT
Start: 2024-01-11 | End: 2024-01-11 | Stop reason: HOSPADM

## 2024-01-11 RX ORDER — DIPHENHYDRAMINE HYDROCHLORIDE 50 MG/ML
INJECTION INTRAMUSCULAR; INTRAVENOUS
Status: DISCONTINUED | OUTPATIENT
Start: 2024-01-11 | End: 2024-01-11 | Stop reason: HOSPADM

## 2024-01-11 RX ORDER — NITROGLYCERIN 0.4 MG/1
0.4 TABLET SUBLINGUAL
Status: DISCONTINUED | OUTPATIENT
Start: 2024-01-11 | End: 2024-01-11 | Stop reason: HOSPADM

## 2024-01-11 NOTE — Clinical Note
"Interval HPI:   Overnight events: No acute events overnight. Patient in room 49247/59200 A. Blood glucose stable. BG at and above goal on current insulin regimen (IIP- off drip overnight, low insulin requirements. ). Steroid use- None. 1 Day Post-Op  Renal function- Abnormal - Creatinine 2.0   Vasopressors-  None       Endocrine will continue to follow and manage insulin orders inpatient.       Diet Adult Regular (IDDSI Level 7)     Eatin%  Nausea: No  Hypoglycemia and intervention: No  Fever: No  TPN and/or TF: No      BP (!) 96/53 Comment: Simultaneous filing. User may not have seen previous data.  Pulse (!) 52   Temp 97.5 °F (36.4 °C) (Oral)   Resp 20   Ht 5' 11" (1.803 m)   Wt 87.5 kg (193 lb)   SpO2 95%   BMI 26.92 kg/m²     Labs Reviewed and Include    Recent Labs   Lab 11/10/23  0524 11/10/23  0808   *  --    CALCIUM 7.8*  --    ALBUMIN 3.4*  --    PROT 5.4*  --      --    K 4.8 4.5   CO2 22*  --      --    BUN 36*  --    CREATININE 2.0*  --    ALKPHOS 42*  --    ALT 19  --    *  --    BILITOT 0.4  --      Lab Results   Component Value Date    WBC 9.71 11/10/2023    HGB 12.1 (L) 11/10/2023    HCT 35.9 (L) 11/10/2023    MCV 92 11/10/2023    PLT 84 (L) 11/10/2023     No results for input(s): "TSH", "FREET4" in the last 168 hours.  Lab Results   Component Value Date    HGBA1C 5.6 2023       Nutritional status:   Body mass index is 26.92 kg/m².  Lab Results   Component Value Date    ALBUMIN 3.4 (L) 11/10/2023    ALBUMIN 3.4 (L) 11/10/2023    ALBUMIN 2.9 (L) 2023     No results found for: "PREALBUMIN"    Estimated Creatinine Clearance: 42.4 mL/min (A) (based on SCr of 2 mg/dL (H)).    Accu-Checks  Recent Labs     23  2303 11/10/23  0011 11/10/23  0111 11/10/23  0219 11/10/23  0325 11/10/23  0410 11/10/23  0529 11/10/23  0736 11/10/23  0806   POCTGLUCOSE 105 124* 123* 106 118* 133* 128* 132* 170* 148*       Current Medications and/or " A 6 fr sheath was  inserted into the right femoral artery. Sheath insertion not delayed. Treatments Impacting Glycemic Control  Immunotherapy:    Immunosuppressants       None          Steroids:   Hormones (From admission, onward)      None          Pressors:    Autonomic Drugs (From admission, onward)      None          Hyperglycemia/Diabetes Medications:   Antihyperglycemics (From admission, onward)      Start     Stop Route Frequency Ordered    11/10/23 1214  insulin aspart U-100 pen 0-10 Units         -- SubQ Before meals & nightly PRN 11/10/23 1115

## 2024-01-18 ENCOUNTER — APPOINTMENT (OUTPATIENT)
Dept: CT IMAGING | Facility: HOSPITAL | Age: 69
DRG: 321 | End: 2024-01-18
Payer: MEDICARE

## 2024-01-18 ENCOUNTER — HOSPITAL ENCOUNTER (INPATIENT)
Facility: HOSPITAL | Age: 69
LOS: 2 days | Discharge: HOME | DRG: 321 | End: 2024-01-20
Attending: INTERNAL MEDICINE | Admitting: INTERNAL MEDICINE
Payer: MEDICARE

## 2024-01-18 DIAGNOSIS — I25.700 CORONARY ARTERY DISEASE INVOLVING CORONARY BYPASS GRAFT OF NATIVE HEART WITH UNSTABLE ANGINA PECTORIS: ICD-10-CM

## 2024-01-18 PROBLEM — I20.0 UNSTABLE ANGINA: Status: ACTIVE | Noted: 2024-01-18

## 2024-01-18 LAB
ALBUMIN SERPL-MCNC: 4.7 G/DL (ref 3.5–5.2)
ALBUMIN/GLOB SERPL: 1.8 G/DL
ALP SERPL-CCNC: 45 U/L (ref 39–117)
ALT SERPL W P-5'-P-CCNC: 22 U/L (ref 1–41)
ANION GAP SERPL CALCULATED.3IONS-SCNC: 8 MMOL/L (ref 5–15)
AST SERPL-CCNC: 18 U/L (ref 1–40)
BASOPHILS # BLD AUTO: 0.04 10*3/MM3 (ref 0–0.2)
BASOPHILS NFR BLD AUTO: 0.5 % (ref 0–1.5)
BILIRUB SERPL-MCNC: 0.4 MG/DL (ref 0–1.2)
BUN SERPL-MCNC: 14 MG/DL (ref 8–23)
BUN/CREAT SERPL: 15.4 (ref 7–25)
CALCIUM SPEC-SCNC: 9.8 MG/DL (ref 8.6–10.5)
CHLORIDE SERPL-SCNC: 105 MMOL/L (ref 98–107)
CO2 SERPL-SCNC: 27 MMOL/L (ref 22–29)
CREAT SERPL-MCNC: 0.91 MG/DL (ref 0.76–1.27)
DEPRECATED RDW RBC AUTO: 42.1 FL (ref 37–54)
EGFRCR SERPLBLD CKD-EPI 2021: 90.7 ML/MIN/1.73
EOSINOPHIL # BLD AUTO: 0.16 10*3/MM3 (ref 0–0.4)
EOSINOPHIL NFR BLD AUTO: 2.1 % (ref 0.3–6.2)
ERYTHROCYTE [DISTWIDTH] IN BLOOD BY AUTOMATED COUNT: 13.3 % (ref 12.3–15.4)
GLOBULIN UR ELPH-MCNC: 2.6 GM/DL
GLUCOSE SERPL-MCNC: 104 MG/DL (ref 65–99)
HCT VFR BLD AUTO: 43.7 % (ref 37.5–51)
HGB BLD-MCNC: 14.6 G/DL (ref 13–17.7)
IMM GRANULOCYTES # BLD AUTO: 0.03 10*3/MM3 (ref 0–0.05)
IMM GRANULOCYTES NFR BLD AUTO: 0.4 % (ref 0–0.5)
LYMPHOCYTES # BLD AUTO: 1.99 10*3/MM3 (ref 0.7–3.1)
LYMPHOCYTES NFR BLD AUTO: 25.7 % (ref 19.6–45.3)
MCH RBC QN AUTO: 29.1 PG (ref 26.6–33)
MCHC RBC AUTO-ENTMCNC: 33.4 G/DL (ref 31.5–35.7)
MCV RBC AUTO: 87.2 FL (ref 79–97)
MONOCYTES # BLD AUTO: 0.69 10*3/MM3 (ref 0.1–0.9)
MONOCYTES NFR BLD AUTO: 8.9 % (ref 5–12)
NEUTROPHILS NFR BLD AUTO: 4.84 10*3/MM3 (ref 1.7–7)
NEUTROPHILS NFR BLD AUTO: 62.4 % (ref 42.7–76)
NRBC BLD AUTO-RTO: 0 /100 WBC (ref 0–0.2)
PLATELET # BLD AUTO: 234 10*3/MM3 (ref 140–450)
PMV BLD AUTO: 10.4 FL (ref 6–12)
POTASSIUM SERPL-SCNC: 4.7 MMOL/L (ref 3.5–5.2)
PROT SERPL-MCNC: 7.3 G/DL (ref 6–8.5)
RBC # BLD AUTO: 5.01 10*6/MM3 (ref 4.14–5.8)
SODIUM SERPL-SCNC: 140 MMOL/L (ref 136–145)
WBC NRBC COR # BLD AUTO: 7.75 10*3/MM3 (ref 3.4–10.8)

## 2024-01-18 PROCEDURE — C1769 GUIDE WIRE: HCPCS | Performed by: INTERNAL MEDICINE

## 2024-01-18 PROCEDURE — 25510000001 IOPAMIDOL PER 1 ML: Performed by: INTERNAL MEDICINE

## 2024-01-18 PROCEDURE — C1887 CATHETER, GUIDING: HCPCS | Performed by: INTERNAL MEDICINE

## 2024-01-18 PROCEDURE — C1760 CLOSURE DEV, VASC: HCPCS | Performed by: INTERNAL MEDICINE

## 2024-01-18 PROCEDURE — C1725 CATH, TRANSLUMIN NON-LASER: HCPCS | Performed by: INTERNAL MEDICINE

## 2024-01-18 PROCEDURE — 71275 CT ANGIOGRAPHY CHEST: CPT

## 2024-01-18 PROCEDURE — 63710000001 PROMETHAZINE PER 25 MG: Performed by: INTERNAL MEDICINE

## 2024-01-18 PROCEDURE — 85025 COMPLETE CBC W/AUTO DIFF WBC: CPT | Performed by: INTERNAL MEDICINE

## 2024-01-18 PROCEDURE — 25810000003 SODIUM CHLORIDE 0.9 % SOLUTION: Performed by: INTERNAL MEDICINE

## 2024-01-18 PROCEDURE — 25010000002 DIPHENHYDRAMINE PER 50 MG: Performed by: INTERNAL MEDICINE

## 2024-01-18 PROCEDURE — S0260 H&P FOR SURGERY: HCPCS | Performed by: INTERNAL MEDICINE

## 2024-01-18 PROCEDURE — C1874 STENT, COATED/COV W/DEL SYS: HCPCS | Performed by: INTERNAL MEDICINE

## 2024-01-18 PROCEDURE — 99153 MOD SED SAME PHYS/QHP EA: CPT | Performed by: INTERNAL MEDICINE

## 2024-01-18 PROCEDURE — 99152 MOD SED SAME PHYS/QHP 5/>YRS: CPT | Performed by: INTERNAL MEDICINE

## 2024-01-18 PROCEDURE — 99223 1ST HOSP IP/OBS HIGH 75: CPT | Performed by: THORACIC SURGERY (CARDIOTHORACIC VASCULAR SURGERY)

## 2024-01-18 PROCEDURE — 92943 PRQ TRLUML REVSC CH OCC ANT: CPT | Performed by: INTERNAL MEDICINE

## 2024-01-18 PROCEDURE — 80053 COMPREHEN METABOLIC PANEL: CPT | Performed by: INTERNAL MEDICINE

## 2024-01-18 PROCEDURE — C9607 PERC D-E COR REVASC CHRO SIN: HCPCS | Performed by: INTERNAL MEDICINE

## 2024-01-18 PROCEDURE — 25010000002 BIVALIRUDIN TRIFLUOROACETATE 250 MG RECONSTITUTED SOLUTION: Performed by: INTERNAL MEDICINE

## 2024-01-18 PROCEDURE — 4A023N7 MEASUREMENT OF CARDIAC SAMPLING AND PRESSURE, LEFT HEART, PERCUTANEOUS APPROACH: ICD-10-PCS | Performed by: INTERNAL MEDICINE

## 2024-01-18 PROCEDURE — 93005 ELECTROCARDIOGRAM TRACING: CPT | Performed by: INTERNAL MEDICINE

## 2024-01-18 PROCEDURE — 25010000002 FENTANYL CITRATE (PF) 50 MCG/ML SOLUTION: Performed by: INTERNAL MEDICINE

## 2024-01-18 PROCEDURE — C1894 INTRO/SHEATH, NON-LASER: HCPCS | Performed by: INTERNAL MEDICINE

## 2024-01-18 PROCEDURE — 93010 ELECTROCARDIOGRAM REPORT: CPT | Performed by: INTERNAL MEDICINE

## 2024-01-18 PROCEDURE — B2121ZZ FLUOROSCOPY OF SINGLE CORONARY ARTERY BYPASS GRAFT USING LOW OSMOLAR CONTRAST: ICD-10-PCS | Performed by: INTERNAL MEDICINE

## 2024-01-18 PROCEDURE — 25010000002 MIDAZOLAM PER 1 MG: Performed by: INTERNAL MEDICINE

## 2024-01-18 PROCEDURE — 027036Z DILATION OF CORONARY ARTERY, ONE ARTERY WITH THREE DRUG-ELUTING INTRALUMINAL DEVICES, PERCUTANEOUS APPROACH: ICD-10-PCS | Performed by: INTERNAL MEDICINE

## 2024-01-18 PROCEDURE — B2111ZZ FLUOROSCOPY OF MULTIPLE CORONARY ARTERIES USING LOW OSMOLAR CONTRAST: ICD-10-PCS | Performed by: INTERNAL MEDICINE

## 2024-01-18 DEVICE — EVEROLIMUS-ELUTING PLATINUM CHROMIUM CORONARY STENT SYSTEM
Type: IMPLANTABLE DEVICE | Site: CORONARY | Status: FUNCTIONAL
Brand: SYNERGY™ XD

## 2024-01-18 RX ORDER — DIPHENHYDRAMINE HYDROCHLORIDE 50 MG/ML
INJECTION INTRAMUSCULAR; INTRAVENOUS
Status: DISCONTINUED | OUTPATIENT
Start: 2024-01-18 | End: 2024-01-18 | Stop reason: HOSPADM

## 2024-01-18 RX ORDER — NITROGLYCERIN 0.4 MG/1
0.4 TABLET SUBLINGUAL
Status: DISCONTINUED | OUTPATIENT
Start: 2024-01-18 | End: 2024-01-20 | Stop reason: HOSPADM

## 2024-01-18 RX ORDER — LOSARTAN POTASSIUM 50 MG/1
25 TABLET ORAL NIGHTLY
Status: DISCONTINUED | OUTPATIENT
Start: 2024-01-18 | End: 2024-01-20 | Stop reason: HOSPADM

## 2024-01-18 RX ORDER — OMEPRAZOLE 20 MG/1
20 CAPSULE, DELAYED RELEASE ORAL 2 TIMES DAILY
COMMUNITY

## 2024-01-18 RX ORDER — NITROGLYCERIN 20 MG/100ML
5-200 INJECTION INTRAVENOUS
Status: DISCONTINUED | OUTPATIENT
Start: 2024-01-18 | End: 2024-01-20 | Stop reason: HOSPADM

## 2024-01-18 RX ORDER — FENTANYL CITRATE 50 UG/ML
INJECTION, SOLUTION INTRAMUSCULAR; INTRAVENOUS
Status: DISCONTINUED | OUTPATIENT
Start: 2024-01-18 | End: 2024-01-18 | Stop reason: HOSPADM

## 2024-01-18 RX ORDER — ACETAMINOPHEN 325 MG/1
650 TABLET ORAL EVERY 4 HOURS PRN
Status: DISCONTINUED | OUTPATIENT
Start: 2024-01-18 | End: 2024-01-20 | Stop reason: HOSPADM

## 2024-01-18 RX ORDER — METOPROLOL TARTRATE 1 MG/ML
5 INJECTION, SOLUTION INTRAVENOUS EVERY 6 HOURS PRN
Status: DISCONTINUED | OUTPATIENT
Start: 2024-01-18 | End: 2024-01-20 | Stop reason: HOSPADM

## 2024-01-18 RX ORDER — SODIUM CHLORIDE 9 MG/ML
100 INJECTION, SOLUTION INTRAVENOUS CONTINUOUS
Status: DISCONTINUED | OUTPATIENT
Start: 2024-01-18 | End: 2024-01-20

## 2024-01-18 RX ORDER — ATENOLOL 25 MG/1
25 TABLET ORAL NIGHTLY
Status: DISCONTINUED | OUTPATIENT
Start: 2024-01-18 | End: 2024-01-20 | Stop reason: HOSPADM

## 2024-01-18 RX ORDER — PANTOPRAZOLE SODIUM 40 MG/1
40 TABLET, DELAYED RELEASE ORAL
Status: DISCONTINUED | OUTPATIENT
Start: 2024-01-19 | End: 2024-01-20 | Stop reason: HOSPADM

## 2024-01-18 RX ORDER — TAMSULOSIN HYDROCHLORIDE 0.4 MG/1
0.4 CAPSULE ORAL NIGHTLY
Status: DISCONTINUED | OUTPATIENT
Start: 2024-01-18 | End: 2024-01-20 | Stop reason: HOSPADM

## 2024-01-18 RX ORDER — ATORVASTATIN CALCIUM 40 MG/1
40 TABLET, FILM COATED ORAL NIGHTLY
Status: DISCONTINUED | OUTPATIENT
Start: 2024-01-18 | End: 2024-01-20 | Stop reason: HOSPADM

## 2024-01-18 RX ORDER — PROMETHAZINE HYDROCHLORIDE 25 MG/1
25 TABLET ORAL ONCE
Status: COMPLETED | OUTPATIENT
Start: 2024-01-18 | End: 2024-01-18

## 2024-01-18 RX ORDER — FINASTERIDE 5 MG/1
5 TABLET, FILM COATED ORAL DAILY
Status: DISCONTINUED | OUTPATIENT
Start: 2024-01-18 | End: 2024-01-20 | Stop reason: HOSPADM

## 2024-01-18 RX ORDER — MIDAZOLAM HYDROCHLORIDE 1 MG/ML
INJECTION INTRAMUSCULAR; INTRAVENOUS
Status: DISCONTINUED | OUTPATIENT
Start: 2024-01-18 | End: 2024-01-18 | Stop reason: HOSPADM

## 2024-01-18 RX ORDER — LIDOCAINE HYDROCHLORIDE 20 MG/ML
INJECTION, SOLUTION INFILTRATION; PERINEURAL
Status: DISCONTINUED | OUTPATIENT
Start: 2024-01-18 | End: 2024-01-18 | Stop reason: HOSPADM

## 2024-01-18 RX ADMIN — IOPAMIDOL 100 ML: 755 INJECTION, SOLUTION INTRAVENOUS at 16:38

## 2024-01-18 RX ADMIN — TAMSULOSIN HYDROCHLORIDE 0.4 MG: 0.4 CAPSULE ORAL at 20:39

## 2024-01-18 RX ADMIN — FINASTERIDE 5 MG: 5 TABLET, FILM COATED ORAL at 17:53

## 2024-01-18 RX ADMIN — LOSARTAN POTASSIUM 25 MG: 50 TABLET, FILM COATED ORAL at 20:39

## 2024-01-18 RX ADMIN — PROMETHAZINE HYDROCHLORIDE 25 MG: 25 TABLET ORAL at 22:07

## 2024-01-18 RX ADMIN — METOPROLOL TARTRATE 5 MG: 1 INJECTION, SOLUTION INTRAVENOUS at 18:34

## 2024-01-18 RX ADMIN — SODIUM CHLORIDE 100 ML/HR: 9 INJECTION, SOLUTION INTRAVENOUS at 17:01

## 2024-01-18 RX ADMIN — TICAGRELOR 180 MG: 90 TABLET ORAL at 17:53

## 2024-01-18 RX ADMIN — ATORVASTATIN CALCIUM 40 MG: 40 TABLET, FILM COATED ORAL at 20:39

## 2024-01-18 RX ADMIN — ATENOLOL 25 MG: 25 TABLET ORAL at 20:39

## 2024-01-18 NOTE — Clinical Note
First balloon inflation max pressure = 6 tarah. First balloon inflation duration = 60 seconds. Second inflation of balloon - Max pressure = 6 tarah. 2nd Inflation of balloon - Duration = 15 seconds.

## 2024-01-18 NOTE — Clinical Note
Wire inserted in right coronary artery. Private MD,   Private MD  [facility]  Psychiatry and Primary Care Physician  Phone: (   )    -  Fax: (   )    -    Lul Francis (MD), Psychiatry  [address] [place] [id number]  Phone: [phone]  Fax: [phone]

## 2024-01-18 NOTE — Clinical Note
Patient will to be taken to radiology for CTA. Hematuria was noted and reported to cardiologist. EKG performed and given to cardiologist.

## 2024-01-18 NOTE — Clinical Note
First balloon inflation max pressure = 7 tarah. First balloon inflation duration = 20 seconds. Second inflation of balloon - Max pressure = 10 tarah. 2nd Inflation of balloon - Duration = 30 seconds. Third inflation of balloon - Max pressure = 9 tarah. 3rd Inflation of balloon - Duration = 20 seconds.

## 2024-01-18 NOTE — Clinical Note
First balloon inflation max pressure = 8 tarah. First balloon inflation duration = 15 seconds. Second inflation of balloon - Max pressure = 8 tarah. 2nd Inflation of balloon - Duration = 15 seconds. 2nd inflation was done at 14:32 CST. Third inflation of balloon - Max pressure = 8 tarah. 3rd Inflation of balloon - Duration = 15 seconds. 3rd inflation was done at 14:32 CST.

## 2024-01-18 NOTE — CONSULTS
Referring Provider: Dr. aNte Lorenzana  Reason for Consultation: Aortic dissection    Patient Care Team:  Randi Parks MD as PCP - General (Internal Medicine)  Tony Sanabria MD as Consulting Physician (Urology)  Juan Luis Young MD as Consulting Physician (Gastroenterology)    Chief complaint chest pain    Subjective .     History of present illness: Mr. Thompson is a 68-year-old male with salient medical history of coronary artery disease status post CABG x 3 vessels several years ago by Dr. Earle Rogers, hypertension, hyperlipidemia, atrial fibrillation, and sleep apnea.  He follows with Dr. Lorenzana of the cardiology services.  Due to increasing shortness of breath, he underwent additional workup which showed an abnormal stress test.  Cardiac catheterization on January 11 showed patent SVG to OM, patent LIMA to LAD, occluded SVG to RCA with  of the RCA, and preserved LV function.  He was recommended to undergo  procedure of right coronary and presented today for this procedure. Following, he was found to have a localized aortic dissection.  Dr. Lorenzana contacted Dr. Lepe for formal consultation of the aforementioned findings.  A CT angiogram of the chest has been ordered stat.    History  There are no questions and answers to display.     Past Medical History:   Diagnosis Date    Acid reflux     Arthritis     Benign prostatic hyperplasia with lower urinary tract symptoms 12/06/2017    Coronary artery disease     COVID-19     Elevated PSA 12/06/2016    Hyperlipidemia     Hypertension     New onset atrial fibrillation 08/31/2019    Sleep apnea     does not wear machine   ,   Past Surgical History:   Procedure Laterality Date    APPENDECTOMY      CARDIAC CATHETERIZATION      CARDIAC CATHETERIZATION N/A 1/11/2024    Procedure: Coronary angiography;  Surgeon: Tato Lorenzana MD;  Location: [facility] CATH INVASIVE LOCATION;  Service: Cardiology;  Laterality: N/A;    COLONOSCOPY  09/11/2012    Tics repeat exam in 10  years    COLONOSCOPY N/A 12/1/2022    Procedure: COLONOSCOPY WITH ANESTHESIA;  Surgeon: Juan Luis Young MD;  Location: [facility] ENDOSCOPY;  Service: Gastroenterology;  Laterality: N/A;  Pre: screen  Post: normal  Randi Parks MD    CORONARY ARTERY BYPASS GRAFT      3 bypasses    ENDOSCOPY N/A 12/1/2022    Procedure: ESOPHAGOGASTRODUODENOSCOPY WITH ANESTHESIA;  Surgeon: Juan Luis Young MD;  Location: [facility] ENDOSCOPY;  Service: Gastroenterology;  Laterality: N/A;  Pre: screen  Post: nml  Randi Parks MD    ENDOSCOPY N/A 6/27/2023    Procedure: ESOPHAGOGASTRODUODENOSCOPY WITH ANESTHESIA;  Surgeon: Juan Luis Young MD;  Location: [facility] ENDOSCOPY;  Service: Gastroenterology;  Laterality: N/A;  preop: Gastroesophageal reflux disease  post op: hiatal hernia  PCP:Randi Parks MD    TRIGGER FINGER RELEASE Left 3/7/2023    Procedure: LEFT MIDDLE TRIGGER FINGER RELEASE;  Surgeon: Warren Houser MD;  Location: [facility] OR;  Service: Orthopedics;  Laterality: Left;   ,   Family History   Problem Relation Age of Onset    Heart disease Mother     Stroke Mother     Prostate cancer Father     Heart disease Father     Heart attack Brother     Heart disease Brother     No Known Problems Brother     No Known Problems Maternal Grandmother     No Known Problems Maternal Grandfather     Heart disease Paternal Grandmother     No Known Problems Paternal Grandfather     Colon cancer Neg Hx     Colon polyps Neg Hx    ,   Social History     Tobacco Use    Smoking status: Never    Smokeless tobacco: Never   Vaping Use    Vaping Use: Never used   Substance Use Topics    Alcohol use: Yes     Alcohol/week: 1.0 standard drink of alcohol     Types: 1 Cans of beer per week     Comment: occasionally    Drug use: Never   ,   Medications Prior to Admission   Medication Sig Dispense Refill Last Dose    atenolol (TENORMIN) 25 MG tablet Take 1 tablet by mouth Every Night.   1/17/2024    finasteride (PROSCAR) 5 MG tablet Take 1 tablet by mouth  "Daily. 90 tablet 3 1/17/2024    losartan (COZAAR) 25 MG tablet Take 1 tablet by mouth Every Night.   1/17/2024    Omega-3 Fatty Acids (fish oil) 1000 MG capsule capsule Take 1 capsule by mouth Every Night.   1/17/2024    omeprazole (priLOSEC) 20 MG capsule TAKE (1) CAPSULE BY MOUTH TWICE DAILY BEFORE MEALS 60 capsule 11 1/17/2024    PATIENT SUPPLIED MEDICATION Every Night. 4 pills nightly   1/17/2024    simvastatin (ZOCOR) 40 MG tablet Take 1 tablet by mouth Every Night.   1/17/2024    tamsulosin (FLOMAX) 0.4 MG capsule 24 hr capsule Take 1 capsule by mouth Every Night. 90 capsule 3 1/17/2024    apixaban (Eliquis) 5 MG tablet tablet Take 1 tablet by mouth Every 12 (Twelve) Hours. 180 tablet 3 1/16/2024 at 0900   , Allergies: Ramipril    Review of Systems  Review of Systems   Constitutional:  Negative for diaphoresis and fever.   Respiratory:  Negative for shortness of breath and wheezing.    Cardiovascular:  Positive for chest pain and palpitations. Negative for leg swelling.   Genitourinary:         Blount catheter in place   Hematological:  Bruises/bleeds easily.        Objective     Vital Signs   Visit Vitals  /87   Pulse 76   Temp 96.8 °F (36 °C) (Temporal)   Resp 15   Ht 175.3 cm (69\")   Wt 96.6 kg (213 lb)   SpO2 100%   BMI 31.45 kg/m²       Physical Exam  Vitals reviewed.   Constitutional:       General: He is not in acute distress.     Appearance: He is well-developed. He is not diaphoretic.   HENT:      Head: Normocephalic and atraumatic.   Eyes:      Pupils: Pupils are equal, round, and reactive to light.   Cardiovascular:      Rate and Rhythm: Normal rate and regular rhythm. Rhythm irregular.      Heart sounds: Normal heart sounds. No murmur heard.     No friction rub.   Pulmonary:      Effort: Pulmonary effort is normal. No respiratory distress.      Breath sounds: Normal breath sounds. No wheezing or rales.   Abdominal:      General: There is no distension.      Palpations: Abdomen is soft.      " Tenderness: There is no abdominal tenderness. There is no guarding.   Musculoskeletal:      Cervical back: Normal range of motion and neck supple.      Right lower leg: No edema.      Left lower leg: No edema.   Skin:     General: Skin is warm and dry.      Coloration: Skin is not pale.      Findings: No rash.      Comments: Right femoral site post cath-clean and dry. No hematoma.    Neurological:      Mental Status: He is alert and oriented to person, place, and time.   Psychiatric:         Behavior: Behavior normal.           LAB:   CBC:  Results from last 7 days   Lab Units 01/18/24  1050   WBC 10*3/mm3 7.75   HEMATOCRIT % 43.7   PLATELETS 10*3/mm3 234          BMP:)  Results from last 7 days   Lab Units 01/18/24  1050   SODIUM mmol/L 140   POTASSIUM mmol/L 4.7   CHLORIDE mmol/L 105   CO2 mmol/L 27.0   GLUCOSE mg/dL 104*   BUN mg/dL 14   CREATININE mg/dL 0.91                  IMAGES:       Imaging Results (Last 24 Hours)       ** No results found for the last 24 hours. **                         Assessment & Plan        Localized aortic dissection following  procedure  CAD  History of sternotomy, CABG  Class I severe obesity  Paroxysmal atrial fibrillation  Acquired coagulation factor deficiency, eliquis on hold       Stat CTA of the chest obtained and reviewed in real time by Dr. Lepe and Dr. Lorenzana. There is a localized dissection of the aorta. This has been recommended to be managed conservatively. Will plan a repeat CTA of the chest in the morning.    Dr. Lepe updated patient's wife. He did discuss should this dissection worsen or progress, this would need to be fixed with redo sternotomy. He was educated to notify nursing staff of development of chest pain.   Nursing staff updated         Marnie Rodriguez, JOSETTE  01/18/24  16:15 CST

## 2024-01-18 NOTE — H&P
No chief complaint on file.      Subjective .     History of present illness:  Pawel Thompson is a 68 y.o. yo male with history of with CAD, s/p CABG who recently underwent cath due to AREVALO and an abnormal stress test which revealed a  of the RCA. He presents today for a  procedure on the RCA.    History  Past Medical History:   Diagnosis Date    Acid reflux     Arthritis     Benign prostatic hyperplasia with lower urinary tract symptoms 12/06/2017    Coronary artery disease     COVID-19     Elevated PSA 12/06/2016    Hyperlipidemia     Hypertension     New onset atrial fibrillation 08/31/2019    Sleep apnea     does not wear machine   ,   Past Surgical History:   Procedure Laterality Date    APPENDECTOMY      CARDIAC CATHETERIZATION      CARDIAC CATHETERIZATION N/A 1/11/2024    Procedure: Coronary angiography;  Surgeon: Tato Lorenzana MD;  Location: [facility] CATH INVASIVE LOCATION;  Service: Cardiology;  Laterality: N/A;    COLONOSCOPY  09/11/2012    Tics repeat exam in 10 years    COLONOSCOPY N/A 12/1/2022    Procedure: COLONOSCOPY WITH ANESTHESIA;  Surgeon: Juan Luis Young MD;  Location: [facility] ENDOSCOPY;  Service: Gastroenterology;  Laterality: N/A;  Pre: screen  Post: normal  Randi Parks MD    CORONARY ARTERY BYPASS GRAFT      3 bypasses    ENDOSCOPY N/A 12/1/2022    Procedure: ESOPHAGOGASTRODUODENOSCOPY WITH ANESTHESIA;  Surgeon: Juan Luis Young MD;  Location: [facility] ENDOSCOPY;  Service: Gastroenterology;  Laterality: N/A;  Pre: screen  Post: nml  Randi Parks MD    ENDOSCOPY N/A 6/27/2023    Procedure: ESOPHAGOGASTRODUODENOSCOPY WITH ANESTHESIA;  Surgeon: Juan Luis Young MD;  Location: [facility] ENDOSCOPY;  Service: Gastroenterology;  Laterality: N/A;  preop: Gastroesophageal reflux disease  post op: hiatal hernia  PCP:Randi Parks MD    TRIGGER FINGER RELEASE Left 3/7/2023    Procedure: LEFT MIDDLE TRIGGER FINGER RELEASE;  Surgeon: Warren Houser MD;  Location: [facility] OR;  Service:  Orthopedics;  Laterality: Left;   ,   Family History   Problem Relation Age of Onset    Heart disease Mother     Stroke Mother     Prostate cancer Father     Heart disease Father     Heart attack Brother     Heart disease Brother     No Known Problems Brother     No Known Problems Maternal Grandmother     No Known Problems Maternal Grandfather     Heart disease Paternal Grandmother     No Known Problems Paternal Grandfather     Colon cancer Neg Hx     Colon polyps Neg Hx    ,   Social History     Tobacco Use    Smoking status: Never    Smokeless tobacco: Never   Vaping Use    Vaping Use: Never used   Substance Use Topics    Alcohol use: Yes     Alcohol/week: 1.0 standard drink of alcohol     Types: 1 Cans of beer per week     Comment: occasionally    Drug use: Never   ,     Medications  Current Facility-Administered Medications   Medication Dose Route Frequency Provider Last Rate Last Admin    sodium chloride 0.9 % bolus 294 mL  3 mL/kg Intravenous Once Over 1 Hour Tato Lorenzana MD           Allergies:  Ramipril    Review of Systems  Review of Systems   HENT:  Negative for nosebleeds.    Cardiovascular:  Positive for dyspnea on exertion. Negative for chest pain, claudication, leg swelling, near-syncope, orthopnea, palpitations, paroxysmal nocturnal dyspnea and syncope.   Respiratory:  Negative for hemoptysis and shortness of breath.    Gastrointestinal:  Negative for melena.   Genitourinary:  Negative for hematuria.       Objective     Physical Exam:  No data found.  Pulmonary:      Effort: Pulmonary effort is normal.      Breath sounds: Normal breath sounds.   Cardiovascular:      Normal rate. Regular rhythm.      Murmurs: There is no murmur.   Edema:     Peripheral edema absent.         Results Review:   I reviewed the patient's new clinical results.  Lab Results (last 24 hours)       ** No results found for the last 24 hours. **          Imaging Results (Last 24 Hours)       ** No results found for the last 24  "hours. **          No results found for: \"ECHOEFEST\"      Assessment & Plan     Coronary artery disease involving coronary bypass graft of native heart with unstable angina pectoris    Will proceed with the  procedure as planned. The risk and potential complications as well as anticipated benefits were discussed with the patient and he wishes to proceed with the planned procedure    MDM High Complexity                "

## 2024-01-19 ENCOUNTER — APPOINTMENT (OUTPATIENT)
Dept: CT IMAGING | Facility: HOSPITAL | Age: 69
DRG: 321 | End: 2024-01-19
Payer: MEDICARE

## 2024-01-19 LAB
ANION GAP SERPL CALCULATED.3IONS-SCNC: 13 MMOL/L (ref 5–15)
BUN SERPL-MCNC: 13 MG/DL (ref 8–23)
BUN/CREAT SERPL: 17.3 (ref 7–25)
CALCIUM SPEC-SCNC: 9.3 MG/DL (ref 8.6–10.5)
CHLORIDE SERPL-SCNC: 105 MMOL/L (ref 98–107)
CO2 SERPL-SCNC: 20 MMOL/L (ref 22–29)
CREAT SERPL-MCNC: 0.75 MG/DL (ref 0.76–1.27)
DEPRECATED RDW RBC AUTO: 40.7 FL (ref 37–54)
EGFRCR SERPLBLD CKD-EPI 2021: 97.1 ML/MIN/1.73
ERYTHROCYTE [DISTWIDTH] IN BLOOD BY AUTOMATED COUNT: 13.2 % (ref 12.3–15.4)
GLUCOSE SERPL-MCNC: 113 MG/DL (ref 65–99)
HCT VFR BLD AUTO: 42.7 % (ref 37.5–51)
HGB BLD-MCNC: 14.4 G/DL (ref 13–17.7)
MCH RBC QN AUTO: 28.9 PG (ref 26.6–33)
MCHC RBC AUTO-ENTMCNC: 33.7 G/DL (ref 31.5–35.7)
MCV RBC AUTO: 85.6 FL (ref 79–97)
PLATELET # BLD AUTO: 229 10*3/MM3 (ref 140–450)
PMV BLD AUTO: 10.5 FL (ref 6–12)
POTASSIUM SERPL-SCNC: 4.1 MMOL/L (ref 3.5–5.2)
RBC # BLD AUTO: 4.99 10*6/MM3 (ref 4.14–5.8)
SODIUM SERPL-SCNC: 138 MMOL/L (ref 136–145)
WBC NRBC COR # BLD AUTO: 13.2 10*3/MM3 (ref 3.4–10.8)

## 2024-01-19 PROCEDURE — 93005 ELECTROCARDIOGRAM TRACING: CPT | Performed by: INTERNAL MEDICINE

## 2024-01-19 PROCEDURE — 99233 SBSQ HOSP IP/OBS HIGH 50: CPT | Performed by: INTERNAL MEDICINE

## 2024-01-19 PROCEDURE — 25510000001 IOPAMIDOL PER 1 ML: Performed by: INTERNAL MEDICINE

## 2024-01-19 PROCEDURE — 97161 PT EVAL LOW COMPLEX 20 MIN: CPT

## 2024-01-19 PROCEDURE — 25010000002 HYALURONIDASE (HUMAN) 150 UNIT/ML SOLUTION 1 ML VIAL: Performed by: INTERNAL MEDICINE

## 2024-01-19 PROCEDURE — 99232 SBSQ HOSP IP/OBS MODERATE 35: CPT | Performed by: NURSE PRACTITIONER

## 2024-01-19 PROCEDURE — 71275 CT ANGIOGRAPHY CHEST: CPT

## 2024-01-19 PROCEDURE — 25810000003 SODIUM CHLORIDE 0.9 % SOLUTION: Performed by: INTERNAL MEDICINE

## 2024-01-19 PROCEDURE — 93010 ELECTROCARDIOGRAM REPORT: CPT | Performed by: INTERNAL MEDICINE

## 2024-01-19 PROCEDURE — 85027 COMPLETE CBC AUTOMATED: CPT | Performed by: INTERNAL MEDICINE

## 2024-01-19 PROCEDURE — 80048 BASIC METABOLIC PNL TOTAL CA: CPT | Performed by: INTERNAL MEDICINE

## 2024-01-19 RX ORDER — ASPIRIN 81 MG/1
81 TABLET, CHEWABLE ORAL DAILY
Status: DISCONTINUED | OUTPATIENT
Start: 2024-01-19 | End: 2024-01-20 | Stop reason: HOSPADM

## 2024-01-19 RX ADMIN — NITROGLYCERIN 0.4 MG: 0.4 TABLET SUBLINGUAL at 06:54

## 2024-01-19 RX ADMIN — PANTOPRAZOLE SODIUM 40 MG: 40 TABLET, DELAYED RELEASE ORAL at 06:54

## 2024-01-19 RX ADMIN — METOPROLOL TARTRATE 5 MG: 1 INJECTION, SOLUTION INTRAVENOUS at 00:21

## 2024-01-19 RX ADMIN — LOSARTAN POTASSIUM 25 MG: 50 TABLET, FILM COATED ORAL at 20:10

## 2024-01-19 RX ADMIN — SODIUM CHLORIDE 100 ML/HR: 9 INJECTION, SOLUTION INTRAVENOUS at 03:46

## 2024-01-19 RX ADMIN — FINASTERIDE 5 MG: 5 TABLET, FILM COATED ORAL at 08:16

## 2024-01-19 RX ADMIN — HYALURONIDASE (HUMAN RECOMBINANT) 150 UNITS: 150 INJECTION, SOLUTION SUBCUTANEOUS at 07:24

## 2024-01-19 RX ADMIN — ATORVASTATIN CALCIUM 40 MG: 40 TABLET, FILM COATED ORAL at 20:10

## 2024-01-19 RX ADMIN — TICAGRELOR 60 MG: 60 TABLET ORAL at 17:23

## 2024-01-19 RX ADMIN — IOPAMIDOL 100 ML: 755 INJECTION, SOLUTION INTRAVENOUS at 06:24

## 2024-01-19 RX ADMIN — ASPIRIN 81 MG: 81 TABLET, CHEWABLE ORAL at 20:10

## 2024-01-19 RX ADMIN — SODIUM CHLORIDE 100 ML/HR: 9 INJECTION, SOLUTION INTRAVENOUS at 16:02

## 2024-01-19 RX ADMIN — TAMSULOSIN HYDROCHLORIDE 0.4 MG: 0.4 CAPSULE ORAL at 20:10

## 2024-01-19 RX ADMIN — ATENOLOL 25 MG: 25 TABLET ORAL at 20:10

## 2024-01-19 RX ADMIN — TICAGRELOR 90 MG: 90 TABLET ORAL at 06:54

## 2024-01-19 NOTE — PROGRESS NOTES
"Patient name: Pawel Thompson  Patient : 1955  VISIT # [id number]  MR #[id number]    Procedure:Procedure(s):  Chronic Total Occlussion  Procedure Date:2024  POD:1 Day Post-Op    Subjective   CC: shortness of breath    Sitting up in bed eating a breakfast muffin from [employer].  No chest pain.  She has a rash family at bedside.  CTA of the chest reviewed and detailed below.      ROS: no fevers, sweats, chills. No chest pain. Hemodynamically stable.        Objective     Visit Vitals  /58   Pulse 62   Temp 98.7 °F (37.1 °C) (Oral)   Resp 20   Ht 175.3 cm (69\")   Wt 96.6 kg (213 lb)   SpO2 98%   BMI 31.45 kg/m²       Intake/Output Summary (Last 24 hours) at 2024 0933  Last data filed at 2024 0845  Gross per 24 hour   Intake 1448.2 ml   Output 1950 ml   Net -501.8 ml       Lab:     CBC:  Results from last 7 days   Lab Units 24  0314 24  1050   WBC 10*3/mm3 13.20* 7.75   HEMATOCRIT % 42.7 43.7   PLATELETS 10*3/mm3 229 234          BMP:  Results from last 7 days   Lab Units 24  0314 24  1050   SODIUM mmol/L 138 140   POTASSIUM mmol/L 4.1 4.7   CHLORIDE mmol/L 105 105   CO2 mmol/L 20.0* 27.0   GLUCOSE mg/dL 113* 104*   BUN mg/dL 13 14   CREATININE mg/dL 0.75* 0.91       IMAGES:       Imaging Results (Last 24 Hours)       Procedure Component Value Units Date/Time    CT Angiogram Chest [[id number]] Collected: 24     Updated: 24    Narrative:      Exam: CT angiogram of the of the chest with intravenous contrast.  2024     CLINICAL HISTORY: Follow-up aortic dissection.     DOSE:  286.35 mGy.cm . All CT scans are performed using dose  optimization techniques as appropriate to the performed exam and  including at least one of the following: Automated exposure control,  adjustment of the mA and/or kV according to size, and the use of the  iterative reconstruction technique.     TECHNIQUE: Contiguous axial images were obtained through the " thorax  following intravenous contrast administration with reformatted images  obtained in the sagittal and coronal projections from the original data  set. Three-dimensional reconstructions are also obtained.     COMPARISON: 1/18/2024     FINDINGS:     Pulmonary arteries:  There is normal enhancement of the pulmonary  arteries with no evidence of pulmonary thromboembolic disease.     Aorta: Again demonstrated is crescentic heterogeneous density along the  anterior and lateral margin of the ascending thoracic aorta extending  from the aortic root to the mid ascending thoracic aorta. The length and  size of the dissection are stable to slightly diminished from  yesterday's exam with no evidence of further propagation. Previously the  false lumen measures approximately 5.3 cm in long axis measuring  approximately 4.8 cm on the current exam. The thickness of the false  lumen previously measured 10 mm now measuring 7 mm. There is mild  dilatation of the mid and distal ascending thoracic aorta stable from  the previous exam. There is atheromatous calcification of the aortic  arch and descending thoracic aorta with ectasia. No additional sites of  intimal flap or dissection are demonstrated. The proximal great vessels  are tortuous.     LUNGS: There is evidence of remote granulomatous disease. There is  bibasilar atelectasis. No acute consolidative pneumonia or effusion.     Pleural spaces: Unremarkable. No evidence of pleural effusion or  pneumothorax.     HEART: The heart is mildly enlarged. Severe coronary artery  calcifications are present. No evidence of right ventricular dysfunction  or pericardial effusion.     Bones: No acute osseous abnormalities are demonstrated.     CHEST WALL: No chest wall abnormalities are demonstrated.     Lymph nodes: No enlarged mediastinal or axillary lymphadenopathy is  present.     Upper abdomen: Limited images of the upper abdomen are unremarkable.       Impression:      1. Tucker  type A aortic dissection. This is noted from the level of  the aortic root into the mid ascending thoracic aorta. The false lumen  is slightly diminished in length in transverse dimension when compared  to the previous exam. No propagation into the more distal ascending  thoracic aorta or aortic arch. No additional intimal flap is present.  Stable dilatation of the ascending thoracic aorta. There is ectasia of  the proximal great vessels.  2. Bibasilar atelectasis. Lungs are otherwise clear except for remote  granulomatous disease. No consolidative pneumonia or effusion.  3. Mild cardiac enlargement with moderate to severe coronary artery  calcifications. No pericardial effusion.     This report was signed and finalized on 1/19/2024 7:14 AM by Dr. Carlos Palomares MD.       CT Angiogram Chest [[id number]] Collected: 01/18/24 1643     Updated: 01/18/24 1656    Narrative:      EXAMINATION: CT ANGIOGRAM CHEST-      1/18/2024 3:26 PM     HISTORY: aortic dissection; I25.700-Atherosclerosis of coronary artery  bypass graft(s), unspecified, with unstable angina pectoris     In order to have a CT radiation dose as low as reasonably achievable  Automated Exposure Control was utilized for adjustment of the mA and/or  KV according to patient size.     CT Dose DLP = 484.75 mGy.cm.  (If there are multiple studies performed at the same time this  represents the total dose).     Chest CT angiography without and with IV contrast.  CT angiography protocol.  CT imaging with bolus IV contrast injection.  Under concurrent supervision axial, sagittal, coronal,  three-dimensional, and MIP data sets were constructed on an independent  work station.     Heart size is within normal limits.  CABG changes.  Negative coronary artery calcification.  Curvilinear hyperdensity at the root of the thoracic aorta and extending  into the ascending aorta over a length of 53 mm.  This crescentic hyperdense collection measures 35 mm AP and up to 10  mm  in thickness.  Given the history of aortic dissection this apparently represents  extravasated intramural contrast within the proximal thoracic aorta.     The ascending aorta diameter at the level of the RIGHT pulmonary artery  is 41 x 42 mm on axial image 66.  The root of the aorta at the level of the valve measures 38 mm on  coronal image 60.     There is no dissection flap within the aortic arch or descending  thoracic aorta.  The great vessel origins are normally patent.     Chronic lung changes with mild dependent atelectasis.  No pneumonia, pneumothorax, or heart failure.       Impression:      1. Crescentic hyperdense collection within the proximal thoracic aorta  compatible with focal dissection and extravasated intramural contrast.     This report was signed and finalized on 1/18/2024 4:53 PM by Dr. Chadd Monzon MD.             Reviewed CTA chest with Dr. Lepe and compared the last 2 scans. There is improvement in localized ascending aortic dissection on most recent imaging.     Physical Exam:  General: Alert, oriented. No apparent distress.   Cardiovascular: Regular rate and rhythm without murmur, rubs, or gallops.  Right femoral cath site clean and dry. No hematoma.   Pulmonary: Clear to auscultation bilaterally without wheezing, rubs, or rales.  Abdomen: Soft, non distended, and non tender.  Extremities: Warm, moves all extremities. No edema.   Neurologic:  Grossly intact with no focal deficits.            Impression:  Localized aortic dissection following  procedure  CAD  History of sternotomy, CABG  Class I severe obesity  Paroxysmal atrial fibrillation, in sinus rhythm  Acquired coagulation factor deficiency, eliquis on hold       Plan:  Discussed findings of CTA chest. Okay for transfer to  from CT surgery standpoint. Repeat CTA chest in the morning and if stable, anticipate DC home.   Dr. Lepe discussed with Dr. Lorenzana. Will continue to hold anticoagulation.   Discussed with patient and  family      Marnie Rodriguez, APRN  01/19/24  09:33 CST

## 2024-01-19 NOTE — PLAN OF CARE
Goal Outcome Evaluation:  Plan of Care Reviewed With: patient, spouse           Outcome Evaluation: PT evaluated. Pt in fowlers position with spouse in room; A&Ox4. PLOF ind with ADLs, ambulation, step climbing, and community activities. Pt reports living in multilevel home with very infrequent use of stairs and reports walking 2miles 5 days/week. Pt highly motivated to ambulate during session. Pt performed bed mobility ind and able to sit EOB ind/supervision. sit>stand transfer performed supervision. Pt ambulated up and down hallway a total of 470ft with L and R turns CGA/SBA and no LOB. Pt with no gait deviations noted. Pt returned to fowlers position independently. Pt with no reports of pain nor SOA. Skilled therapy not necessary at this time. Recommended D/C home with spouse. PT signing off.      Anticipated Discharge Disposition (PT): home

## 2024-01-19 NOTE — PLAN OF CARE
Goal Outcome Evaluation:  Plan of Care Reviewed With: patient, spouse, daughter        Progress: improving  Outcome Evaluation: Patient transferred out from unit today. No c/o pain, vitals WNL. Blount removed today, urine output has been low since then but is picking up, this will need to be watched as he reported some blood in his urine earlier today and I have seen one clot. Probably discharge tomorrow.

## 2024-01-19 NOTE — PROGRESS NOTES
Referring Provider: Tato Lorenzana MD    Length of Stay: 1    Chief Complaint: No chief complaint on file.      Subjective: I want to eat    Medications  Current Facility-Administered Medications   Medication Dose Route Frequency Provider Last Rate Last Admin    acetaminophen (TYLENOL) tablet 650 mg  650 mg Oral Q4H PRN Tato Lorenzana MD        atenolol (TENORMIN) tablet 25 mg  25 mg Oral Nightly Tato Lorenzana MD   25 mg at 01/18/24 2039    atorvastatin (LIPITOR) tablet 40 mg  40 mg Oral Nightly Tato Lorenzana MD   40 mg at 01/18/24 2039    finasteride (PROSCAR) tablet 5 mg  5 mg Oral Daily Tato Lorenzana MD   5 mg at 01/18/24 1753    losartan (COZAAR) tablet 25 mg  25 mg Oral Nightly Tato Lorenzana MD   25 mg at 01/18/24 2039    metoprolol tartrate (LOPRESSOR) injection 5 mg  5 mg Intravenous Q6H PRN Indra Lepe MD   5 mg at 01/19/24 0021    nitroglycerin (NITROSTAT) SL tablet 0.4 mg  0.4 mg Sublingual Q5 Min PRN Tato Lorenzana MD   0.4 mg at 01/19/24 0654    nitroglycerin (TRIDIL) 200 mcg/ml infusion  5-200 mcg/min Intravenous Titrated Tato Lorenzana MD   Held at 01/18/24 1702    pantoprazole (PROTONIX) EC tablet 40 mg  40 mg Oral Q AM Tato Lorenzana MD   40 mg at 01/19/24 0654    sodium chloride 0.9 % infusion  100 mL/hr Intravenous Continuous LorenzanaTato almeida  mL/hr at 01/19/24 0346 100 mL/hr at 01/19/24 0346    tamsulosin (FLOMAX) 24 hr capsule 0.4 mg  0.4 mg Oral Nightly Tato Lorenzana MD   0.4 mg at 01/18/24 2039    ticagrelor (BRILINTA) tablet 90 mg  90 mg Oral BID Tato Lorenzana MD   90 mg at 01/19/24 0654       Past Medical History:   Diagnosis Date    Acid reflux     Arthritis     Benign prostatic hyperplasia with lower urinary tract symptoms 12/06/2017    Coronary artery disease     COVID-19     Elevated PSA 12/06/2016    Hyperlipidemia     Hypertension     New onset atrial fibrillation 08/31/2019    Sleep apnea     does not wear machine   ,   Past Surgical History:   Procedure Laterality  Date    APPENDECTOMY      CARDIAC CATHETERIZATION      CARDIAC CATHETERIZATION N/A 1/11/2024    Procedure: Coronary angiography;  Surgeon: Tato Lorenzana MD;  Location: [facility] CATH INVASIVE LOCATION;  Service: Cardiology;  Laterality: N/A;    COLONOSCOPY  09/11/2012    Tics repeat exam in 10 years    COLONOSCOPY N/A 12/1/2022    Procedure: COLONOSCOPY WITH ANESTHESIA;  Surgeon: Juan Luis Young MD;  Location: [facility] ENDOSCOPY;  Service: Gastroenterology;  Laterality: N/A;  Pre: screen  Post: normal  Randi Parks MD    CORONARY ARTERY BYPASS GRAFT      3 bypasses    ENDOSCOPY N/A 12/1/2022    Procedure: ESOPHAGOGASTRODUODENOSCOPY WITH ANESTHESIA;  Surgeon: Juan Luis Young MD;  Location: [facility] ENDOSCOPY;  Service: Gastroenterology;  Laterality: N/A;  Pre: screen  Post: nml  Randi Parks MD    ENDOSCOPY N/A 6/27/2023    Procedure: ESOPHAGOGASTRODUODENOSCOPY WITH ANESTHESIA;  Surgeon: Juan Luis Young MD;  Location: [facility] ENDOSCOPY;  Service: Gastroenterology;  Laterality: N/A;  preop: Gastroesophageal reflux disease  post op: hiatal hernia  PCP:Randi Parks MD    TRIGGER FINGER RELEASE Left 3/7/2023    Procedure: LEFT MIDDLE TRIGGER FINGER RELEASE;  Surgeon: Warren Houser MD;  Location: [facility] OR;  Service: Orthopedics;  Laterality: Left;   ,   Family History   Problem Relation Age of Onset    Heart disease Mother     Stroke Mother     Prostate cancer Father     Heart disease Father     Heart attack Brother     Heart disease Brother     No Known Problems Brother     No Known Problems Maternal Grandmother     No Known Problems Maternal Grandfather     Heart disease Paternal Grandmother     No Known Problems Paternal Grandfather     Colon cancer Neg Hx     Colon polyps Neg Hx    ,   Social History     Tobacco Use    Smoking status: Never    Smokeless tobacco: Never   Vaping Use    Vaping Use: Never used   Substance Use Topics    Alcohol use: Yes     Alcohol/week: 1.0 standard drink of alcohol     Types:  1 Cans of beer per week     Comment: occasionally    Drug use: Never   ,    Review of Systems  Review of Systems   HENT:  Negative for nosebleeds.    Cardiovascular:  Negative for chest pain, claudication, dyspnea on exertion, leg swelling, near-syncope, orthopnea, palpitations, paroxysmal nocturnal dyspnea and syncope.   Respiratory:  Negative for hemoptysis and shortness of breath.    Gastrointestinal:  Negative for melena.   Genitourinary:  Negative for hematuria.       Objective     Physical Exam:  Pulmonary:      Effort: Pulmonary effort is normal.      Breath sounds: Normal breath sounds.   Cardiovascular:      Normal rate. Regular rhythm.      Murmurs: There is no murmur.   Edema:     Peripheral edema absent.       Patient Vitals for the past 24 hrs:   BP Temp Temp src Pulse Resp SpO2 Height Weight   01/19/24 0804 -- -- -- 66 -- 99 % -- --   01/19/24 0800 116/56 -- -- 76 -- 99 % -- --   01/19/24 0750 -- -- -- 69 -- 98 % -- --   01/19/24 0730 120/59 -- -- 70 -- 98 % -- --   01/19/24 0720 -- -- -- 65 -- 99 % -- --   01/19/24 0715 128/60 -- -- 76 -- 100 % -- --   01/19/24 0700 127/71 -- -- 75 -- 98 % -- --   01/19/24 0600 124/67 -- -- 72 20 97 % -- --   01/19/24 0500 127/69 -- -- 72 18 98 % -- --   01/19/24 0400 117/70 98.7 °F (37.1 °C) Oral 66 17 98 % -- --   01/19/24 0300 118/66 -- -- 74 15 97 % -- --   01/19/24 0200 115/62 -- -- 74 19 96 % -- --   01/19/24 0100 120/62 -- -- 80 17 94 % -- --   01/19/24 0000 118/60 98.7 °F (37.1 °C) Oral 79 20 93 % -- --   01/18/24 2300 124/76 -- -- 83 18 93 % -- --   01/18/24 2200 114/62 -- -- 89 20 92 % -- --   01/18/24 2100 117/68 -- -- 76 19 96 % -- --   01/18/24 2015 122/66 -- -- 86 17 99 % -- --   01/18/24 2000 -- 98.6 °F (37 °C) Axillary 82 -- 97 % -- --   01/18/24 1900 125/66 -- -- 76 -- 94 % -- --   01/18/24 1845 124/71 -- -- 79 -- 97 % -- --   01/18/24 1830 117/71 -- -- 91 -- 94 % -- --   01/18/24 1815 133/74 -- -- 86 -- 94 % -- --   01/18/24 1800 134/78 -- -- 79 12  "96 % -- --   01/18/24 1745 138/70 -- -- 76 -- 98 % -- --   01/18/24 1730 128/74 -- -- 78 -- 95 % -- --   01/18/24 1715 131/72 -- -- 89 -- 93 % -- --   01/18/24 1700 138/78 -- -- 74 16 96 % -- --   01/18/24 1645 -- 97.7 °F (36.5 °C) Oral -- -- -- -- --   01/18/24 1620 145/78 -- -- 81 16 100 % -- --   01/18/24 1614 148/87 -- -- 76 15 100 % -- --   01/18/24 1609 140/87 -- -- 65 14 100 % -- --   01/18/24 1604 142/76 -- -- 74 16 100 % -- --   01/18/24 1330 141/72 -- -- 59 16 100 % -- --   01/18/24 1329 -- -- -- -- -- 100 % -- --   01/18/24 1104 126/72 96.8 °F (36 °C) Temporal 53 11 98 % 175.3 cm (69\") 96.6 kg (213 lb)     Physical Exam  Cardiovascular:      Rate and Rhythm: Normal rate and regular rhythm.   Pulmonary:      Effort: Pulmonary effort is normal.      Breath sounds: Normal breath sounds.   Musculoskeletal:         General: No edema.          Results Review:   I reviewed the patient's new clinical results.  Lab Results (last 24 hours)       Procedure Component Value Units Date/Time    Basic Metabolic Panel [[id number]]  (Abnormal) Collected: 01/19/24 0314    Specimen: Blood Updated: 01/19/24 0409     Glucose 113 mg/dL      BUN 13 mg/dL      Creatinine 0.75 mg/dL      Sodium 138 mmol/L      Potassium 4.1 mmol/L      Chloride 105 mmol/L      CO2 20.0 mmol/L      Calcium 9.3 mg/dL      BUN/Creatinine Ratio 17.3     Anion Gap 13.0 mmol/L      eGFR 97.1 mL/min/1.73     Narrative:      GFR Normal >60  Chronic Kidney Disease <60  Kidney Failure <15      CBC (No Diff) [[id number]]  (Abnormal) Collected: 01/19/24 0314    Specimen: Blood Updated: 01/19/24 0403     WBC 13.20 10*3/mm3      RBC 4.99 10*6/mm3      Hemoglobin 14.4 g/dL      Hematocrit 42.7 %      MCV 85.6 fL      MCH 28.9 pg      MCHC 33.7 g/dL      RDW 13.2 %      RDW-SD 40.7 fl      MPV 10.5 fL      Platelets 229 10*3/mm3     Comprehensive Metabolic Panel [[id number]]  (Abnormal) Collected: 01/18/24 1050    Specimen: Blood Updated: 01/18/24 1127     Glucose " "104 mg/dL      BUN 14 mg/dL      Creatinine 0.91 mg/dL      Sodium 140 mmol/L      Potassium 4.7 mmol/L      Chloride 105 mmol/L      CO2 27.0 mmol/L      Calcium 9.8 mg/dL      Total Protein 7.3 g/dL      Albumin 4.7 g/dL      ALT (SGPT) 22 U/L      AST (SGOT) 18 U/L      Alkaline Phosphatase 45 U/L      Total Bilirubin 0.4 mg/dL      Globulin 2.6 gm/dL      A/G Ratio 1.8 g/dL      BUN/Creatinine Ratio 15.4     Anion Gap 8.0 mmol/L      eGFR 90.7 mL/min/1.73     Narrative:      GFR Normal >60  Chronic Kidney Disease <60  Kidney Failure <15      CBC & Differential [[id number]]  (Normal) Collected: 01/18/24 1050    Specimen: Blood Updated: 01/18/24 1105    Narrative:      The following orders were created for panel order CBC & Differential.  Procedure                               Abnormality         Status                     ---------                               -----------         ------                     CBC Auto Differential[[id number]]        Normal              Final result                 Please view results for these tests on the individual orders.    CBC Auto Differential [[id number]]  (Normal) Collected: 01/18/24 1050    Specimen: Blood Updated: 01/18/24 1105     WBC 7.75 10*3/mm3      RBC 5.01 10*6/mm3      Hemoglobin 14.6 g/dL      Hematocrit 43.7 %      MCV 87.2 fL      MCH 29.1 pg      MCHC 33.4 g/dL      RDW 13.3 %      RDW-SD 42.1 fl      MPV 10.4 fL      Platelets 234 10*3/mm3      Neutrophil % 62.4 %      Lymphocyte % 25.7 %      Monocyte % 8.9 %      Eosinophil % 2.1 %      Basophil % 0.5 %      Immature Grans % 0.4 %      Neutrophils, Absolute 4.84 10*3/mm3      Lymphocytes, Absolute 1.99 10*3/mm3      Monocytes, Absolute 0.69 10*3/mm3      Eosinophils, Absolute 0.16 10*3/mm3      Basophils, Absolute 0.04 10*3/mm3      Immature Grans, Absolute 0.03 10*3/mm3      nRBC 0.0 /100 WBC           No results found for: \"ECHOEFEST\"  Imaging Results (Last 24 Hours)       Procedure Component Value Units " Date/Time    CT Angiogram Chest [[id number]] Collected: 01/19/24 0658     Updated: 01/19/24 0717    Narrative:      Exam: CT angiogram of the of the chest with intravenous contrast.  1/19/2024     CLINICAL HISTORY: Follow-up aortic dissection.     DOSE:  286.35 mGy.cm . All CT scans are performed using dose  optimization techniques as appropriate to the performed exam and  including at least one of the following: Automated exposure control,  adjustment of the mA and/or kV according to size, and the use of the  iterative reconstruction technique.     TECHNIQUE: Contiguous axial images were obtained through the thorax  following intravenous contrast administration with reformatted images  obtained in the sagittal and coronal projections from the original data  set. Three-dimensional reconstructions are also obtained.     COMPARISON: 1/18/2024     FINDINGS:     Pulmonary arteries:  There is normal enhancement of the pulmonary  arteries with no evidence of pulmonary thromboembolic disease.     Aorta: Again demonstrated is crescentic heterogeneous density along the  anterior and lateral margin of the ascending thoracic aorta extending  from the aortic root to the mid ascending thoracic aorta. The length and  size of the dissection are stable to slightly diminished from  yesterday's exam with no evidence of further propagation. Previously the  false lumen measures approximately 5.3 cm in long axis measuring  approximately 4.8 cm on the current exam. The thickness of the false  lumen previously measured 10 mm now measuring 7 mm. There is mild  dilatation of the mid and distal ascending thoracic aorta stable from  the previous exam. There is atheromatous calcification of the aortic  arch and descending thoracic aorta with ectasia. No additional sites of  intimal flap or dissection are demonstrated. The proximal great vessels  are tortuous.     LUNGS: There is evidence of remote granulomatous disease. There is  bibasilar  atelectasis. No acute consolidative pneumonia or effusion.     Pleural spaces: Unremarkable. No evidence of pleural effusion or  pneumothorax.     HEART: The heart is mildly enlarged. Severe coronary artery  calcifications are present. No evidence of right ventricular dysfunction  or pericardial effusion.     Bones: No acute osseous abnormalities are demonstrated.     CHEST WALL: No chest wall abnormalities are demonstrated.     Lymph nodes: No enlarged mediastinal or axillary lymphadenopathy is  present.     Upper abdomen: Limited images of the upper abdomen are unremarkable.       Impression:      1. Johnny type A aortic dissection. This is noted from the level of  the aortic root into the mid ascending thoracic aorta. The false lumen  is slightly diminished in length in transverse dimension when compared  to the previous exam. No propagation into the more distal ascending  thoracic aorta or aortic arch. No additional intimal flap is present.  Stable dilatation of the ascending thoracic aorta. There is ectasia of  the proximal great vessels.  2. Bibasilar atelectasis. Lungs are otherwise clear except for remote  granulomatous disease. No consolidative pneumonia or effusion.  3. Mild cardiac enlargement with moderate to severe coronary artery  calcifications. No pericardial effusion.     This report was signed and finalized on 1/19/2024 7:14 AM by Dr. Carlos Palomares MD.       CT Angiogram Chest [[id number]] Collected: 01/18/24 1643     Updated: 01/18/24 1656    Narrative:      EXAMINATION: CT ANGIOGRAM CHEST-      1/18/2024 3:26 PM     HISTORY: aortic dissection; I25.700-Atherosclerosis of coronary artery  bypass graft(s), unspecified, with unstable angina pectoris     In order to have a CT radiation dose as low as reasonably achievable  Automated Exposure Control was utilized for adjustment of the mA and/or  KV according to patient size.     CT Dose DLP = 484.75 mGy.cm.  (If there are multiple studies  performed at the same time this  represents the total dose).     Chest CT angiography without and with IV contrast.  CT angiography protocol.  CT imaging with bolus IV contrast injection.  Under concurrent supervision axial, sagittal, coronal,  three-dimensional, and MIP data sets were constructed on an independent  work station.     Heart size is within normal limits.  CABG changes.  Negative coronary artery calcification.  Curvilinear hyperdensity at the root of the thoracic aorta and extending  into the ascending aorta over a length of 53 mm.  This crescentic hyperdense collection measures 35 mm AP and up to 10 mm  in thickness.  Given the history of aortic dissection this apparently represents  extravasated intramural contrast within the proximal thoracic aorta.     The ascending aorta diameter at the level of the RIGHT pulmonary artery  is 41 x 42 mm on axial image 66.  The root of the aorta at the level of the valve measures 38 mm on  coronal image 60.     There is no dissection flap within the aortic arch or descending  thoracic aorta.  The great vessel origins are normally patent.     Chronic lung changes with mild dependent atelectasis.  No pneumonia, pneumothorax, or heart failure.       Impression:      1. Crescentic hyperdense collection within the proximal thoracic aorta  compatible with focal dissection and extravasated intramural contrast.     This report was signed and finalized on 1/18/2024 4:53 PM by Dr. Chadd Monzon MD.               I have reviewed the most recent ECG and telemetry which reveals SR with PAC's. Inferior Q waves    Assessment & Plan     Coronary artery disease involving coronary bypass graft of native heart with unstable angina pectoris    PAF (paroxysmal atrial fibrillation)    Primary hypertension    Mixed hyperlipidemia    Unstable angina      New Problems that need treatment:  S/P successful  of the RCA  Limited aortic root dissection, repeat CTA looks much better  PAF,  now in SR  Will transfer to telemetry and consider D/C in am. Resume eliquis tonight  Old problems that are stable:   HLD  HTN    Medical Decision Making: High Complexity

## 2024-01-19 NOTE — PLAN OF CARE
Goal Outcome Evaluation:          Pt A&OX4. Follows commands. A fib - rate controlled. Systolic BP maintained <140. Lopressor 5mg IV given for HR >70. Room air, sat currently 98%. NPO. NS currently infusing @ 100. R groin site clean, dry, intact and soft - no hematoma noted. Palpable pulses 2+. Phenergan ordered for nausea. Pt complained of chest pain and SOB this morning. Nitro given x1 and symptoms were relieved. CTA ordered for 0800 but went ahead and obtained scan due to pt having chest pain. IV infiltrated with contrast during CTA, contrast was given again through another IV to obtain scan and was successful. IV extravasation protocol ordered and implemented.

## 2024-01-19 NOTE — THERAPY DISCHARGE NOTE
Patient Name: Pawel Thompson  : 1955    MRN: [id number]                              Today's Date: 2024       Admit Date: 2024    Visit Dx:     ICD-10-CM ICD-9-CM   1. Coronary artery disease involving coronary bypass graft of native heart with unstable angina pectoris  I25.700 414.05     411.1     Patient Active Problem List   Diagnosis    PAF (paroxysmal atrial fibrillation)    Coronary artery disease involving coronary bypass graft of native heart with unstable angina pectoris    Primary hypertension    Mixed hyperlipidemia    S/P CABG x 3    Current use of long term anticoagulation    Class 1 obesity due to excess calories with serious comorbidity and body mass index (BMI) of 33.0 to 33.9 in adult    Encounter for screening for malignant neoplasm of colon    Trigger finger, left middle finger    Gastroesophageal reflux disease    Unstable angina     Past Medical History:   Diagnosis Date    Acid reflux     Arthritis     Benign prostatic hyperplasia with lower urinary tract symptoms 2017    Coronary artery disease     COVID-19     Elevated PSA 2016    Hyperlipidemia     Hypertension     New onset atrial fibrillation 2019    Sleep apnea     does not wear machine     Past Surgical History:   Procedure Laterality Date    APPENDECTOMY      CARDIAC CATHETERIZATION      CARDIAC CATHETERIZATION N/A 2024    Procedure: Coronary angiography;  Surgeon: Tato Lorenzana MD;  Location: [facility] CATH INVASIVE LOCATION;  Service: Cardiology;  Laterality: N/A;    CARDIAC CATHETERIZATION N/A 2024    Procedure: Chronic Total Occlussion;  Surgeon: Tato Lorenzana MD;  Location: [facility] CATH INVASIVE LOCATION;  Service: Cardiology;  Laterality: N/A;    COLONOSCOPY  2012    Tics repeat exam in 10 years    COLONOSCOPY N/A 2022    Procedure: COLONOSCOPY WITH ANESTHESIA;  Surgeon: Juan Luis Young MD;  Location: [facility] ENDOSCOPY;  Service: Gastroenterology;  Laterality: N/A;  Pre:  screen  Post: normal  Randi Parks MD    CORONARY ARTERY BYPASS GRAFT      3 bypasses    ENDOSCOPY N/A 12/1/2022    Procedure: ESOPHAGOGASTRODUODENOSCOPY WITH ANESTHESIA;  Surgeon: Juan Luis Young MD;  Location:  [facility] ENDOSCOPY;  Service: Gastroenterology;  Laterality: N/A;  Pre: screen  Post: nml  Randi Parks MD    ENDOSCOPY N/A 6/27/2023    Procedure: ESOPHAGOGASTRODUODENOSCOPY WITH ANESTHESIA;  Surgeon: Juan Luis Young MD;  Location:  [facility] ENDOSCOPY;  Service: Gastroenterology;  Laterality: N/A;  preop: Gastroesophageal reflux disease  post op: hiatal hernia  PCP:Randi Parks MD    TRIGGER FINGER RELEASE Left 3/7/2023    Procedure: LEFT MIDDLE TRIGGER FINGER RELEASE;  Surgeon: Warren Houser MD;  Location: [facility] OR;  Service: Orthopedics;  Laterality: Left;      General Information       Row Name 01/19/24 1432          Physical Therapy Time and Intention    Document Type evaluation  Medical dx of arteriosclerosis of bypass graft of coronary artery, unstable chest pain d/t insufficient blood to heart, AFib, and HTN.  PMH includes CABGx3 many years ago. Pt admitted for planned  procedure on RCA on 1/18..  -LES (r) SHANTA (t) LES (c)     Mode of Treatment physical therapy  ... localized aortic disection notd during surgery. Imaging ordered and found Johnny type A aortic disection, bibasilar atelectasis, and mild cardiac enlargement with moderate to severe coronary artery  calcifications. No pericardial effusion.  -LES (r) SHANTA (t) LES (c)       Row Name 01/19/24 1436          General Information    Patient Profile Reviewed yes  -LES (r) SHANTA (t) LES (c)     Prior Level of Function independent:;all household mobility;community mobility;gait;transfer;bed mobility;ADL's  -LES (r) SHANTA (t) LES (c)     Existing Precautions/Restrictions no known precautions/restrictions  -LES (r) SHANTA (t) LES (c)     Barriers to Rehab none identified  -LES (r) SHANTA (t) LES (c)       Row Name 01/19/24 3788          Living Environment     People in Home spouse  -LES (r) JT (t) LES (c)       Row Name 01/19/24 1432          Home Main Entrance    Number of Stairs, Main Entrance one  -LES (r) JT (t) LES (c)     Stair Railings, Main Entrance none  -LES (r) JT (t) LES (c)       Row Name 01/19/24 1432          Stairs Within Home, Primary    Number of Stairs, Within Home, Primary twelve  -LES (r) JT (t) LES (c)     Stair Railings, Within Home, Primary railings safe and in good condition  -LES (r) JT (t) LES (c)       Row Name 01/19/24 1432          Cognition    Orientation Status (Cognition) oriented x 4  -LES (r) JT (t) LES (c)       Row Name 01/19/24 1432          Safety Issues, Functional Mobility    Safety Issues Affecting Function (Mobility) impulsivity  Excited to get out  of bed and ambulate  -LES (r) JT (t) LES (c)               User Key  (r) = Recorded By, (t) = Taken By, (c) = Cosigned By      Initials Name Provider Type    Aron Martin, PT DPT Physical Therapist    Anamaria Guerrero, PT Student PT Student                   Mobility       Row Name 01/19/24 1432          Bed Mobility    Bed Mobility scooting/bridging;supine-sit;sit-supine  -LES (r) JT (t) LES (c)     Scooting/Bridging [place] (Bed Mobility) independent  -LES (r) JT (t) LES (c)     Supine-Sit [place] (Bed Mobility) independent  -LES (r) JT (t) LES (c)     Sit-Supine [place] (Bed Mobility) independent  -LES (r) JT (t) LES (c)       Row Name 01/19/24 1432          Sit-Stand Transfer    Sit-Stand [place] (Transfers) supervision  -LES (r) JT (t) LES (c)       Row Name 01/19/24 1432          Gait/Stairs (Locomotion)    [place] Level (Gait) contact guard;standby assist  -LES (r) JT (t) LES (c)     Distance in Feet (Gait) 470 ft  -LES (r) JT (t) LES (c)               User Key  (r) = Recorded By, (t) = Taken By, (c) = Cosigned By      Initials Name Provider Type    rAon Martin, PT DPT Physical Therapist    Anamaria Guerrero, PT Student PT Student                    Obj/Interventions       Row Name 01/19/24 1432          Range of Motion Comprehensive    General Range of Motion bilateral lower extremity ROM WFL  -LES (r) JT (t) LES (c)       Row Name 01/19/24 1432          Strength Comprehensive (MMT)    General Manual Muscle Testing (MMT) Assessment no strength deficits identified  -LES (r) JT (t) LES (c)     Comment, General Manual Muscle Testing (MMT) Assessment BLE  MMT not performed; asessed through fnx mobility  -LES (r) JT (t) LES (c)       Row Name 01/19/24 1432          Balance    Balance Assessment sitting static balance;sitting dynamic balance;standing static balance;standing dynamic balance  -LES (r) JT (t) LES (c)     Static Sitting Balance independent  -LES (r) JT (t) LES (c)     Dynamic Sitting Balance supervision  -LES (r) JT (t) LES (c)     Position, Sitting Balance unsupported;sitting edge of bed  -LES (r) JT (t) LES (c)     Static Standing Balance supervision  -LES (r) JT (t) LES (c)     Dynamic Standing Balance standby assist;supervision  -LES (r) JT (t) LES (c)               User Key  (r) = Recorded By, (t) = Taken By, (c) = Cosigned By      Initials Name Provider Type    Aron Martin, PT DPT Physical Therapist    Anamaria Guerrero, PT Student PT Student                   Goals/Plan    No documentation.                  Clinical Impression       Row Name 01/19/24 1432          Pain    Pretreatment Pain Rating 0/10 - no pain  -LES (r) JT (t) LES (c)       Row Name 01/19/24 [facility]2          Plan of Care Review    Plan of Care Reviewed With patient;spouse  -LES (r) JT (t) LES (c)     Outcome Evaluation PT evaluated. Pt in fowlers position with spouse in room; A&Ox4. PLOF ind with ADLs, ambulation, step climbing, and community activities. Pt reports living in multilevel home with very infrequent use of stairs and reports walking 2miles 5 days/week. Pt highly motivated to ambulate during session. Pt performed bed mobility ind and able to sit EOB ind/supervision. sit>stand  transfer performed supervision. Pt ambulated up and down hallway a total of 470ft with L and R turns CGA/SBA and no LOB. Pt with no gait deviations noted. Pt returned to fowlers position independently. Pt with no reports of pain nor SOA. Skilled therapy not necessary at this time. Recommended D/C home with spouse. PT signing off.  -LES (r) JT (t) LES (c)       Row Name 01/19/24 1432          Therapy Assessment/Plan (PT)    Patient/Family Therapy Goals Statement (PT) Continue walking on treadmill at home.  -LES (r) JT (t) LES (c)     Criteria for Skilled Interventions Met (PT) no  -LES (r) JT (t) LES (c)     Therapy Frequency (PT) evaluation only  -LES       Row Name 01/19/24 1432          Positioning and Restraints    Pre-Treatment Position in bed  -LES (r) JT (t) LES (c)     Post Treatment Position bed  -LES (r) JT (t) LES (c)     In Bed fowlers;with family/caregiver;encouraged to call for assist;call light within reach  -LES (r) JT (t) LES (c)               User Key  (r) = Recorded By, (t) = Taken By, (c) = Cosigned By      Initials Name Provider Type    Aron Martin, PT DPT Physical Therapist    Anamaria Guerrero, PT Student PT Student                   Outcome Measures       Row Name 01/19/24 1432 01/19/24 0800       How much help from another person do you currently need...    Turning from your back to your side while in flat bed without using bedrails? 4  -LES (r) JT (t) LES (c) 3  -HT    Moving from lying on back to sitting on the side of a flat bed without bedrails? 4  -LES (r) JT (t) LES (c) 3  -HT    Moving to and from a bed to a chair (including a wheelchair)? 4  -LES (r) JT (t) LES (c) 3  -HT    Standing up from a chair using your arms (e.g., wheelchair, bedside chair)? 4  -LES (r) JT (t) LES (c) 3  -HT    Climbing 3-5 steps with a railing? 4  -LES (r) JT (t) LES (c) 3  -HT    To walk in hospital room? 4  -LES (r) SHANTA (t) LES (c) 3  -HT    AM-PAC 6 Clicks Score (PT) 24  -LES (r) SHANTA (t) 18  -HT    Highest Level of Mobility  Goal 8 --> Walked 250 feet or more  -LES (r) JT (t) 6 --> Walk 10 steps or more  -      Row Name 01/19/24 1432          Functional Assessment    Outcome Measure Options AM-PAC 6 Clicks Basic Mobility (PT)  -LES (r) JT (t) LES (c)               User Key  (r) = Recorded By, (t) = Taken By, (c) = Cosigned By      Initials Name Provider Type    Aron Martin, PT DPT Physical Therapist    Sofia Olivera, RN Registered Nurse    JAnamaria Peters, PT Student PT Student                  Physical Therapy Education       Title: PT OT SLP Therapies (Resolved)       Topic: Physical Therapy (Resolved)       Point: Mobility training (Resolved)       Learning Progress Summary             Patient YANY Castorena VU by SHANTA at 1/19/2024 1432    Comment: Instruct pt on reaching controlling lowering to seat when sitting. Instructed to wait for dr approval prior to continuing previous exercise regimen.   Significant Other YANY Castorena VU by SHANTA at 1/19/2024 1432    Comment: Instruct pt on reaching controlling lowering to seat when sitting. Instructed to wait for dr approval prior to continuing previous exercise regimen.                         Point: Home exercise program (Resolved)       Learner Progress:  Not documented in this visit.              Point: Body mechanics (Resolved)       Learner Progress:  Not documented in this visit.              Point: Precautions (Resolved)       Learning Progress Summary             Patient YANY Castorena VU by SHANTA at 1/19/2024 1432    Comment: Instruct pt on reaching controlling lowering to seat when sitting. Instructed to wait for dr approval prior to continuing previous exercise regimen.   Significant Other YANY Castorena VU by SHANTA at 1/19/2024 1432    Comment: Instruct pt on reaching controlling lowering to seat when sitting. Instructed to wait for dr approval prior to continuing previous exercise regimen.                                         User Key       Initials Effective Dates Name Provider Type  Discipline    SHANTA 11/30/23 -  Anamaria Partida, PT Student PT Student PT                  PT Recommendation and Plan     Plan of Care Reviewed With: patient, spouse  Outcome Evaluation: PT evaluated. Pt in fowlers position with spouse in room; A&Ox4. PLOF ind with ADLs, ambulation, step climbing, and community activities. Pt reports living in multilevel home with very infrequent use of stairs and reports walking 2miles 5 days/week. Pt highly motivated to ambulate during session. Pt performed bed mobility ind and able to sit EOB ind/supervision. sit>stand transfer performed supervision. Pt ambulated up and down hallway a total of 470ft with L and R turns CGA/SBA and no LOB. Pt with no gait deviations noted. Pt returned to fowlers position independently. Pt with no reports of pain nor SOA. Skilled therapy not necessary at this time. Recommended D/C home with spouse. PT signing off.     Time Calculation:         PT Charges       Row Name 01/19/24 1432             Time Calculation    Start Time 1432 (P)   -JT      Stop Time 1507 (P)   -JT      Time Calculation (min) 35 min (P)   -JT      PT Received On 01/19/24 (P)   -JT                User Key  (r) = Recorded By, (t) = Taken By, (c) = Cosigned By      Initials Name Provider Type    Anamaria Guerrero, PT Student PT Student                      PT G-Codes  Outcome Measure Options: AM-PAC 6 Clicks Basic Mobility (PT)  AM-PAC 6 Clicks Score (PT): 24    PT Discharge Summary  Anticipated Discharge Disposition (PT): home    Anamaria Partida PT Student  1/19/2024

## 2024-01-19 NOTE — PLAN OF CARE
Goal Outcome Evaluation:      Pt A&OX4. PERRLA. Follows commands. A fib - rate controlled. Systolic BP maintained <140. Room air, sat currently 94%. NPO. Blount in place - total of 850 out since 1645. NS currently infusing @ 100. R groin site clean, dry, intact and soft - no hematoma noted. Palpable pulses 2+. CT ordered in the AM. Lopressor 5mg IV given for HR >70.

## 2024-01-20 ENCOUNTER — APPOINTMENT (OUTPATIENT)
Dept: CT IMAGING | Facility: HOSPITAL | Age: 69
DRG: 321 | End: 2024-01-20
Payer: MEDICARE

## 2024-01-20 VITALS
HEART RATE: 63 BPM | HEIGHT: 69 IN | RESPIRATION RATE: 18 BRPM | DIASTOLIC BLOOD PRESSURE: 63 MMHG | WEIGHT: 211.19 LBS | BODY MASS INDEX: 31.28 KG/M2 | TEMPERATURE: 98.5 F | OXYGEN SATURATION: 99 % | SYSTOLIC BLOOD PRESSURE: 125 MMHG

## 2024-01-20 LAB
QT INTERVAL: 354 MS
QT INTERVAL: 384 MS
QTC INTERVAL: 411 MS
QTC INTERVAL: 426 MS

## 2024-01-20 PROCEDURE — 71275 CT ANGIOGRAPHY CHEST: CPT

## 2024-01-20 PROCEDURE — 99232 SBSQ HOSP IP/OBS MODERATE 35: CPT | Performed by: THORACIC SURGERY (CARDIOTHORACIC VASCULAR SURGERY)

## 2024-01-20 PROCEDURE — 99239 HOSP IP/OBS DSCHRG MGMT >30: CPT | Performed by: NURSE PRACTITIONER

## 2024-01-20 PROCEDURE — 25510000001 IOPAMIDOL PER 1 ML: Performed by: INTERNAL MEDICINE

## 2024-01-20 RX ORDER — ASPIRIN 81 MG/1
81 TABLET, CHEWABLE ORAL DAILY
Start: 2024-01-21

## 2024-01-20 RX ORDER — ATORVASTATIN CALCIUM 40 MG/1
40 TABLET, FILM COATED ORAL NIGHTLY
Qty: 90 TABLET | Refills: 3 | Status: SHIPPED | OUTPATIENT
Start: 2024-01-20 | End: 2024-01-26 | Stop reason: SDUPTHER

## 2024-01-20 RX ADMIN — FINASTERIDE 5 MG: 5 TABLET, FILM COATED ORAL at 08:48

## 2024-01-20 RX ADMIN — ASPIRIN 81 MG: 81 TABLET, CHEWABLE ORAL at 08:48

## 2024-01-20 RX ADMIN — IOPAMIDOL 100 ML: 755 INJECTION, SOLUTION INTRAVENOUS at 09:27

## 2024-01-20 RX ADMIN — PANTOPRAZOLE SODIUM 40 MG: 40 TABLET, DELAYED RELEASE ORAL at 05:22

## 2024-01-20 RX ADMIN — TICAGRELOR 60 MG: 60 TABLET ORAL at 05:22

## 2024-01-20 NOTE — PROGRESS NOTES
"Patient name: Pawel Thompson  Patient : 1955  VISIT # [id number]  MR #[id number]    Procedure:Procedure(s):  Chronic Total Occlussion  Procedure Date:2024  POD:2 Days Post-Op    Subjective   Shortness of breath      Documentation of being afebrile and not requiring oxygen.  Creatinine 0.7, potassium 4.1.  Family reports they took him down for CT scan.         Objective     Visit Vitals  /54 (BP Location: Left arm, Patient Position: Sitting)   Pulse 64   Temp 97.6 °F (36.4 °C) (Oral)   Resp 18   Ht 175.3 cm (69\")   Wt 95.8 kg (211 lb 3 oz)   SpO2 97%   BMI 31.19 kg/m²       Intake/Output Summary (Last 24 hours) at 2024 0655  Last data filed at 2024 1800  Gross per 24 hour   Intake 2324.87 ml   Output 550 ml   Net 1774.87 ml     LABS: NONE    IMAGES:       Imaging Results (Last 24 Hours)       Procedure Component Value Units Date/Time    CT Angiogram Chest [[id number]] Collected: 2458     Updated: 24    Narrative:      Exam: CT angiogram of the of the chest with intravenous contrast.  2024     CLINICAL HISTORY: Follow-up aortic dissection.     DOSE:  286.35 mGy.cm . All CT scans are performed using dose  optimization techniques as appropriate to the performed exam and  including at least one of the following: Automated exposure control,  adjustment of the mA and/or kV according to size, and the use of the  iterative reconstruction technique.     TECHNIQUE: Contiguous axial images were obtained through the thorax  following intravenous contrast administration with reformatted images  obtained in the sagittal and coronal projections from the original data  set. Three-dimensional reconstructions are also obtained.     COMPARISON: 2024     FINDINGS:     Pulmonary arteries:  There is normal enhancement of the pulmonary  arteries with no evidence of pulmonary thromboembolic disease.     Aorta: Again demonstrated is crescentic heterogeneous density along the  anterior " and lateral margin of the ascending thoracic aorta extending  from the aortic root to the mid ascending thoracic aorta. The length and  size of the dissection are stable to slightly diminished from  yesterday's exam with no evidence of further propagation. Previously the  false lumen measures approximately 5.3 cm in long axis measuring  approximately 4.8 cm on the current exam. The thickness of the false  lumen previously measured 10 mm now measuring 7 mm. There is mild  dilatation of the mid and distal ascending thoracic aorta stable from  the previous exam. There is atheromatous calcification of the aortic  arch and descending thoracic aorta with ectasia. No additional sites of  intimal flap or dissection are demonstrated. The proximal great vessels  are tortuous.     LUNGS: There is evidence of remote granulomatous disease. There is  bibasilar atelectasis. No acute consolidative pneumonia or effusion.     Pleural spaces: Unremarkable. No evidence of pleural effusion or  pneumothorax.     HEART: The heart is mildly enlarged. Severe coronary artery  calcifications are present. No evidence of right ventricular dysfunction  or pericardial effusion.     Bones: No acute osseous abnormalities are demonstrated.     CHEST WALL: No chest wall abnormalities are demonstrated.     Lymph nodes: No enlarged mediastinal or axillary lymphadenopathy is  present.     Upper abdomen: Limited images of the upper abdomen are unremarkable.       Impression:      1. Johnny type A aortic dissection. This is noted from the level of  the aortic root into the mid ascending thoracic aorta. The false lumen  is slightly diminished in length in transverse dimension when compared  to the previous exam. No propagation into the more distal ascending  thoracic aorta or aortic arch. No additional intimal flap is present.  Stable dilatation of the ascending thoracic aorta. There is ectasia of  the proximal great vessels.  2. Bibasilar atelectasis.  Lungs are otherwise clear except for remote  granulomatous disease. No consolidative pneumonia or effusion.  3. Mild cardiac enlargement with moderate to severe coronary artery  calcifications. No pericardial effusion.     This report was signed and finalized on 1/19/2024 7:14 AM by Dr. Carlos Palomares MD.             My image interpretation: Pending    Physical Exam:  Deferred    Impression:  Localized aortic dissection following  procedure  CAD  History of sternotomy, CABG  Class I severe obesity  Paroxysmal atrial fibrillation, in sinus rhythm  Acquired coagulation factor deficiency, eliquis on hold       Plan:  Dr. Lepe will review CTA once completed and discussed with patient and family  Will consider whether or not to resume anticoagulation after review of imaging  Dr. Lepe will provide update to cardiology after review  Discussed with family.  Patient getting CAT scan currently      Tere Selby, JOSETTE  01/20/24  06:55 CST

## 2024-01-20 NOTE — PROGRESS NOTES
[facility] -  Progress Note     LOS: 2 days   Patient Care Team:  Randi Parks MD as PCP - General (Internal Medicine)  Tony Sanabria MD as Consulting Physician (Urology)  Juan Luis Young MD as Consulting Physician (Gastroenterology)    Chief Complaint:Coronary Artery Disease     Subjective     Interval History:   Patient presented 1/18 for planned outpatient  procedure. He had procedure and had 3 drug eluting stents to RCA. He did have post op complications of dissection of aortic root. He has had daily CT scans. Dr. Lepe with CTS following. Eliquis has been on hold. No complaints. Cath site stable with no complaints.     Review of Systems:     Review of Systems   All other systems reviewed and are negative.    Objective     Vital Sign Min/Max for last 24 hours  Temp  Min: 97.5 °F (36.4 °C)  Max: 98.5 °F (36.9 °C)   BP  Min: 111/54  Max: 125/63   Pulse  Min: 59  Max: 67   Resp  Min: 18  Max: 18   SpO2  Min: 95 %  Max: 99 %   No data recorded   Weight  Min: 95.8 kg (211 lb 3 oz)  Max: 95.8 kg (211 lb 3 oz)         01/19/24  1702   Weight: 95.8 kg (211 lb 3 oz)       Telemetry: No acute events overnight. NSR      Physical Exam:    Constitutional:       Appearance: Well-developed.   Eyes:      Pupils: Pupils are equal, round, and reactive to light.   HENT:      Head: Normocephalic and atraumatic.   Neck:      Vascular: No carotid bruit or JVD.   Pulmonary:      Effort: Pulmonary effort is normal.      Breath sounds: Normal breath sounds.   Cardiovascular:      Normal rate. Regular rhythm.      Comments: Right cath site soft, minimal swelling  Pulses:     Intact distal pulses.   Edema:     Peripheral edema absent.   Abdominal:      General: Bowel sounds are normal.      Palpations: Abdomen is soft.   Musculoskeletal: Normal range of motion.      Cervical back: Normal range of motion and neck supple. Skin:     General: Skin is warm and dry.   Neurological:      Mental Status: Alert  and oriented to person, place, and time.      Deep Tendon Reflexes: Reflexes are normal and symmetric.   Psychiatric:         Behavior: Behavior normal.         Thought Content: Thought content normal.         Judgment: Judgment normal.       Results Review:   Lab Results (last 72 hours)       Procedure Component Value Units Date/Time    Basic Metabolic Panel [[id number]]  (Abnormal) Collected: 01/19/24 0314    Specimen: Blood Updated: 01/19/24 0409     Glucose 113 mg/dL      BUN 13 mg/dL      Creatinine 0.75 mg/dL      Sodium 138 mmol/L      Potassium 4.1 mmol/L      Chloride 105 mmol/L      CO2 20.0 mmol/L      Calcium 9.3 mg/dL      BUN/Creatinine Ratio 17.3     Anion Gap 13.0 mmol/L      eGFR 97.1 mL/min/1.73     Narrative:      GFR Normal >60  Chronic Kidney Disease <60  Kidney Failure <15      CBC (No Diff) [[id number]]  (Abnormal) Collected: 01/19/24 0314    Specimen: Blood Updated: 01/19/24 0403     WBC 13.20 10*3/mm3      RBC 4.99 10*6/mm3      Hemoglobin 14.4 g/dL      Hematocrit 42.7 %      MCV 85.6 fL      MCH 28.9 pg      MCHC 33.7 g/dL      RDW 13.2 %      RDW-SD 40.7 fl      MPV 10.5 fL      Platelets 229 10*3/mm3     Comprehensive Metabolic Panel [[id number]]  (Abnormal) Collected: 01/18/24 1050    Specimen: Blood Updated: 01/18/24 1127     Glucose 104 mg/dL      BUN 14 mg/dL      Creatinine 0.91 mg/dL      Sodium 140 mmol/L      Potassium 4.7 mmol/L      Chloride 105 mmol/L      CO2 27.0 mmol/L      Calcium 9.8 mg/dL      Total Protein 7.3 g/dL      Albumin 4.7 g/dL      ALT (SGPT) 22 U/L      AST (SGOT) 18 U/L      Alkaline Phosphatase 45 U/L      Total Bilirubin 0.4 mg/dL      Globulin 2.6 gm/dL      A/G Ratio 1.8 g/dL      BUN/Creatinine Ratio 15.4     Anion Gap 8.0 mmol/L      eGFR 90.7 mL/min/1.73     Narrative:      GFR Normal >60  Chronic Kidney Disease <60  Kidney Failure <15      CBC & Differential [[id number]]  (Normal) Collected: 01/18/24 1050    Specimen: Blood Updated: 01/18/24 1105     Narrative:      The following orders were created for panel order CBC & Differential.  Procedure                               Abnormality         Status                     ---------                               -----------         ------                     CBC Auto Differential[[id number]]        Normal              Final result                 Please view results for these tests on the individual orders.    CBC Auto Differential [[id number]]  (Normal) Collected: 01/18/24 1050    Specimen: Blood Updated: 01/18/24 1105     WBC 7.75 10*3/mm3      RBC 5.01 10*6/mm3      Hemoglobin 14.6 g/dL      Hematocrit 43.7 %      MCV 87.2 fL      MCH 29.1 pg      MCHC 33.4 g/dL      RDW 13.3 %      RDW-SD 42.1 fl      MPV 10.4 fL      Platelets 234 10*3/mm3      Neutrophil % 62.4 %      Lymphocyte % 25.7 %      Monocyte % 8.9 %      Eosinophil % 2.1 %      Basophil % 0.5 %      Immature Grans % 0.4 %      Neutrophils, Absolute 4.84 10*3/mm3      Lymphocytes, Absolute 1.99 10*3/mm3      Monocytes, Absolute 0.69 10*3/mm3      Eosinophils, Absolute 0.16 10*3/mm3      Basophils, Absolute 0.04 10*3/mm3      Immature Grans, Absolute 0.03 10*3/mm3      nRBC 0.0 /100 WBC                 Echo EF Estimated  Results for orders placed during the hospital encounter of 08/31/19    Adult Stress Echo W/ Cont or Stress Agent if Necessary Per Protocol    Interpretation Summary  · Low risk for ischemia.  · All left ventricular wall segments demonstrate appropriate increase in contractility with dobutamine infusion.       Medication Review: yes  Current Facility-Administered Medications   Medication Dose Route Frequency Provider Last Rate Last Admin    acetaminophen (TYLENOL) tablet 650 mg  650 mg Oral Q4H PRN Tato Lorenzana MD        apixaban (ELIQUIS) tablet 5 mg  5 mg Oral Q12H Toni Loera APRN        aspirin chewable tablet 81 mg  81 mg Oral Daily Mark Franklin MD   81 mg at 01/20/24 0848    atenolol (TENORMIN) tablet 25 mg  25 mg Oral  Nightly Tato Lorenzana MD   25 mg at 01/19/24 2010    atorvastatin (LIPITOR) tablet 40 mg  40 mg Oral Nightly LorenzanaTato almeida MD   40 mg at 01/19/24 2010    finasteride (PROSCAR) tablet 5 mg  5 mg Oral Daily Tato Lorenzana MD   5 mg at 01/20/24 0848    losartan (COZAAR) tablet 25 mg  25 mg Oral Nightly LorenzanaTato almeida MD   25 mg at 01/19/24 2010    metoprolol tartrate (LOPRESSOR) injection 5 mg  5 mg Intravenous Q6H PRN Tato Lorenzana MD   5 mg at 01/19/24 0021    nitroglycerin (NITROSTAT) SL tablet 0.4 mg  0.4 mg Sublingual Q5 Min PRN LorenzanaTato almeida MD   0.4 mg at 01/19/24 0654    nitroglycerin (TRIDIL) 200 mcg/ml infusion  5-200 mcg/min Intravenous Titrated LorenzanaTato almeida MD   Held at 01/18/24 1702    pantoprazole (PROTONIX) EC tablet 40 mg  40 mg Oral Q AM LorenzanaTato almeida MD   40 mg at 01/20/24 0522    tamsulosin (FLOMAX) 24 hr capsule 0.4 mg  0.4 mg Oral Nightly LorenzanaTato almeida MD   0.4 mg at 01/19/24 2010    ticagrelor (BRILINTA) tablet 60 mg  60 mg Oral BID Tato Lorenzana MD   60 mg at 01/20/24 0522         Assessment & Plan       Coronary artery disease involving coronary bypass graft of native heart with unstable angina pectoris    PAF (paroxysmal atrial fibrillation)    Primary hypertension    Mixed hyperlipidemia    Unstable angina    Plan:  Coronary Artery Disease - underwent  of RCA x 3 drug eluting stent. Aortic root dissection. Patient is stable post op. No compliants. On Brilinta 60 mg BID and Aspirin. Current plans to resume Eliquis this evening- however waiting CTS recommendations. If not going to resume Eliquis need to increase Brilinta dose to 90 mg BID.     Paroxysmal Atrial Fibrillation - maintaining NSR. He notes he is symptomatic when he has episodes. Eliquis currently on hold with plans to start this evening.     Hypertension- blood pressures stable    Hyperlipidemia - LDL at goal on last check. On Lipitor 40.     Further recommendations per Dr. Franklin. Awaiting review of CT by Dr. Lepe.        Electronically signed by JOSETTE Olivarez, 01/20/24, 12:53 PM CST.

## 2024-01-20 NOTE — PLAN OF CARE
Problem: Adult Inpatient Plan of Care  Goal: Plan of Care Review  Outcome: Ongoing, Progressing  Goal: Patient-Specific Goal (Individualized)  Outcome: Ongoing, Progressing  Goal: Absence of Hospital-Acquired Illness or Injury  Outcome: Ongoing, Progressing  Intervention: Identify and Manage Fall Risk  Recent Flowsheet Documentation  Taken 1/20/2024 0200 by Rosalia Juárez RN  Safety Promotion/Fall Prevention: safety round/check completed  Taken 1/20/2024 0000 by Rosalia Juárez RN  Safety Promotion/Fall Prevention: safety round/check completed  Taken 1/19/2024 2200 by Rosalia Juárez RN  Safety Promotion/Fall Prevention: safety round/check completed  Taken 1/19/2024 2100 by Rosalia Juárez RN  Safety Promotion/Fall Prevention: safety round/check completed  Taken 1/19/2024 2000 by Rosalia Juárez RN  Safety Promotion/Fall Prevention: safety round/check completed  Taken 1/19/2024 1900 by Rosalia Juárez RN  Safety Promotion/Fall Prevention: safety round/check completed  Intervention: Prevent Skin Injury  Recent Flowsheet Documentation  Taken 1/19/2024 2000 by Rosalia Juárez RN  Body Position: position changed independently  Intervention: Prevent and Manage VTE (Venous Thromboembolism) Risk  Recent Flowsheet Documentation  Taken 1/19/2024 2000 by Rosalia Juárez RN  Activity Management:   ambulated in room   ambulated to bathroom   back to bed  Range of Motion: active ROM (range of motion) encouraged  Goal: Optimal Comfort and Wellbeing  Outcome: Ongoing, Progressing  Intervention: Monitor Pain and Promote Comfort  Recent Flowsheet Documentation  Taken 1/19/2024 2200 by Rosalia Juárez RN  Pain Management Interventions: (pt refused) other (see comments)  Intervention: Provide Person-Centered Care  Recent Flowsheet Documentation  Taken 1/19/2024 2000 by Rosalia Juárez RN  Trust Relationship/Rapport:   care explained   choices provided  Goal: Readiness for Transition of Care  Outcome:  Ongoing, Progressing     Problem: Asthma Comorbidity  Goal: Maintenance of Asthma Control  Outcome: Ongoing, Progressing     Problem: Behavioral Health Comorbidity  Goal: Maintenance of Behavioral Health Symptom Control  Outcome: Ongoing, Progressing     Problem: COPD (Chronic Obstructive Pulmonary Disease) Comorbidity  Goal: Maintenance of COPD Symptom Control  Outcome: Ongoing, Progressing     Problem: Diabetes Comorbidity  Goal: Blood Glucose Level Within Targeted Range  Outcome: Ongoing, Progressing     Problem: Heart Failure Comorbidity  Goal: Maintenance of Heart Failure Symptom Control  Outcome: Ongoing, Progressing     Problem: Hypertension Comorbidity  Goal: Blood Pressure in Desired Range  Outcome: Ongoing, Progressing     Problem: Obstructive Sleep Apnea Risk or Actual Comorbidity Management  Goal: Unobstructed Breathing During Sleep  Outcome: Ongoing, Progressing     Problem: Osteoarthritis Comorbidity  Goal: Maintenance of Osteoarthritis Symptom Control  Outcome: Ongoing, Progressing  Intervention: Maintain Osteoarthritis Symptom Control  Recent Flowsheet Documentation  Taken 1/19/2024 2000 by Rosalia Juárez RN  Activity Management:   ambulated in room   ambulated to bathroom   back to bed     Problem: Pain Chronic (Persistent) (Comorbidity Management)  Goal: Acceptable Pain Control and Functional Ability  Outcome: Ongoing, Progressing  Intervention: Develop Pain Management Plan  Recent Flowsheet Documentation  Taken 1/19/2024 2200 by Rosalia Juárez, RN  Pain Management Interventions: (pt refused) other (see comments)     Problem: Seizure Disorder Comorbidity  Goal: Maintenance of Seizure Control  Outcome: Ongoing, Progressing     Problem: Skin Injury Risk Increased  Goal: Skin Health and Integrity  Outcome: Ongoing, Progressing     Problem: Fall Injury Risk  Goal: Absence of Fall and Fall-Related Injury  Outcome: Ongoing, Progressing  Intervention: Promote Injury-Free Environment  Recent  Flowsheet Documentation  Taken 1/20/2024 0200 by Rosalia Juárez RN  Safety Promotion/Fall Prevention: safety round/check completed  Taken 1/20/2024 0000 by Rosalia Juárez RN  Safety Promotion/Fall Prevention: safety round/check completed  Taken 1/19/2024 2200 by Rosalia Juárez RN  Safety Promotion/Fall Prevention: safety round/check completed  Taken 1/19/2024 2100 by Rosalia Juárez RN  Safety Promotion/Fall Prevention: safety round/check completed  Taken 1/19/2024 2000 by Rosalia Juárez RN  Safety Promotion/Fall Prevention: safety round/check completed  Taken 1/19/2024 1900 by Rosalia Juárez RN  Safety Promotion/Fall Prevention: safety round/check completed   Goal Outcome Evaluation:      NOC was uneventful. Pt is A&Ox4. He has been on RA. He was up and independent in his room throughout the night. Pt refused 0000 VS. Pt running NS @100. Plan is to restart eliquis in AM.Pt currently resting with eyes closed, wife is at bedside.

## 2024-01-22 ENCOUNTER — TELEPHONE (OUTPATIENT)
Dept: INTERNAL MEDICINE | Age: 69
End: 2024-01-22

## 2024-01-22 NOTE — TELEPHONE ENCOUNTER
Care Transitions Initial Follow Up Call    Outreach made within 2 business days of discharge: Yes    Patient: Edgar Tucker   Patient : 1955   MRN: [id number]   Reason for Admission: Dissection of aortic root.   Discharge Date: 24       Spoke with: Edgar Tucker    Discharge department/facility: [facility] Interactive Patient Contact:  Was patient able to fill all prescriptions: Yes  Was patient instructed to bring all medications to the follow-up visit: Yes  Is patient taking all medications as directed in the discharge summary? Yes  Does patient understand their discharge instructions: Yes  Does patient have questions or concerns that need addressed prior to 7-14 day follow up office visit: no    The patient denies any chest pain at this time. He reports his B/P was good in the hospital but he hasn't checked it since being home. His bowel, bladder and appetite are normal.    Scheduled appointment with PCP within 7-14 days    Follow Up  Future Appointments   Date Time Provider Department Center   2024 12:30 PM Lebuhn, Susu, MD [facility]   2024  8:00 AM Susu Trujillo MD [facility]       Vane Quintero MA

## 2024-01-25 ENCOUNTER — OFFICE VISIT (OUTPATIENT)
Dept: INTERNAL MEDICINE | Age: 69
End: 2024-01-25

## 2024-01-25 VITALS
HEART RATE: 54 BPM | DIASTOLIC BLOOD PRESSURE: 58 MMHG | BODY MASS INDEX: 32.88 KG/M2 | OXYGEN SATURATION: 99 % | HEIGHT: 69 IN | SYSTOLIC BLOOD PRESSURE: 118 MMHG | WEIGHT: 222 LBS

## 2024-01-25 DIAGNOSIS — I48.0 PAROXYSMAL ATRIAL FIBRILLATION (HCC): ICD-10-CM

## 2024-01-25 DIAGNOSIS — E78.2 MIXED HYPERLIPIDEMIA: Primary | ICD-10-CM

## 2024-01-25 DIAGNOSIS — I25.10 CORONARY ARTERY DISEASE INVOLVING NATIVE CORONARY ARTERY OF NATIVE HEART WITHOUT ANGINA PECTORIS: ICD-10-CM

## 2024-01-25 DIAGNOSIS — Z09 HOSPITAL DISCHARGE FOLLOW-UP: ICD-10-CM

## 2024-01-25 RX ORDER — ATORVASTATIN CALCIUM 40 MG/1
40 TABLET, FILM COATED ORAL NIGHTLY
COMMUNITY
Start: 2024-01-20

## 2024-01-25 ASSESSMENT — PATIENT HEALTH QUESTIONNAIRE - PHQ9
SUM OF ALL RESPONSES TO PHQ QUESTIONS 1-9: 0
SUM OF ALL RESPONSES TO PHQ9 QUESTIONS 1 & 2: 0
2. FEELING DOWN, DEPRESSED OR HOPELESS: 0
SUM OF ALL RESPONSES TO PHQ QUESTIONS 1-9: 0
1. LITTLE INTEREST OR PLEASURE IN DOING THINGS: 0
SUM OF ALL RESPONSES TO PHQ QUESTIONS 1-9: 0
SUM OF ALL RESPONSES TO PHQ QUESTIONS 1-9: 0

## 2024-01-25 NOTE — PROGRESS NOTES
Post-Discharge Transitional Care Management Progress Note      Edgar Tucker   YOB: 1955    Date of Office Visit:  1/25/2024  Date of Hospital Admission: 1/18/2024  Date of Hospital Discharge: 1/20/2024    Care management risk score Rising risk (score 2-5) and Complex Care (Scores >=6): No Risk Score On File     Non face to face  following discharge, date last encounter closed (first attempt may have been earlier): 01/22/2024 01/22/2024    Call initiated 2 business days of discharge: Yes    ASSESSMENT/PLAN:   Mixed hyperlipidemia  -     CBC; Future  -     Comprehensive Metabolic Panel; Future  -     Lipid Panel; Future  Coronary artery disease involving native coronary artery of native heart without angina pectoris  -     CBC; Future  -     Comprehensive Metabolic Panel; Future  -     Lipid Panel; Future  Paroxysmal atrial fibrillation (HCC)      Medical Decision Making: moderate complexity  Return in about 3 months (around 4/25/2024).         Subjective:   HPI:  Follow up of Hospital problems/diagnosis(es): Pt is here for TCM follow up recent admission for planned  of RCA- he had 3 stents but was noted to have a dissection of aortic root.  Now on brilinta and aspirin - eliquis on hold - doing ok.  Records reviewed.     Inpatient course: Discharge summary reviewed- see chart.    Interval history/Current status: Patient is stable since discharge.    Patient Active Problem List   Diagnosis    Essential hypertension    Benign prostatic hyperplasia with lower urinary tract symptoms    Elevated PSA    Hyperlipidemia    Coronary artery disease    Paroxysmal atrial fibrillation (HCC)    MICHELLE (obstructive sleep apnea)    Age-related cataract    Current use of long term anticoagulation    S/P CABG x 3    Esophagitis       Medications listed as ordered at the time of discharge from hospital     Medication List            Accurate as of January 25, 2024 12:54 PM. If you have any questions, ask your nurse or

## 2024-01-26 ENCOUNTER — TELEPHONE (OUTPATIENT)
Dept: CARDIOLOGY | Facility: CLINIC | Age: 69
End: 2024-01-26

## 2024-01-26 ENCOUNTER — OFFICE VISIT (OUTPATIENT)
Dept: CARDIOLOGY | Facility: CLINIC | Age: 69
End: 2024-01-26
Payer: MEDICARE

## 2024-01-26 VITALS
OXYGEN SATURATION: 99 % | BODY MASS INDEX: 32.73 KG/M2 | WEIGHT: 221 LBS | HEIGHT: 69 IN | SYSTOLIC BLOOD PRESSURE: 120 MMHG | DIASTOLIC BLOOD PRESSURE: 70 MMHG | HEART RATE: 53 BPM

## 2024-01-26 DIAGNOSIS — E78.2 MIXED HYPERLIPIDEMIA: ICD-10-CM

## 2024-01-26 DIAGNOSIS — I71.010 DISSECTION OF ASCENDING AORTA: Primary | ICD-10-CM

## 2024-01-26 DIAGNOSIS — I25.700 CORONARY ARTERY DISEASE INVOLVING CORONARY BYPASS GRAFT OF NATIVE HEART WITH UNSTABLE ANGINA PECTORIS: ICD-10-CM

## 2024-01-26 DIAGNOSIS — I48.0 PAF (PAROXYSMAL ATRIAL FIBRILLATION): ICD-10-CM

## 2024-01-26 DIAGNOSIS — I10 PRIMARY HYPERTENSION: ICD-10-CM

## 2024-01-26 PROCEDURE — 99214 OFFICE O/P EST MOD 30 MIN: CPT | Performed by: INTERNAL MEDICINE

## 2024-01-26 PROCEDURE — 1160F RVW MEDS BY RX/DR IN RCRD: CPT | Performed by: INTERNAL MEDICINE

## 2024-01-26 PROCEDURE — 3078F DIAST BP <80 MM HG: CPT | Performed by: INTERNAL MEDICINE

## 2024-01-26 PROCEDURE — 3074F SYST BP LT 130 MM HG: CPT | Performed by: INTERNAL MEDICINE

## 2024-01-26 PROCEDURE — 1159F MED LIST DOCD IN RCRD: CPT | Performed by: INTERNAL MEDICINE

## 2024-01-26 PROCEDURE — 93000 ELECTROCARDIOGRAM COMPLETE: CPT | Performed by: INTERNAL MEDICINE

## 2024-01-26 NOTE — PROGRESS NOTES
Referring Provider: Randi Parks MD    Reason for Follow-up Visit: CAD    Subjective .   Chief Complaint:   Chief Complaint   Patient presents with    Coronary Artery Disease     1 week hospital fu s/p cath 1/18/24.  Pt also will see Dr. Lepe.  Pt states he is resting like he was told.  He saw his pcp yesterday.    Hyperlipidemia     Last lab done 8/2023 LDL was 63 on Lipitor 40 mg       History of present illness:  Pawel Thompson is a 68 y.o. yo male with CAD, s/p CABG and  procedure last week in for routine follow up. He developed a dissection of the aorta during the  procedure which has been followed by Dr. Lepe and is expected to heal       History  Past Medical History:   Diagnosis Date    Acid reflux     Arthritis     Benign prostatic hyperplasia with lower urinary tract symptoms 12/06/2017    Coronary artery disease     COVID-19     Elevated PSA 12/06/2016    Hyperlipidemia     Hypertension     New onset atrial fibrillation 08/31/2019    Sleep apnea     does not wear machine   ,   Past Surgical History:   Procedure Laterality Date    APPENDECTOMY      CARDIAC CATHETERIZATION      CARDIAC CATHETERIZATION N/A 1/11/2024    Procedure: Coronary angiography;  Surgeon: Tato Lorenzana MD;  Location: [facility] CATH INVASIVE LOCATION;  Service: Cardiology;  Laterality: N/A;    CARDIAC CATHETERIZATION N/A 1/18/2024    Procedure: Chronic Total Occlussion;  Surgeon: Tato Lorenzana MD;  Location: [facility] CATH INVASIVE LOCATION;  Service: Cardiology;  Laterality: N/A;    COLONOSCOPY  09/11/2012    Tics repeat exam in 10 years    COLONOSCOPY N/A 12/1/2022    Procedure: COLONOSCOPY WITH ANESTHESIA;  Surgeon: Juan Luis Young MD;  Location: [facility] ENDOSCOPY;  Service: Gastroenterology;  Laterality: N/A;  Pre: screen  Post: normal  Randi Parks MD    CORONARY ARTERY BYPASS GRAFT      3 bypasses    ENDOSCOPY N/A 12/1/2022    Procedure: ESOPHAGOGASTRODUODENOSCOPY WITH ANESTHESIA;  Surgeon: Juan Luis Young MD;  Location:   [facility] ENDOSCOPY;  Service: Gastroenterology;  Laterality: N/A;  Pre: screen  Post: nml  Randi Parks MD    ENDOSCOPY N/A 6/27/2023    Procedure: ESOPHAGOGASTRODUODENOSCOPY WITH ANESTHESIA;  Surgeon: Juan Luis Young MD;  Location: [facility] ENDOSCOPY;  Service: Gastroenterology;  Laterality: N/A;  preop: Gastroesophageal reflux disease  post op: hiatal hernia  PCP:Randi Parks MD    TRIGGER FINGER RELEASE Left 3/7/2023    Procedure: LEFT MIDDLE TRIGGER FINGER RELEASE;  Surgeon: Warren Houser MD;  Location: [facility] OR;  Service: Orthopedics;  Laterality: Left;   ,   Family History   Problem Relation Age of Onset    Heart disease Mother     Stroke Mother     Prostate cancer Father     Heart disease Father     Heart attack Brother     Heart disease Brother     No Known Problems Brother     No Known Problems Maternal Grandmother     No Known Problems Maternal Grandfather     Heart disease Paternal Grandmother     No Known Problems Paternal Grandfather     Colon cancer Neg Hx     Colon polyps Neg Hx    ,   Social History     Tobacco Use    Smoking status: Never    Smokeless tobacco: Never   Vaping Use    Vaping Use: Never used   Substance Use Topics    Alcohol use: Yes     Alcohol/week: 1.0 standard drink of alcohol     Types: 1 Cans of beer per week     Comment: occasionally    Drug use: Never   ,     Medications  Current Outpatient Medications   Medication Sig Dispense Refill    aspirin 81 MG chewable tablet Chew 1 tablet Daily.      atenolol (TENORMIN) 25 MG tablet Take 1 tablet by mouth Every Night.      atorvastatin (LIPITOR) 40 MG tablet Take 1 tablet by mouth Every Night. 90 tablet 3    finasteride (PROSCAR) 5 MG tablet Take 1 tablet by mouth Daily. 90 tablet 3    losartan (COZAAR) 25 MG tablet Take 1 tablet by mouth Every Night.      Omega-3 Fatty Acids (fish oil) 1000 MG capsule capsule Take 1 capsule by mouth Every Night.      omeprazole (priLOSEC) 20 MG capsule Take 1 capsule by mouth 2 (Two) Times a  "Day.      PATIENT SUPPLIED MEDICATION Take 2 tablets by mouth Every Night. 4Life Transfer Factor Plus vitamin with Zinc      tamsulosin (FLOMAX) 0.4 MG capsule 24 hr capsule Take 1 capsule by mouth Every Night. 90 capsule 3    ticagrelor (BRILINTA) 90 MG tablet tablet Take 1 tablet by mouth 2 (Two) Times a Day. 60 tablet 11     No current facility-administered medications for this visit.       Allergies:  Ramipril    Review of Systems  Review of Systems   HENT:  Negative for nosebleeds.    Cardiovascular:  Negative for chest pain, claudication, dyspnea on exertion, leg swelling, near-syncope, orthopnea, palpitations, paroxysmal nocturnal dyspnea and syncope.   Respiratory:  Negative for hemoptysis and shortness of breath.    Gastrointestinal:  Negative for melena.   Genitourinary:  Negative for hematuria.       Objective     Physical Exam:  /70   Pulse 53   Ht 175.3 cm (69\")   Wt 100 kg (221 lb)   SpO2 99%   BMI 32.64 kg/m²   Pulmonary:      Effort: Pulmonary effort is normal.      Breath sounds: Normal breath sounds.   Cardiovascular:      Normal rate. Regular rhythm.      Murmurs: There is no murmur.   Edema:     Peripheral edema absent.         Results Review:    ECG 12 Lead    Date/Time: 1/26/2024 11:28 AM  Performed by: Tato Lorenzana MD    Authorized by: Tato Lorenzana MD  Comparison: compared with previous ECG   Similar to previous ECG  Rhythm: sinus rhythm  Rate: normal  Conduction: conduction normal  ST Segments: ST segments normal  T Waves: T waves normal  QRS axis: normal    Clinical impression: normal ECG          Admission on 01/18/2024, Discharged on 01/20/2024   Component Date Value Ref Range Status    Glucose 01/18/2024 104 (H)  65 - 99 mg/dL Final    BUN 01/18/2024 14  8 - 23 mg/dL Final    Creatinine 01/18/2024 0.91  0.76 - 1.27 mg/dL Final    Sodium 01/18/2024 140  136 - 145 mmol/L Final    Potassium 01/18/2024 4.7  3.5 - 5.2 mmol/L Final    Chloride 01/18/2024 105  98 - 107 mmol/L " Final    CO2 01/18/2024 27.0  22.0 - 29.0 mmol/L Final    Calcium 01/18/2024 9.8  8.6 - 10.5 mg/dL Final    Total Protein 01/18/2024 7.3  6.0 - 8.5 g/dL Final    Albumin 01/18/2024 4.7  3.5 - 5.2 g/dL Final    ALT (SGPT) 01/18/2024 22  1 - 41 U/L Final    AST (SGOT) 01/18/2024 18  1 - 40 U/L Final    Alkaline Phosphatase 01/18/2024 45  39 - 117 U/L Final    Total Bilirubin 01/18/2024 0.4  0.0 - 1.2 mg/dL Final    Globulin 01/18/2024 2.6  gm/dL Final    A/G Ratio 01/18/2024 1.8  g/dL Final    BUN/Creatinine Ratio 01/18/2024 15.4  7.0 - 25.0 Final    Anion Gap 01/18/2024 8.0  5.0 - 15.0 mmol/L Final    eGFR 01/18/2024 90.7  >60.0 mL/min/1.73 Final    WBC 01/18/2024 7.75  3.40 - 10.80 10*3/mm3 Final    RBC 01/18/2024 5.01  4.14 - 5.80 10*6/mm3 Final    Hemoglobin 01/18/2024 14.6  13.0 - 17.7 g/dL Final    Hematocrit 01/18/2024 43.7  37.5 - 51.0 % Final    MCV 01/18/2024 87.2  79.0 - 97.0 fL Final    MCH 01/18/2024 29.1  26.6 - 33.0 pg Final    MCHC 01/18/2024 33.4  31.5 - 35.7 g/dL Final    RDW 01/18/2024 13.3  12.3 - 15.4 % Final    RDW-SD 01/18/2024 42.1  37.0 - 54.0 fl Final    MPV 01/18/2024 10.4  6.0 - 12.0 fL Final    Platelets 01/18/2024 234  140 - 450 10*3/mm3 Final    Neutrophil % 01/18/2024 62.4  42.7 - 76.0 % Final    Lymphocyte % 01/18/2024 25.7  19.6 - 45.3 % Final    Monocyte % 01/18/2024 8.9  5.0 - 12.0 % Final    Eosinophil % 01/18/2024 2.1  0.3 - 6.2 % Final    Basophil % 01/18/2024 0.5  0.0 - 1.5 % Final    Immature Grans % 01/18/2024 0.4  0.0 - 0.5 % Final    Neutrophils, Absolute 01/18/2024 4.84  1.70 - 7.00 10*3/mm3 Final    Lymphocytes, Absolute 01/18/2024 1.99  0.70 - 3.10 10*3/mm3 Final    Monocytes, Absolute 01/18/2024 0.69  0.10 - 0.90 10*3/mm3 Final    Eosinophils, Absolute 01/18/2024 0.16  0.00 - 0.40 10*3/mm3 Final    Basophils, Absolute 01/18/2024 0.04  0.00 - 0.20 10*3/mm3 Final    Immature Grans, Absolute 01/18/2024 0.03  0.00 - 0.05 10*3/mm3 Final    nRBC 01/18/2024 0.0  0.0 - 0.2 /100  WBC Final    QT Interval 01/18/2024 354  ms Final    QTC Interval 01/18/2024 411  ms Final    WBC 01/19/2024 13.20 (H)  3.40 - 10.80 10*3/mm3 Final    RBC 01/19/2024 4.99  4.14 - 5.80 10*6/mm3 Final    Hemoglobin 01/19/2024 14.4  13.0 - 17.7 g/dL Final    Hematocrit 01/19/2024 42.7  37.5 - 51.0 % Final    MCV 01/19/2024 85.6  79.0 - 97.0 fL Final    MCH 01/19/2024 28.9  26.6 - 33.0 pg Final    MCHC 01/19/2024 33.7  31.5 - 35.7 g/dL Final    RDW 01/19/2024 13.2  12.3 - 15.4 % Final    RDW-SD 01/19/2024 40.7  37.0 - 54.0 fl Final    MPV 01/19/2024 10.5  6.0 - 12.0 fL Final    Platelets 01/19/2024 229  140 - 450 10*3/mm3 Final    Glucose 01/19/2024 113 (H)  65 - 99 mg/dL Final    BUN 01/19/2024 13  8 - 23 mg/dL Final    Creatinine 01/19/2024 0.75 (L)  0.76 - 1.27 mg/dL Final    Sodium 01/19/2024 138  136 - 145 mmol/L Final    Potassium 01/19/2024 4.1  3.5 - 5.2 mmol/L Final    Chloride 01/19/2024 105  98 - 107 mmol/L Final    CO2 01/19/2024 20.0 (L)  22.0 - 29.0 mmol/L Final    Calcium 01/19/2024 9.3  8.6 - 10.5 mg/dL Final    BUN/Creatinine Ratio 01/19/2024 17.3  7.0 - 25.0 Final    Anion Gap 01/19/2024 13.0  5.0 - 15.0 mmol/L Final    eGFR 01/19/2024 97.1  >60.0 mL/min/1.73 Final    QT Interval 01/19/2024 384  ms Final    QTC Interval 01/19/2024 426  ms Final       Assessment & Plan   Problem List Items Addressed This Visit          Cardiac and Vasculature    PAF (paroxysmal atrial fibrillation)    Current Assessment & Plan     No recent recurrence         Coronary artery disease involving coronary bypass graft of native heart with unstable angina pectoris    Current Assessment & Plan     Doing well after  procedure to the RCA         Primary hypertension    Current Assessment & Plan     Good control         Mixed hyperlipidemia    Current Assessment & Plan     Adequate response to treatment         Dissection of ascending aorta - Primary    Current Assessment & Plan     Complication of  procedure but this will  probably heal without consequence. He has another CTA of the aorta scheduled for next week         Relevant Orders    CT Angiogram Chest       Medical Complexity  must have 1 out of 3     Moderate Complexity Level 4           1 of the following medical problems:          []One chronic illness with mild exacerbation         [x]Two or more stable chronic illness          []One new problem  []One acute illness with systemic symptoms    Complexity of Data  Reviewed (1 out of the 3 following categories)      Category 1 tests, documents, historian (must have 3 points)       []Review of prior external records  [x]Review of results of unique tests  [x]Ordering unique tests  []Assessment requires an independent historian    Category 2 Interpretation of tests   []Independent interpretation of test read by another doc    Category 3 Discuss Management/tests  []Discussion with external physician    Risk of complications and/or morbidity          [x]Prescription Drug Management

## 2024-01-26 NOTE — TELEPHONE ENCOUNTER
Caller: Pawel Thompson    Relationship: Self    Best call back number: [phone]     What is the medical concern/diagnosis: TEAR IN AORTA    What specialty or service is being requested: CARDIOTHORARCIC SURGERY    What is the provider, practice or medical service name: DR. SY    What is the office location: [address] [place] [address], [address] [place] [address]    What is the office phone number: [phone]    Any additional details: PLEASE LET PT KNOW WHEN REFERRAL IS MADE

## 2024-01-26 NOTE — ASSESSMENT & PLAN NOTE
Complication of  procedure but this will probably heal without consequence. He has another CTA of the aorta scheduled for next week

## 2024-01-28 RX ORDER — ATORVASTATIN CALCIUM 40 MG/1
40 TABLET, FILM COATED ORAL NIGHTLY
Qty: 90 TABLET | Refills: 3 | Status: SHIPPED | OUTPATIENT
Start: 2024-01-28

## 2024-01-29 ENCOUNTER — TELEPHONE (OUTPATIENT)
Dept: CARDIAC SURGERY | Facility: CLINIC | Age: 69
End: 2024-01-29
Payer: MEDICARE

## 2024-01-29 NOTE — TELEPHONE ENCOUNTER
----- Message from Indra Lepe MD sent at 1/29/2024  9:10 AM CST -----  Patient is getting CT chest tomorrow.  Please place on my clinic for this Wednesday.    Thanks

## 2024-01-30 ENCOUNTER — HOSPITAL ENCOUNTER (OUTPATIENT)
Dept: CT IMAGING | Facility: HOSPITAL | Age: 69
Discharge: HOME | End: 2024-01-30
Admitting: INTERNAL MEDICINE
Payer: MEDICARE

## 2024-01-30 DIAGNOSIS — I71.010 DISSECTION OF ASCENDING AORTA: ICD-10-CM

## 2024-01-30 PROCEDURE — 71275 CT ANGIOGRAPHY CHEST: CPT

## 2024-01-30 PROCEDURE — 25510000001 IOPAMIDOL PER 1 ML: Performed by: INTERNAL MEDICINE

## 2024-01-30 RX ADMIN — IOPAMIDOL 100 ML: 755 INJECTION, SOLUTION INTRAVENOUS at 15:51

## 2024-01-31 ENCOUNTER — OFFICE VISIT (OUTPATIENT)
Dept: CARDIAC SURGERY | Facility: CLINIC | Age: 69
End: 2024-01-31
Payer: MEDICARE

## 2024-01-31 VITALS
DIASTOLIC BLOOD PRESSURE: 66 MMHG | OXYGEN SATURATION: 96 % | WEIGHT: 215.6 LBS | SYSTOLIC BLOOD PRESSURE: 104 MMHG | BODY MASS INDEX: 31.93 KG/M2 | HEIGHT: 69 IN | HEART RATE: 58 BPM

## 2024-01-31 DIAGNOSIS — I71.010 DISSECTION OF ASCENDING AORTA: Primary | ICD-10-CM

## 2024-01-31 PROCEDURE — 3078F DIAST BP <80 MM HG: CPT | Performed by: THORACIC SURGERY (CARDIOTHORACIC VASCULAR SURGERY)

## 2024-01-31 PROCEDURE — 3074F SYST BP LT 130 MM HG: CPT | Performed by: THORACIC SURGERY (CARDIOTHORACIC VASCULAR SURGERY)

## 2024-01-31 PROCEDURE — 1160F RVW MEDS BY RX/DR IN RCRD: CPT | Performed by: THORACIC SURGERY (CARDIOTHORACIC VASCULAR SURGERY)

## 2024-01-31 PROCEDURE — 1159F MED LIST DOCD IN RCRD: CPT | Performed by: THORACIC SURGERY (CARDIOTHORACIC VASCULAR SURGERY)

## 2024-01-31 PROCEDURE — 99213 OFFICE O/P EST LOW 20 MIN: CPT | Performed by: THORACIC SURGERY (CARDIOTHORACIC VASCULAR SURGERY)

## 2024-01-31 NOTE — PROGRESS NOTES
Subjective   Patient ID: Pawel hTompson is a 68 y.o. male who is here for follow-up of a known aneurysm. He is accompanied by his spouse.  Chief Complaint   Patient presents with    Dissection of Ascending Aorta     New patient appointment s/p testing     History of Present Illness    Mr. Thompson reports doing well.  Patient denies any chest pain.  He has been taking collagen supplementation daily.  Patient was previously taking Eliquis twice daily.  At that time, he was not taking aspirin because he bruises easily.  He has refrained from driving a vehicle.  Patient does not lift weights, but he enjoys walking on the treadmill.  He has refrained from going out in public to avoid acquiring COVID-19.  Patient's brother underwent 2 bypass surgeries and passed away at age 67.    The following portions of the patient's history were reviewed and updated as appropriate: allergies, current medications, past family history, past medical history, past social history, past surgical history and problem list.       Objective   Physical Exam  Constitutional:       Comments: Appropriate, alert, in no acute distress. No chest pain.   Cardiovascular:      Comments: Heart is regular rate and rhythm without murmurs, rubs, or gallops.  Pulmonary:      Comments: Clear to auscultation bilaterally without wheezing, rubs, or rales.  Abdominal:      Comments: Soft, nondistended, nontender.   Musculoskeletal:      Comments: No clubbing, cyanosis, or edema.           CT Angiogram Chest    Result Date: 1/30/2024  Narrative: EXAM/TECHNIQUE: CT chest angiography with 3D MIP images without and with IV contrast  INDICATION: follow up of aortic dissection; I71.010-Dissection of ascending aorta  COMPARISON: 1/20/2024  DLP: 621.84 mGy.cm. Automated exposure control was also utilized to decrease patient radiation dose.  FINDINGS:  Aortic root is nondilated. No change in mild dilatation of the ascending aorta measure up to 4.1 cm diameter. No change in  mild eccentric thickening around the right anterior aspect of the ascending aorta just beyond the aortic root related to chronically thrombosed dissection. Normal caliber aortic arch and descending thoracic aorta. Aortic arch branch origins are widely patent. No central pulmonary artery filling defect. Heavy coronary artery atherosclerotic calcification. No pericardial effusion.  Central airways are clear. No consolidation or pleural effusion. No suspicious pulmonary nodule. No enlarged axillary, supraclavicular, or mediastinal lymph nodes.  No large thyroid nodule. No acute chest wall soft tissue abnormality. No acute finding in the included portion of the upper abdomen. No acute osseous finding.      Impression:  1.  Stable exam. No change in appearance of mild eccentric thickening around the right anterior aspect of the proximal ascending aorta related to chronically thrombosed dissection. No change in mild dilatation of the send aorta measuring 4.1 cm diameter.  2.  Heavy coronary artery atherosclerotic calcification.  This report was signed and finalized on 1/30/2024 4:25 PM by Dr. Aayush Taylor MD.      CT Angiogram Chest    Result Date: 1/20/2024  Narrative: EXAMINATION: CT ANGIOGRAM CHEST- 1/20/2024 9:34 AM  HISTORY: localized aortic dissection after  procedure; I25.700-Atherosclerosis of coronary artery bypass graft(s), unspecified, with unstable angina pectoris  TOTAL DOSE: 507.1 mGy.cm (Automatic exposure control technique was implemented in an effort to keep the radiation dose as low as possible without compromising image quality)  REPORT: Spiral CT of the chest was performed after administration of intravenous contrast from the thoracic inlet through the upper abdomen using CTA protocol, which includes reconstructed maximum intensity projection (MIP)coronal and sagittal images.  Comparison: CTA chest 1/19/2024.  The contrast bolus is satisfactory. There is a focal dissection involving the  ascending aorta along the right lateral side, compatible with type a dissection. The false lumen shows no contrast enhancement and now appears occluded. The false lumen extends approximately 5.5 cm in length, with a maximum diameter of 10 mm. This is unchanged there is mild dilation of the ascending aorta with a diameter of 4.0 cm as before. There is normal caliber of the thoracic aortic arch and descending thoracic aorta. The pulmonary arteries are normal in caliber without filling defects. Previous median sternotomy is noted. There is heavy calcified plaque within the native coronary arteries. The thyroid gland appears homogeneous and normal. No intrathoracic lymphadenopathy is identified. There is no pleural effusion. Review of lung windows demonstrates normal aeration of the lungs. No pneumothorax is identified. The visualized upper abdomen is unremarkable, except for hepatic steatosis. Review of bone windows is unremarkable.      Impression: 1. Limited type A thoracic aortic dissection along the right lateral margin of the ascending aorta appears similar in size and shape, the false lumen is now thrombosed. The true lumen enhances normally, there is mild dilation of the ascending aorta, with a diameter of 4 cm as before. 2. Previous median sternotomy. Heavy calcified plaque is present within the native coronary arteries.     This report was signed and finalized on 1/20/2024 9:45 AM by Dr. Tato Pickett MD.      CT Angiogram Chest    Result Date: 1/19/2024  Narrative: Exam: CT angiogram of the of the chest with intravenous contrast. 1/19/2024  CLINICAL HISTORY: Follow-up aortic dissection.  DOSE:  286.35 mGy.cm . All CT scans are performed using dose optimization techniques as appropriate to the performed exam and including at least one of the following: Automated exposure control, adjustment of the mA and/or kV according to size, and the use of the iterative reconstruction technique.  TECHNIQUE: Contiguous  axial images were obtained through the thorax following intravenous contrast administration with reformatted images obtained in the sagittal and coronal projections from the original data set. Three-dimensional reconstructions are also obtained.  COMPARISON: 1/18/2024  FINDINGS:  Pulmonary arteries:  There is normal enhancement of the pulmonary arteries with no evidence of pulmonary thromboembolic disease.  Aorta: Again demonstrated is crescentic heterogeneous density along the anterior and lateral margin of the ascending thoracic aorta extending from the aortic root to the mid ascending thoracic aorta. The length and size of the dissection are stable to slightly diminished from yesterday's exam with no evidence of further propagation. Previously the false lumen measures approximately 5.3 cm in long axis measuring approximately 4.8 cm on the current exam. The thickness of the false lumen previously measured 10 mm now measuring 7 mm. There is mild dilatation of the mid and distal ascending thoracic aorta stable from the previous exam. There is atheromatous calcification of the aortic arch and descending thoracic aorta with ectasia. No additional sites of intimal flap or dissection are demonstrated. The proximal great vessels are tortuous.  LUNGS: There is evidence of remote granulomatous disease. There is bibasilar atelectasis. No acute consolidative pneumonia or effusion.  Pleural spaces: Unremarkable. No evidence of pleural effusion or pneumothorax.  HEART: The heart is mildly enlarged. Severe coronary artery calcifications are present. No evidence of right ventricular dysfunction or pericardial effusion.  Bones: No acute osseous abnormalities are demonstrated.  CHEST WALL: No chest wall abnormalities are demonstrated.  Lymph nodes: No enlarged mediastinal or axillary lymphadenopathy is present.  Upper abdomen: Limited images of the upper abdomen are unremarkable.      Impression: 1. [place] type A aortic  dissection. This is noted from the level of the aortic root into the mid ascending thoracic aorta. The false lumen is slightly diminished in length in transverse dimension when compared to the previous exam. No propagation into the more distal ascending thoracic aorta or aortic arch. No additional intimal flap is present. Stable dilatation of the ascending thoracic aorta. There is ectasia of the proximal great vessels. 2. Bibasilar atelectasis. Lungs are otherwise clear except for remote granulomatous disease. No consolidative pneumonia or effusion. 3. Mild cardiac enlargement with moderate to severe coronary artery calcifications. No pericardial effusion.  This report was signed and finalized on 1/19/2024 7:14 AM by Dr. Carlos Palomares MD.      CT Angiogram Chest    Result Date: 1/18/2024  Narrative: EXAMINATION: CT ANGIOGRAM CHEST-   1/18/2024 3:26 PM  HISTORY: aortic dissection; I25.700-Atherosclerosis of coronary artery bypass graft(s), unspecified, with unstable angina pectoris  In order to have a CT radiation dose as low as reasonably achievable Automated Exposure Control was utilized for adjustment of the mA and/or KV according to patient size.  CT Dose DLP = 484.75 mGy.cm. (If there are multiple studies performed at the same time this represents the total dose).  Chest CT angiography without and with IV contrast. CT angiography protocol. CT imaging with bolus IV contrast injection. Under concurrent supervision axial, sagittal, coronal, three-dimensional, and MIP data sets were constructed on an independent work station.  Heart size is within normal limits. CABG changes. Negative coronary artery calcification. Curvilinear hyperdensity at the root of the thoracic aorta and extending into the ascending aorta over a length of 53 mm. This crescentic hyperdense collection measures 35 mm AP and up to 10 mm in thickness. Given the history of aortic dissection this apparently represents extravasated intramural  contrast within the proximal thoracic aorta.  The ascending aorta diameter at the level of the RIGHT pulmonary artery is 41 x 42 mm on axial image 66. The root of the aorta at the level of the valve measures 38 mm on coronal image 60.  There is no dissection flap within the aortic arch or descending thoracic aorta. The great vessel origins are normally patent.  Chronic lung changes with mild dependent atelectasis. No pneumonia, pneumothorax, or heart failure.      Impression: 1. Crescentic hyperdense collection within the proximal thoracic aorta compatible with focal dissection and extravasated intramural contrast.  This report was signed and finalized on 1/18/2024 4:53 PM by Dr. Chadd Monzon MD.      Cardiac Catheterization/Vascular Study    Result Date: 1/18/2024  Narrative: Procedure: Conscious sedation,  to the RCA Risk, Benefits, and Alternatives discussed with the patient and/or family.  Plan is for moderate sedation, and the patient agrees to proceed with the procedure.  An immediate assessment was done prior to the administration of moderate sedation. Indications: Unstable angina Type and site of entry: percutaneous right femoral artery using the Seldinger technique Contrast used: Isovue Selective Coronary Angiography:  The right coronary artery is a large dominant vessel that is totally occluded in the mid portion. Collateral flow is seen from right to right collaterals.  of the RCA Guide 7Fr JR4, guidewires multiple Comment: A wire was placed into the subintimal space and then successfully re-entered into the distal true lumen. The vessel was sequentially dilated with a 3.0 balloon. A 2.5 x 38 mm synergy drug-eluting stent was then deployed in the distal coronary artery at 12 tarah for 30 seconds. This was followed by a 2.75 x 32 mm synergy LUIS in similar fashion. Finally, a 3.0 x 24 Synergy LUIS was placed in the proximal RCA to the ostium. This resulted in no residual stenosis and FLAKITO-3 flow  distally however there was evidence of a retrograde dissection into the aortic root. Conscious Sedation I supervised the administration of conscious sedation by the nursing staff. The first dose was given at 1340 and the end of the face to face encounter was at 1603. During this time, BP, HR, oximetry, and neurologic status were closely monitored. Conclusion: #1 status post successful  of the RCA using an antegrade approach using 3 drug eluting stents. #2 nonflow limiting aortic dissection Final Comment: Pt tolerated the procedure well. There were no obvious complications. Hemostasis was achieved with a 6F perclose hemostatic device. A CT angiogram of the chest is pending as well as a CT surgery consult     Cardiac Catheterization/Vascular Study    Result Date: 1/11/2024  Narrative: Procedure: Conscious sedation, Left heart catheterization, ventriculography, selective coronary angiography, angiography of bypass grafts and attempted PCI of the SVG to the RCA Risk, Benefits, and Alternatives discussed with the patient and/or family.  Plan is for moderate sedation, and the patient agrees to proceed with the procedure.  An immediate assessment was done prior to the administration of moderate sedation. Indications: Unstable angina Type and site of entry: percutaneous right femoral artery using the Seldinger technique Contrast used: Isovue Catheters used: 5 Turkmen pigtail, JL4, JR4 Findings: Hemodynamics:    #1 left ventricular pressure 140/15        #2 aortic pressure 140/60 (91)        #3 There was no significant gradient on aortic valve pullback Ventriculography: Left ventricular left ventricular ejection fraction is 65%.  No significant regional wall motion or mitral regurgitation is observed. Selective Coronary Angiography: #1.  The left main coronary artery is an average size vessel with a 90% eccentric stenosis distally #2.  The left anterior descending coronary artery is a type III vessel with one diagonal  branch.  The LAD is subtotally occluded in the mid portion with competitive flow seen distally #3.  The left circumflex coronary artery is an average size vessel with one large obtuse marginal branch.  There is a 90% stenosis at the ostium of the OM with competitive flow seen in the distal OM #4  The right coronary artery is a large dominant vessel that is totally occluded in the mid portion. Collaterals to the distal RCA are seen via the conus branch which has a separate ostium Angiography of SVG's and LIMA: The LIMA graft is widely patent to the LAD The SVG to the OM is widely patent The SVG to the RCA is totally occluded at the ostium Attempted PCI of the SVG to the RCA Guide 6 Fr multipurpose, guide wire Hornet Comment: Numerous attempts were made to pass the wire through the occluded SVG but all were unsuccessful Conscious Sedation I supervised the administration of conscious sedation by the nursing staff. The first dose was given at 1147 and the end of the face to face encounter was at 1223. During this time, BP, HR, oximetry, and neurologic status were closely monitored. Conclusion: #1 patent SVG to the OM #2 patent LIMA to the LAD #3 occluded SVG to the RCA #4  of the RCA #5 left ventricular ejection fraction 65% Final Comment: Pt tolerated the procedure well. There were no obvious complications. Hemostasis was achieved with a 6F perclose hemostatic device. He will return in 1 week for a  procedure of the RCA     Stress Test With Myocardial Perfusion (1 Day)    Result Date: 1/4/2024  Narrative:   Impressions are consistent with an intermediate risk study.   Left ventricular ejection fraction is normal (Calculated EF = 68%).      Diagnoses and all orders for this visit:    1. Dissection of ascending aorta (Primary)  -     CT angiogram chest w contrast; Future          Assessment & Plan      Independent interpretation of CT angiogram of chest obtained 01/31/2024:  A thrombosed dissection remains stable.  The aortic root measures 4.1cm in size.      Impression:  Stable thrombosed false lumen type A dissection, post left heart catheterization.  Coronary artery disease.  Known ascending aortic aneurysm.    Medical decision making/Recommendations/Plan:  Many thanks for the opportunity to aid in the care of your patient, Dr. Lorenzana. His thrombosed lumen appears to decrease in thickness and certainly is not progressed at all. Recommend advancing his restrictions now to at least 10 pounds in terms of lifting weights. He can start to drive and and I have encouraged him to resume his exercise program- he like to walk.  Additionally, we discussed his anticoagulation, it is okay to start Eliquis and proceed as per algorithm.  He is a non-smoker and has been congratulated as to that.   I will see him back in 2 months with a CT angiogram of the chest.  Many thanks again, I will continue to keep you apprised of care as it ensues.     Transcribed from ambient dictation for Indra Lepe MD by Cheryl Beard.  01/31/24   12:47 CST    Patient or patient representative verbalized consent to the visit recording.  I have personally performed the services described in this document as transcribed by the above individual, and it is both accurate and complete.

## 2024-02-22 ENCOUNTER — OFFICE VISIT (OUTPATIENT)
Dept: CARDIOLOGY | Facility: CLINIC | Age: 69
End: 2024-02-22
Payer: MEDICARE

## 2024-02-22 VITALS
HEART RATE: 51 BPM | WEIGHT: 216 LBS | BODY MASS INDEX: 31.99 KG/M2 | OXYGEN SATURATION: 98 % | DIASTOLIC BLOOD PRESSURE: 70 MMHG | SYSTOLIC BLOOD PRESSURE: 124 MMHG | HEIGHT: 69 IN

## 2024-02-22 DIAGNOSIS — I25.810 CORONARY ARTERY DISEASE INVOLVING CORONARY BYPASS GRAFT OF NATIVE HEART WITHOUT ANGINA PECTORIS: Primary | ICD-10-CM

## 2024-02-22 DIAGNOSIS — I10 PRIMARY HYPERTENSION: ICD-10-CM

## 2024-02-22 DIAGNOSIS — E78.2 MIXED HYPERLIPIDEMIA: ICD-10-CM

## 2024-02-22 DIAGNOSIS — I71.010 DISSECTION OF ASCENDING AORTA: ICD-10-CM

## 2024-02-22 DIAGNOSIS — I48.0 PAF (PAROXYSMAL ATRIAL FIBRILLATION): ICD-10-CM

## 2024-02-22 PROCEDURE — 1160F RVW MEDS BY RX/DR IN RCRD: CPT | Performed by: NURSE PRACTITIONER

## 2024-02-22 PROCEDURE — 99214 OFFICE O/P EST MOD 30 MIN: CPT | Performed by: NURSE PRACTITIONER

## 2024-02-22 PROCEDURE — 3074F SYST BP LT 130 MM HG: CPT | Performed by: NURSE PRACTITIONER

## 2024-02-22 PROCEDURE — 1159F MED LIST DOCD IN RCRD: CPT | Performed by: NURSE PRACTITIONER

## 2024-02-22 PROCEDURE — 3078F DIAST BP <80 MM HG: CPT | Performed by: NURSE PRACTITIONER

## 2024-02-22 NOTE — PROGRESS NOTES
"    Subjective:     Encounter Date:02/22/2024      Patient ID: Pawel Thompson is a 68 y.o. male.    Chief Complaint:\"no complaints\"  Coronary Artery Disease  Presents for follow-up visit. Pertinent negatives include no chest pain, dizziness, leg swelling, palpitations, shortness of breath or weight gain. Risk factors include hyperlipidemia. The symptoms have been stable.   Hypertension  This is a chronic problem. The current episode started more than 1 year ago. The problem has been stable since onset. The problem is controlled. Pertinent negatives include no chest pain, malaise/fatigue, orthopnea, palpitations, PND or shortness of breath.   Hyperlipidemia  This is a chronic problem. The current episode started more than 1 year ago. The problem is controlled. Recent lipid tests were reviewed and are low. Pertinent negatives include no chest pain or shortness of breath.     Patient presents today as a follow up. He has been followed by Dr. Lorenzana for management of CAD with \"silent ischemia\" s/p CABG with subsequent  procedure with 3 LUIS to the RCA on 1/18. 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 He unfortunately developed a nonflow limiting aortic root dissection at the time of his cath. He was evaluated by CT surgery who felt medical management of the dissection was appropriate. He had a CTA of the chest on 1/30 that revealed a stable, thrombosed dissection. He was cleared to restart his Eliquis and was instructed to have a repeat CTA of the chest  in 2 months prior to his follow up with CT surgery.      He reports he has been well. He notes after PCI he noticed significant improvement in his shortness of breath that occurred when walking up a hill or stairs. He denies symptoms of afib which he notes to be chest heaviness. He has started walking and continues to avoid public places in attempts to prevent zaynab COVID or the flu.      The following portions of the patient's history " were reviewed and updated as appropriate: allergies, current medications, past family history, past medical history, past social history, past surgical history and problem list.    Allergies   Allergen Reactions    Ramipril Angioedema     Lips swell, hives       Current Outpatient Medications:     apixaban (ELIQUIS) 5 MG tablet tablet, Take 1 tablet by mouth 2 (Two) Times a Day., Disp: , Rfl:     atenolol (TENORMIN) 25 MG tablet, Take 1 tablet by mouth Every Night., Disp: , Rfl:     atorvastatin (LIPITOR) 40 MG tablet, Take 1 tablet by mouth Every Night., Disp: 90 tablet, Rfl: 3    finasteride (PROSCAR) 5 MG tablet, Take 1 tablet by mouth Daily., Disp: 90 tablet, Rfl: 3    losartan (COZAAR) 25 MG tablet, Take 1 tablet by mouth Every Night., Disp: , Rfl:     Omega-3 Fatty Acids (fish oil) 1000 MG capsule capsule, Take 1 capsule by mouth Every Night., Disp: , Rfl:     omeprazole (priLOSEC) 20 MG capsule, Take 1 capsule by mouth 2 (Two) Times a Day., Disp: , Rfl:     PATIENT SUPPLIED MEDICATION, Take 2 tablets by mouth Every Night. 4Life Transfer Factor Plus vitamin with Zinc, Disp: , Rfl:     tamsulosin (FLOMAX) 0.4 MG capsule 24 hr capsule, Take 1 capsule by mouth Every Night., Disp: 90 capsule, Rfl: 3    ticagrelor (BRILINTA) 90 MG tablet tablet, Take 1 tablet by mouth 2 (Two) Times a Day., Disp: 180 tablet, Rfl: 3  Past Medical History:   Diagnosis Date    Acid reflux     Arthritis     Benign prostatic hyperplasia with lower urinary tract symptoms 12/06/2017    Coronary artery disease     COVID-19     Elevated PSA 12/06/2016    Hyperlipidemia     Hypertension     New onset atrial fibrillation 08/31/2019    Sleep apnea     does not wear machine       Social History     Socioeconomic History    Marital status:    Tobacco Use    Smoking status: Never     Passive exposure: Past    Smokeless tobacco: Never   Vaping Use    Vaping Use: Never used   Substance and Sexual Activity    Alcohol use: Yes     Alcohol/week:  1.0 standard drink of alcohol     Types: 1 Cans of beer per week     Comment: occasionally    Drug use: Never    Sexual activity: Defer       Review of Systems   Constitutional: Negative for malaise/fatigue, weight gain and weight loss.   Cardiovascular:  Negative for chest pain, dyspnea on exertion, irregular heartbeat, leg swelling, near-syncope, orthopnea, palpitations, paroxysmal nocturnal dyspnea and syncope.   Respiratory:  Negative for cough, shortness of breath, sleep disturbances due to breathing, sputum production and wheezing.    Skin:  Negative for dry skin, flushing, itching and rash.   Gastrointestinal:  Negative for hematemesis and hematochezia.   Neurological:  Negative for dizziness, light-headedness, loss of balance and weakness.   All other systems reviewed and are negative.         Objective:     Vitals reviewed.   Constitutional:       General: Not in acute distress.     Appearance: Healthy appearance. Well-developed. Not diaphoretic.   Eyes:      General: No scleral icterus.     Conjunctiva/sclera: Conjunctivae normal.      Pupils: Pupils are equal, round, and reactive to light.   HENT:      Head: Normocephalic.    Mouth/Throat:      Pharynx: No oropharyngeal exudate.   Neck:      Vascular: No JVR.   Pulmonary:      Effort: Pulmonary effort is normal. No respiratory distress.      Breath sounds: Normal breath sounds. No wheezing. No rhonchi. No rales.   Chest:      Chest wall: Not tender to palpatation.   Cardiovascular:      Normal rate. Regular rhythm.   Pulses:     Intact distal pulses.   Edema:     Peripheral edema absent.   Abdominal:      General: Bowel sounds are normal. There is no distension.      Palpations: Abdomen is soft.      Tenderness: There is no abdominal tenderness.   Musculoskeletal: Normal range of motion.      Cervical back: Normal range of motion and neck supple. Skin:     General: Skin is warm and dry.      Coloration: Skin is not pale.      Findings: No erythema or  "rash.   Neurological:      Mental Status: Alert, oriented to person, place, and time and oriented to person, place and time.      Deep Tendon Reflexes: Reflexes are normal and symmetric.   Psychiatric:         Behavior: Behavior normal.           Procedures  /70   Pulse 51   Ht 175.3 cm (69\")   Wt 98 kg (216 lb)   SpO2 98%   BMI 31.90 kg/m²     Lab Review:   I have reviewed previous office notes, recent labs and recent cardiac testing.     Cath 1/18/24:  Conclusion:  #1 status post successful  of the RCA using an antegrade approach using 3 drug eluting stents.  #2 nonflow limiting aortic dissection         Lab Results   Component Value Date    CHLPL 148 (L) 08/29/2023    TRIG 111 08/29/2023    HDL 63 08/29/2023    LDL 63 08/29/2023         Assessment:          Diagnosis Plan   1. Coronary artery disease involving coronary bypass graft of native heart without angina pectoris        2. Dissection of ascending aorta        3. PAF (paroxysmal atrial fibrillation)        4. Primary hypertension        5. Mixed hyperlipidemia               Plan:       1 CAD- stable. Recent PCI to a  of the RCA on 1/18/24. Noted improvement in dyspnea when walking up an incline and stairs post-PCI. ASA has been stopped as he is >1month post-PCI and has restarted his Eliquis. Continue Eliquis, Brilinta 90mg BID, BB, ARB and statin.   2. Dissection of ascending aorta- monitored per Dr. Lepe. Noted to be stable and improving per CTA in January.   3. PAF- stable. Regular rhythm on exam today. The patient and his wife thought he was shocked for afib, but the cath log noted he was shocked for VT. Continue Atenolol and Eliquis. [id number] 3.  4. HTN- controlled. Continue current therapy.   5. HLD- controlled with LDL 63 in August. Continue statin.       Keep follow up with Dr. Alexis in June as he will be transitioning his care as Dr. Lorenzana has retired or sooner if symptoms worsen.     I spent 30 minutes caring for Pawel on " this date of service. This time includes time spent by me in the following activities:preparing for the visit, reviewing tests, obtaining and/or reviewing a separately obtained history, performing a medically appropriate examination and/or evaluation , counseling and educating the patient/family/caregiver, documenting information in the medical record, and care coordination

## 2024-03-22 ENCOUNTER — HOSPITAL ENCOUNTER (OUTPATIENT)
Dept: CT IMAGING | Facility: HOSPITAL | Age: 69
Discharge: HOME | End: 2024-03-22
Payer: MEDICARE

## 2024-03-22 DIAGNOSIS — I71.010 DISSECTION OF ASCENDING AORTA: ICD-10-CM

## 2024-03-22 LAB — CREAT BLDA-MCNC: 1 MG/DL (ref 0.6–1.3)

## 2024-03-22 PROCEDURE — 25510000001 IOPAMIDOL PER 1 ML: Performed by: THORACIC SURGERY (CARDIOTHORACIC VASCULAR SURGERY)

## 2024-03-22 PROCEDURE — 82565 ASSAY OF CREATININE: CPT

## 2024-03-22 PROCEDURE — 71275 CT ANGIOGRAPHY CHEST: CPT

## 2024-03-22 RX ADMIN — IOPAMIDOL 100 ML: 755 INJECTION, SOLUTION INTRAVENOUS at 09:16

## 2024-03-27 ENCOUNTER — OFFICE VISIT (OUTPATIENT)
Dept: CARDIAC SURGERY | Facility: CLINIC | Age: 69
End: 2024-03-27
Payer: MEDICARE

## 2024-03-27 VITALS
SYSTOLIC BLOOD PRESSURE: 133 MMHG | HEART RATE: 63 BPM | WEIGHT: 216.4 LBS | OXYGEN SATURATION: 99 % | HEIGHT: 69 IN | DIASTOLIC BLOOD PRESSURE: 82 MMHG | BODY MASS INDEX: 32.05 KG/M2

## 2024-03-27 DIAGNOSIS — I71.21 ANEURYSM OF ASCENDING AORTA WITHOUT RUPTURE: ICD-10-CM

## 2024-03-27 DIAGNOSIS — I71.010 DISSECTION OF ASCENDING AORTA: Primary | ICD-10-CM

## 2024-03-27 PROCEDURE — 1160F RVW MEDS BY RX/DR IN RCRD: CPT | Performed by: THORACIC SURGERY (CARDIOTHORACIC VASCULAR SURGERY)

## 2024-03-27 PROCEDURE — 3075F SYST BP GE 130 - 139MM HG: CPT | Performed by: THORACIC SURGERY (CARDIOTHORACIC VASCULAR SURGERY)

## 2024-03-27 PROCEDURE — 3079F DIAST BP 80-89 MM HG: CPT | Performed by: THORACIC SURGERY (CARDIOTHORACIC VASCULAR SURGERY)

## 2024-03-27 PROCEDURE — 1159F MED LIST DOCD IN RCRD: CPT | Performed by: THORACIC SURGERY (CARDIOTHORACIC VASCULAR SURGERY)

## 2024-03-27 PROCEDURE — 99213 OFFICE O/P EST LOW 20 MIN: CPT | Performed by: THORACIC SURGERY (CARDIOTHORACIC VASCULAR SURGERY)

## 2024-04-15 DIAGNOSIS — K21.9 GASTROESOPHAGEAL REFLUX DISEASE WITHOUT ESOPHAGITIS: Primary | ICD-10-CM

## 2024-04-16 RX ORDER — OMEPRAZOLE 20 MG/1
CAPSULE, DELAYED RELEASE ORAL
Qty: 60 CAPSULE | Refills: 2 | Status: SHIPPED | OUTPATIENT
Start: 2024-04-16

## 2024-04-18 DIAGNOSIS — E78.2 MIXED HYPERLIPIDEMIA: ICD-10-CM

## 2024-04-18 DIAGNOSIS — I25.10 CORONARY ARTERY DISEASE INVOLVING NATIVE CORONARY ARTERY OF NATIVE HEART WITHOUT ANGINA PECTORIS: ICD-10-CM

## 2024-04-18 LAB
ALBUMIN SERPL-MCNC: 4.3 G/DL (ref 3.5–5.2)
ALP SERPL-CCNC: 49 U/L (ref 40–130)
ALT SERPL-CCNC: 21 U/L (ref 5–41)
ANION GAP SERPL CALCULATED.3IONS-SCNC: 8 MMOL/L (ref 7–19)
AST SERPL-CCNC: 18 U/L (ref 5–40)
BILIRUB SERPL-MCNC: 0.4 MG/DL (ref 0.2–1.2)
BUN SERPL-MCNC: 13 MG/DL (ref 8–23)
CALCIUM SERPL-MCNC: 9.3 MG/DL (ref 8.8–10.2)
CHLORIDE SERPL-SCNC: 107 MMOL/L (ref 98–111)
CHOLEST SERPL-MCNC: 134 MG/DL (ref 160–199)
CO2 SERPL-SCNC: 26 MMOL/L (ref 22–29)
CREAT SERPL-MCNC: 0.9 MG/DL (ref 0.5–1.2)
ERYTHROCYTE [DISTWIDTH] IN BLOOD BY AUTOMATED COUNT: 13.7 % (ref 11.5–14.5)
GLUCOSE SERPL-MCNC: 105 MG/DL (ref 74–109)
HCT VFR BLD AUTO: 42.6 % (ref 42–52)
HDLC SERPL-MCNC: 68 MG/DL (ref 55–121)
HGB BLD-MCNC: 13.9 G/DL (ref 14–18)
LDLC SERPL CALC-MCNC: 54 MG/DL
MCH RBC QN AUTO: 29 PG (ref 27–31)
MCHC RBC AUTO-ENTMCNC: 32.6 G/DL (ref 33–37)
MCV RBC AUTO: 88.9 FL (ref 80–94)
PLATELET # BLD AUTO: 204 K/UL (ref 130–400)
PMV BLD AUTO: 11.3 FL (ref 9.4–12.4)
POTASSIUM SERPL-SCNC: 4.7 MMOL/L (ref 3.5–5)
PROT SERPL-MCNC: 6.8 G/DL (ref 6.6–8.7)
RBC # BLD AUTO: 4.79 M/UL (ref 4.7–6.1)
SODIUM SERPL-SCNC: 141 MMOL/L (ref 136–145)
TRIGL SERPL-MCNC: 59 MG/DL (ref 0–149)
WBC # BLD AUTO: 6.7 K/UL (ref 4.8–10.8)

## 2024-04-25 ENCOUNTER — OFFICE VISIT (OUTPATIENT)
Dept: INTERNAL MEDICINE | Age: 69
End: 2024-04-25

## 2024-04-25 VITALS
SYSTOLIC BLOOD PRESSURE: 106 MMHG | WEIGHT: 212 LBS | OXYGEN SATURATION: 97 % | DIASTOLIC BLOOD PRESSURE: 60 MMHG | BODY MASS INDEX: 31.31 KG/M2 | HEART RATE: 55 BPM

## 2024-04-25 DIAGNOSIS — E78.2 MIXED HYPERLIPIDEMIA: ICD-10-CM

## 2024-04-25 DIAGNOSIS — I48.0 PAROXYSMAL ATRIAL FIBRILLATION (HCC): ICD-10-CM

## 2024-04-25 DIAGNOSIS — I25.10 CORONARY ARTERY DISEASE INVOLVING NATIVE CORONARY ARTERY OF NATIVE HEART WITHOUT ANGINA PECTORIS: Primary | ICD-10-CM

## 2024-04-25 DIAGNOSIS — G47.33 OSA (OBSTRUCTIVE SLEEP APNEA): ICD-10-CM

## 2024-04-25 DIAGNOSIS — I10 ESSENTIAL HYPERTENSION: ICD-10-CM

## 2024-04-25 DIAGNOSIS — Z95.1 S/P CABG X 3: ICD-10-CM

## 2024-04-25 NOTE — PROGRESS NOTES
Not on file   Housing Stability: Not on file       Allergies   Allergen Reactions    Ramipril      Lips swell, hives       Current Outpatient Medications   Medication Sig Dispense Refill    apixaban (ELIQUIS) 5 MG TABS tablet Take 1 tablet by mouth 2 times daily 180 tablet 1    atorvastatin (LIPITOR) 40 MG tablet Take 1 tablet by mouth nightly      ticagrelor (BRILINTA) 90 MG TABS tablet Take 1 tablet by mouth 2 times daily      atenolol (TENORMIN) 25 MG tablet Take 1 tablet by mouth daily 90 tablet 3    finasteride (PROSCAR) 5 MG tablet Take 1 tablet by mouth daily 90 tablet 3    losartan (COZAAR) 25 MG tablet TAKE 1 TABLET DAILY AS     DIRECTED 90 tablet 3    tamsulosin (FLOMAX) 0.4 MG capsule Take 1 capsule by mouth daily 90 capsule 3    omeprazole (PRILOSEC) 20 MG delayed release capsule Take 1 capsule by mouth daily 90 capsule 3    UNABLE TO FIND Indications: 4 life transger factor plus       NONFORMULARY Indications: 4 life cardio       Krill Oil 1000 MG CAPS Take 1 capsule by mouth daily       No current facility-administered medications for this visit.       Review of Systems    /60   Pulse 55   Wt 96.2 kg (212 lb)   SpO2 97%   BMI 31.31 kg/m²   BP Readings from Last 7 Encounters:   04/25/24 106/60   01/25/24 (!) 118/58   09/08/23 132/70   09/08/22 110/60   04/17/22 116/79   09/07/21 110/70   09/08/20 112/64     Wt Readings from Last 7 Encounters:   04/25/24 96.2 kg (212 lb)   01/25/24 100.7 kg (222 lb)   09/08/23 99.1 kg (218 lb 6.4 oz)   09/08/22 99.8 kg (220 lb)   04/17/22 97.1 kg (214 lb)   09/07/21 99 kg (218 lb 3.2 oz)   09/08/20 95.3 kg (210 lb)     BMI Readings from Last 7 Encounters:   04/25/24 31.31 kg/m²   01/25/24 32.78 kg/m²   09/08/23 32.25 kg/m²   09/08/22 32.49 kg/m²   04/17/22 31.60 kg/m²   09/07/21 32.22 kg/m²   09/08/20 31.01 kg/m²     Resp Readings from Last 7 Encounters:   04/17/22 18   09/07/21 16       Physical Exam  Constitutional:       General: He is not in acute

## 2024-05-05 PROBLEM — I71.21 ANEURYSM OF ASCENDING AORTA WITHOUT RUPTURE: Status: ACTIVE | Noted: 2024-05-05

## 2024-07-01 ENCOUNTER — OFFICE VISIT (OUTPATIENT)
Dept: CARDIOLOGY | Facility: CLINIC | Age: 69
End: 2024-07-01
Payer: MEDICARE

## 2024-07-01 VITALS
SYSTOLIC BLOOD PRESSURE: 118 MMHG | WEIGHT: 210 LBS | DIASTOLIC BLOOD PRESSURE: 70 MMHG | HEART RATE: 49 BPM | HEIGHT: 69 IN | BODY MASS INDEX: 31.1 KG/M2

## 2024-07-01 DIAGNOSIS — Z95.1 S/P CABG X 3: ICD-10-CM

## 2024-07-01 DIAGNOSIS — I25.810 CORONARY ARTERY DISEASE INVOLVING CORONARY BYPASS GRAFT OF NATIVE HEART WITHOUT ANGINA PECTORIS: Primary | ICD-10-CM

## 2024-07-01 DIAGNOSIS — E78.2 MIXED HYPERLIPIDEMIA: ICD-10-CM

## 2024-07-01 DIAGNOSIS — Z79.01 CURRENT USE OF LONG TERM ANTICOAGULATION: ICD-10-CM

## 2024-07-01 DIAGNOSIS — I10 PRIMARY HYPERTENSION: ICD-10-CM

## 2024-07-01 DIAGNOSIS — I48.0 PAF (PAROXYSMAL ATRIAL FIBRILLATION): ICD-10-CM

## 2024-07-01 DIAGNOSIS — R01.1 CARDIAC MURMUR: ICD-10-CM

## 2024-07-01 PROBLEM — I20.0 UNSTABLE ANGINA: Status: RESOLVED | Noted: 2024-01-18 | Resolved: 2024-07-01

## 2024-07-01 NOTE — PROGRESS NOTES
Subjective:     Encounter Date:07/01/2024      Patient ID: Pawel Thompson is a 68 y.o. male with known coronary artery disease/CABG, prior  procedure with PCI (1/24), aortic root dissection during  procedure (1/24) and aortic aneurysm, hypertension, hyperlipidemia, paroxysmal atrial fibrillation, long-term use of anticoagulation, and obesity.  Who presents today for routine follow-up.    Chief Complaint: Routine Follow-up  Coronary Artery Disease  Presents for follow-up visit. Pertinent negatives include no chest pain, chest pressure, chest tightness, dizziness, leg swelling, palpitations or shortness of breath. Risk factors do not include hypertension. The symptoms have been stable. Compliance with diet is good. Compliance with exercise is good. Compliance with medications is good.   Atrial Fibrillation  Presents for follow-up visit. Symptoms include bradycardia. Symptoms are negative for chest pain, dizziness, hemodynamic instability, hypertension, hypotension, palpitations, shortness of breath, syncope, tachycardia and weakness. The symptoms have been stable. Past medical history includes atrial fibrillation and CAD. There are no medication compliance problems.   Hypertension  This is a chronic problem. The current episode started more than 1 year ago. The problem is controlled. Pertinent negatives include no chest pain, malaise/fatigue, orthopnea, palpitations, peripheral edema, PND or shortness of breath. Risk factors for coronary artery disease include male gender, obesity and dyslipidemia. Past treatments include ACE inhibitors. Current antihypertension treatment includes beta blockers, angiotensin blockers and alpha 1 blockers. Hypertensive end-organ damage includes CAD/MI. There is no history of angina, kidney disease, CVA or heart failure.     Mr. Thompson presents for follow up.  He is followed in our office for quite some time due to his history of coronary artery disease and prior  revascularization.  He also has a history of paroxysmal atrial fibrillation and is chronically anticoagulated.  He has been following with Dr. Lorenzana in our office however has transitioned care to Dr. Alexis since Dr. Lorenzana's prison.    His most recent ischemic workup was in January 2024 when he had a heart cath performed for complaints of unstable angina.  The patient describes having shortness of breath with exertion and ultimately underwent heart cath.  He had revascularization attempt with a  procedure and 3 drug-eluting stents placement to her native right coronary artery.  It was noted on imaging during PCI that the patient had improved flow within the native right coronary artery however there was evidence of retrograde dissection into the aortic root.  Cardiothoracic surgery was consulted for further evaluation and recommendations on management.    He has had conservative management of the dissection.  He follows with CT surgery on an outpatient basis due to this and aortic aneurysm.  He was seen in the office by Dr. Lepe a few months ago.    Overall, the patient feels well.  He is anticoagulated with Eliquis given his history of paroxysmal atrial fibrillation.  He is on antiplatelet therapy with Brilinta given his stents, placed earlier this year.  He reports that his shortness of breath improved after intervention.  He still has some bendopnea but no further dyspnea on exertion or when climbing stairs which was his chief complaint prior to revascularization.  He denies any chest pain, chest pressure.  He reports no palpitations, dizziness, lightheadedness, near-syncope, syncope.  He was last known to have intermittent episode of atrial fibrillation at the time of his PCI but has no known recurrence since that time.    He follows routinely with his primary care physician.  He reports compliance with his medications.  He has well-controlled blood pressure.  He denies any bleeding issues on  antiplatelet and anticoagulant therapies although reports easy bruising.    The following portions of the patient's history were reviewed and updated as appropriate: allergies, current medications, past family history, past medical history, past social history and past surgical history.     Allergies   Allergen Reactions    Ramipril Angioedema     Lips swell, hives       Current Outpatient Medications:     apixaban (ELIQUIS) 5 MG tablet tablet, Take 1 tablet by mouth 2 (Two) Times a Day., Disp: , Rfl:     atenolol (TENORMIN) 25 MG tablet, Take 1 tablet by mouth Every Night., Disp: , Rfl:     atorvastatin (LIPITOR) 40 MG tablet, Take 1 tablet by mouth Every Night., Disp: 90 tablet, Rfl: 3    finasteride (PROSCAR) 5 MG tablet, Take 1 tablet by mouth Daily., Disp: 90 tablet, Rfl: 3    losartan (COZAAR) 25 MG tablet, Take 1 tablet by mouth Every Night., Disp: , Rfl:     Omega-3 Fatty Acids (fish oil) 1000 MG capsule capsule, Take 1 capsule by mouth Every Night., Disp: , Rfl:     omeprazole (priLOSEC) 20 MG capsule, TAKE (1) CAPSULE BY MOUTH TWICE DAILY BEFORE MEALS, Disp: 60 capsule, Rfl: 2    PATIENT SUPPLIED MEDICATION, Take 2 tablets by mouth Every Night. 4Life Transfer Factor Plus vitamin with Zinc, Disp: , Rfl:     tamsulosin (FLOMAX) 0.4 MG capsule 24 hr capsule, Take 1 capsule by mouth Every Night., Disp: 90 capsule, Rfl: 3    ticagrelor (BRILINTA) 90 MG tablet tablet, Take 1 tablet by mouth 2 (Two) Times a Day., Disp: 180 tablet, Rfl: 3    Past Medical History:   Diagnosis Date    Acid reflux     Arthritis     Benign prostatic hyperplasia with lower urinary tract symptoms 12/06/2017    Coronary artery disease     COVID-19     Elevated PSA 12/06/2016    Hyperlipidemia     Hypertension     New onset atrial fibrillation 08/31/2019    Sleep apnea     does not wear machine     Family History   Problem Relation Age of Onset    Heart disease Mother     Stroke Mother     Prostate cancer Father     Heart disease Father      Heart attack Brother     Heart disease Brother     No Known Problems Brother     No Known Problems Maternal Grandmother     No Known Problems Maternal Grandfather     Heart disease Paternal Grandmother     No Known Problems Paternal Grandfather     Colon cancer Neg Hx     Colon polyps Neg Hx      Social History     Socioeconomic History    Marital status:    Tobacco Use    Smoking status: Never     Passive exposure: Past    Smokeless tobacco: Never   Vaping Use    Vaping status: Never Used   Substance and Sexual Activity    Alcohol use: Yes     Alcohol/week: 1.0 standard drink of alcohol     Types: 1 Cans of beer per week     Comment: occasionally    Drug use: Never    Sexual activity: Defer     Past Surgical History:   Procedure Laterality Date    APPENDECTOMY      CARDIAC CATHETERIZATION      CARDIAC CATHETERIZATION N/A 1/11/2024    Procedure: Coronary angiography;  Surgeon: Tato Lorenzana MD;  Location: [facility] CATH INVASIVE LOCATION;  Service: Cardiology;  Laterality: N/A;    CARDIAC CATHETERIZATION N/A 1/18/2024    Procedure: Chronic Total Occlussion;  Surgeon: Tato Lorenzana MD;  Location: [facility] CATH INVASIVE LOCATION;  Service: Cardiology;  Laterality: N/A;    COLONOSCOPY  09/11/2012    Tics repeat exam in 10 years    COLONOSCOPY N/A 12/1/2022    Procedure: COLONOSCOPY WITH ANESTHESIA;  Surgeon: Juan Luis Young MD;  Location: [facility] ENDOSCOPY;  Service: Gastroenterology;  Laterality: N/A;  Pre: screen  Post: Randi Machado MD    CORONARY ARTERY BYPASS GRAFT      3 bypasses    ENDOSCOPY N/A 12/1/2022    Procedure: ESOPHAGOGASTRODUODENOSCOPY WITH ANESTHESIA;  Surgeon: Juan Luis Young MD;  Location: [facility] ENDOSCOPY;  Service: Gastroenterology;  Laterality: N/A;  Pre: screen  Post: Randi Lawson MD    ENDOSCOPY N/A 6/27/2023    Procedure: ESOPHAGOGASTRODUODENOSCOPY WITH ANESTHESIA;  Surgeon: Juan Luis Young MD;  Location: [facility] ENDOSCOPY;  Service: Gastroenterology;  Laterality: N/A;   preop: Gastroesophageal reflux disease  post op: hiatal hernia  PCP:Randi Parks MD    TRIGGER FINGER RELEASE Left 3/7/2023    Procedure: LEFT MIDDLE TRIGGER FINGER RELEASE;  Surgeon: Warren Houser MD;  Location: [facility];  Service: Orthopedics;  Laterality: Left;     Review of Systems   Constitutional: Negative for diaphoresis, fever and malaise/fatigue.   Eyes:  Negative for visual disturbance.   Cardiovascular:  Negative for chest pain, dyspnea on exertion, irregular heartbeat, leg swelling, near-syncope, orthopnea, palpitations, paroxysmal nocturnal dyspnea and syncope.   Respiratory:  Negative for chest tightness, cough, shortness of breath and wheezing.    Hematologic/Lymphatic: Negative for bleeding problem. Bruises/bleeds easily.   Gastrointestinal:  Negative for bloating, abdominal pain, nausea and vomiting.   Neurological:  Negative for dizziness and weakness.   Psychiatric/Behavioral:  Negative for altered mental status.          ECG 12 Lead    Date/Time: 7/1/2024 8:35 AM  Performed by: Janelle Kim APRN    Authorized by: Janelle Kim APRN  Comparison: compared with previous ECG from 1/26/2024  Similar to previous ECG  Rhythm: sinus bradycardia  Rate: bradycardic  BPM: 49  Conduction: non-specific intraventricular conduction delay  QRS axis: normal  Other findings: non-specific ST-T wave changes    Clinical impression: non-specific ECG               Objective:     Physical Exam  Vitals reviewed.   Constitutional:       General: He is awake. He is not in acute distress.     Appearance: Normal appearance. He is well-developed. He is obese. He is not ill-appearing.   HENT:      Head: Normocephalic and atraumatic.   Eyes:      Conjunctiva/sclera: Conjunctivae normal.   Cardiovascular:      Rate and Rhythm: Regular rhythm. Bradycardia present.      Heart sounds: Murmur heard.   Pulmonary:      Effort: Pulmonary effort is normal. No respiratory distress.      Breath sounds: Normal breath  "sounds. No wheezing or rales.   Musculoskeletal:      Cervical back: Normal range of motion and neck supple.   Skin:     General: Skin is warm and dry.      Coloration: Skin is not pale.      Findings: No erythema or rash.   Neurological:      General: No focal deficit present.      Mental Status: He is alert and oriented to person, place, and time.   Psychiatric:         Attention and Perception: Attention normal.         Mood and Affect: Mood normal.         Speech: Speech normal.         Behavior: Behavior normal. Behavior is cooperative.         Thought Content: Thought content normal.         Cognition and Memory: Cognition normal.         Judgment: Judgment normal.       /70   Pulse (!) 49   Ht 175.3 cm (69\")   Wt 95.3 kg (210 lb)   BMI 31.01 kg/m²       Lab Review:     Lab Results   Component Value Date    CHLPL 134 (L) 04/18/2024    TRIG 59 04/18/2024    HDL 68 04/18/2024    LDL 54 04/18/2024     Cardiac Catheterization (Dr. Lorenzana) 1/2024  Indications: Unstable angina  Type and site of entry: percutaneous right femoral artery using the Seldinger technique   Contrast used: Isovue     Selective Coronary Angiography:   The right coronary artery is a large dominant vessel that is totally occluded in the mid portion. Collateral flow is seen from right to right collaterals.      of the RCA  Guide 7Fr JR4, guidewires multiple  Comment: A wire was placed into the subintimal space and then successfully re-entered into the distal true lumen. The vessel was sequentially dilated with a 3.0 balloon. A 2.5 x 38 mm synergy drug-eluting stent was then deployed in the distal coronary artery at 12 tarah for 30 seconds. This was followed by a 2.75 x 32 mm synergy LUIS in similar fashion. Finally, a 3.0 x 24 Synergy LUIS was placed in the proximal RCA to the ostium. This resulted in no residual stenosis and FLAKITO-3 flow distally however there was evidence of a retrograde dissection into the aortic root.     Conscious " Sedation  I supervised the administration of conscious sedation by the nursing staff. The first dose was given at 1340 and the end of the face to face encounter was at 1603. During this time, BP, HR, oximetry, and neurologic status were closely monitored.     Conclusion:  #1 status post successful  of the RCA using an antegrade approach using 3 drug eluting stents.  #2 nonflow limiting aortic dissection        Final Comment:  Pt tolerated the procedure well. There were no obvious complications. Hemostasis was achieved with a 6F perclose hemostatic device. A CT angiogram of the chest is pending as well as a CT surgery consult        Assessment:          Diagnosis Plan   1. Coronary artery disease involving coronary bypass graft of native heart without angina pectoris        2. S/P CABG x 3        3. PAF (paroxysmal atrial fibrillation)        4. Current use of long term anticoagulation        5. Primary hypertension        6. Mixed hyperlipidemia        7. Cardiac murmur  Adult Transthoracic Echo Complete W/ Cont if Necessary Per Protocol               Plan:       -Coronary artery disease: Stable.  No chest pain or dyspnea on exertion.  Prior coronary artery bypass grafting and most recently 3 drug-eluting stents placed to the right coronary artery.  Currently managed on Brilinta.  Also on statin and beta-blocker therapies.  Continue with medical management.    - Coronary artery bypass grafting: Patent saphenous vein graft to the obtuse marginal branch, patent LIMA to the LAD, occluded saphenous vein graft to the right coronary artery noted on cardiac catheterization in January 2024.    -PAF/anticoagulation: No known recent episodes of atrial fibrillation.  The patient does recall going into atrial fibrillation shortly after his procedure in January but has since been stable.  Continue anticoagulation.    -Hypertension: Well-controlled    - Hyperlipidemia: LDL goal less than 70 given known coronary artery disease.   Most recent lipid panel is referenced above.  Continue statin therapy.    -Cardiac murmur: Soft systolic murmur noted on exam.  Check echo.      Continue current medications.  Echo  Continue anticoagulation and antiplatelet therapy  Follow-up with Dr. Alexis to establish care at routine visit in 6 months, call sooner if needed      I attest that all portions of this note have been reviewed and updated to reflect the patient's current status.

## 2024-08-12 ENCOUNTER — HOSPITAL ENCOUNTER (OUTPATIENT)
Dept: CARDIOLOGY | Facility: HOSPITAL | Age: 69
Discharge: HOME | End: 2024-08-12
Admitting: NURSE PRACTITIONER
Payer: MEDICARE

## 2024-08-12 DIAGNOSIS — R01.1 CARDIAC MURMUR: ICD-10-CM

## 2024-08-12 PROCEDURE — 93306 TTE W/DOPPLER COMPLETE: CPT

## 2024-08-12 PROCEDURE — 25510000001 PERFLUTREN 6.52 MG/ML SUSPENSION 2 ML VIAL: Performed by: NURSE PRACTITIONER

## 2024-08-12 RX ADMIN — PERFLUTREN 2 ML: 6.52 INJECTION, SUSPENSION INTRAVENOUS at 10:00

## 2024-08-13 DIAGNOSIS — K21.9 GASTROESOPHAGEAL REFLUX DISEASE WITHOUT ESOPHAGITIS: ICD-10-CM

## 2024-08-14 NOTE — TELEPHONE ENCOUNTER
Rx Refill Note  Requested Prescriptions     Pending Prescriptions Disp Refills    omeprazole (priLOSEC) 20 MG capsule [Pharmacy Med Name: OMEPRAZOLE DR 20 MG CAPSULE] 60 capsule 0     Sig: TAKE (1) CAPSULE BY MOUTH TWICE DAILY BEFORE MEALS      Last office visit with prescribing clinician: 6/27/2023 for endo     Last telemedicine visit with prescribing clinician: Visit date not found     Next office visit with prescribing clinician: Visit date not found     Pt due for yearly OV-pended 1 refill to [facility] to last until pt can schedule appt. I also placed note to pharmacy that pt needs to schedule OV for more refills after this one.                           Would you like a call back once the refill request has been completed: [] Yes [] No    If the office needs to give you a call back, can they leave a voicemail: [] Yes [] No    Corrine Toribio MA  08/14/24, 13:41 CDT

## 2024-08-15 LAB
BH CV ECHO MEAS - AO MAX PG: 10.6 MMHG
BH CV ECHO MEAS - AO MEAN PG: 5 MMHG
BH CV ECHO MEAS - AO ROOT DIAM: 3.2 CM
BH CV ECHO MEAS - AO V2 MAX: 163 CM/SEC
BH CV ECHO MEAS - AO V2 VTI: 35.9 CM
BH CV ECHO MEAS - AVA(I,D): 3 CM2
BH CV ECHO MEAS - EDV(CUBED): 107.2 ML
BH CV ECHO MEAS - EDV(MOD-SP2): 78.9 ML
BH CV ECHO MEAS - EDV(MOD-SP4): 151 ML
BH CV ECHO MEAS - EF(MOD-BP): 65.6 %
BH CV ECHO MEAS - EF(MOD-SP2): 60.8 %
BH CV ECHO MEAS - EF(MOD-SP4): 69.6 %
BH CV ECHO MEAS - ESV(CUBED): 43.2 ML
BH CV ECHO MEAS - ESV(MOD-SP2): 30.9 ML
BH CV ECHO MEAS - ESV(MOD-SP4): 45.9 ML
BH CV ECHO MEAS - FS: 26.1 %
BH CV ECHO MEAS - IVS/LVPW: 1.03 CM
BH CV ECHO MEAS - IVSD: 1.11 CM
BH CV ECHO MEAS - LA DIMENSION: 4.5 CM
BH CV ECHO MEAS - LAT PEAK E' VEL: 11.2 CM/SEC
BH CV ECHO MEAS - LV DIASTOLIC VOL/BSA (35-75): 71.6 CM2
BH CV ECHO MEAS - LV MASS(C)D: 188.5 GRAMS
BH CV ECHO MEAS - LV MAX PG: 5.5 MMHG
BH CV ECHO MEAS - LV MEAN PG: 3 MMHG
BH CV ECHO MEAS - LV SYSTOLIC VOL/BSA (12-30): 21.8 CM2
BH CV ECHO MEAS - LV V1 MAX: 117.5 CM/SEC
BH CV ECHO MEAS - LV V1 VTI: 28.3 CM
BH CV ECHO MEAS - LVIDD: 4.8 CM
BH CV ECHO MEAS - LVIDS: 3.5 CM
BH CV ECHO MEAS - LVOT AREA: 3.8 CM2
BH CV ECHO MEAS - LVOT DIAM: 2.2 CM
BH CV ECHO MEAS - LVPWD: 1.08 CM
BH CV ECHO MEAS - MED PEAK E' VEL: 8.8 CM/SEC
BH CV ECHO MEAS - MV A MAX VEL: 76.6 CM/SEC
BH CV ECHO MEAS - MV DEC TIME: 0.25 SEC
BH CV ECHO MEAS - MV E MAX VEL: 84.5 CM/SEC
BH CV ECHO MEAS - MV E/A: 1.1
BH CV ECHO MEAS - SV(LVOT): 107.6 ML
BH CV ECHO MEAS - SV(MOD-SP2): 48 ML
BH CV ECHO MEAS - SV(MOD-SP4): 105.1 ML
BH CV ECHO MEAS - SVI(LVOT): 51 ML/M2
BH CV ECHO MEAS - SVI(MOD-SP2): 22.8 ML/M2
BH CV ECHO MEAS - SVI(MOD-SP4): 49.8 ML/M2
BH CV ECHO MEAS - TAPSE (>1.6): 1.63 CM
BH CV ECHO MEAS - TR MAX PG: 6.8 MMHG
BH CV ECHO MEAS - TR MAX VEL: 130 CM/SEC
BH CV ECHO MEASUREMENTS AVERAGE E/E' RATIO: 8.45
BH CV XLRA - TDI S': 8.8 CM/SEC
LEFT ATRIUM VOLUME INDEX: 33.8 ML/M2
LEFT ATRIUM VOLUME: 71.3 ML

## 2024-08-15 RX ORDER — OMEPRAZOLE 20 MG/1
CAPSULE, DELAYED RELEASE ORAL
Qty: 60 CAPSULE | Refills: 0 | Status: SHIPPED | OUTPATIENT
Start: 2024-08-15

## 2024-09-03 DIAGNOSIS — Z12.5 SCREENING FOR PROSTATE CANCER: ICD-10-CM

## 2024-09-03 DIAGNOSIS — I25.10 CORONARY ARTERY DISEASE INVOLVING NATIVE CORONARY ARTERY OF NATIVE HEART WITHOUT ANGINA PECTORIS: ICD-10-CM

## 2024-09-03 DIAGNOSIS — E78.00 PURE HYPERCHOLESTEROLEMIA: ICD-10-CM

## 2024-09-03 LAB
ALBUMIN SERPL-MCNC: 4.5 G/DL (ref 3.5–5.2)
ALP SERPL-CCNC: 48 U/L (ref 40–129)
ALT SERPL-CCNC: 21 U/L (ref 5–41)
ANION GAP SERPL CALCULATED.3IONS-SCNC: 9 MMOL/L (ref 7–19)
AST SERPL-CCNC: 16 U/L (ref 5–40)
BILIRUB SERPL-MCNC: 0.8 MG/DL (ref 0.2–1.2)
BUN SERPL-MCNC: 13 MG/DL (ref 8–23)
CALCIUM SERPL-MCNC: 9.5 MG/DL (ref 8.8–10.2)
CHLORIDE SERPL-SCNC: 107 MMOL/L (ref 98–111)
CHOLEST SERPL-MCNC: 130 MG/DL (ref 0–199)
CO2 SERPL-SCNC: 26 MMOL/L (ref 22–29)
CREAT SERPL-MCNC: 0.9 MG/DL (ref 0.7–1.2)
ERYTHROCYTE [DISTWIDTH] IN BLOOD BY AUTOMATED COUNT: 13.5 % (ref 11.5–14.5)
GLUCOSE SERPL-MCNC: 107 MG/DL (ref 70–99)
HCT VFR BLD AUTO: 42.5 % (ref 42–52)
HDLC SERPL-MCNC: 67 MG/DL (ref 40–60)
HGB BLD-MCNC: 14 G/DL (ref 14–18)
LDLC SERPL CALC-MCNC: 46 MG/DL
MCH RBC QN AUTO: 29.7 PG (ref 27–31)
MCHC RBC AUTO-ENTMCNC: 32.9 G/DL (ref 33–37)
MCV RBC AUTO: 90 FL (ref 80–94)
PLATELET # BLD AUTO: 200 K/UL (ref 130–400)
PMV BLD AUTO: 11.1 FL (ref 9.4–12.4)
POTASSIUM SERPL-SCNC: 4.8 MMOL/L (ref 3.5–5)
PROT SERPL-MCNC: 6.7 G/DL (ref 6.4–8.3)
RBC # BLD AUTO: 4.72 M/UL (ref 4.7–6.1)
SODIUM SERPL-SCNC: 142 MMOL/L (ref 136–145)
TRIGL SERPL-MCNC: 83 MG/DL (ref 0–149)
TSH SERPL DL<=0.005 MIU/L-ACNC: 2.49 UIU/ML (ref 0.27–4.2)
WBC # BLD AUTO: 7 K/UL (ref 4.8–10.8)

## 2024-09-04 LAB — PSA SERPL-MCNC: 1.02 NG/ML (ref 0–4)

## 2024-09-06 SDOH — HEALTH STABILITY: PHYSICAL HEALTH: ON AVERAGE, HOW MANY DAYS PER WEEK DO YOU ENGAGE IN MODERATE TO STRENUOUS EXERCISE (LIKE A BRISK WALK)?: 3 DAYS

## 2024-09-06 SDOH — HEALTH STABILITY: PHYSICAL HEALTH: ON AVERAGE, HOW MANY MINUTES DO YOU ENGAGE IN EXERCISE AT THIS LEVEL?: 40 MIN

## 2024-09-06 ASSESSMENT — PATIENT HEALTH QUESTIONNAIRE - PHQ9
1. LITTLE INTEREST OR PLEASURE IN DOING THINGS: NOT AT ALL
SUM OF ALL RESPONSES TO PHQ QUESTIONS 1-9: 0
2. FEELING DOWN, DEPRESSED OR HOPELESS: NOT AT ALL
SUM OF ALL RESPONSES TO PHQ QUESTIONS 1-9: 0
SUM OF ALL RESPONSES TO PHQ QUESTIONS 1-9: 0
SUM OF ALL RESPONSES TO PHQ9 QUESTIONS 1 & 2: 0
SUM OF ALL RESPONSES TO PHQ QUESTIONS 1-9: 0

## 2024-09-06 ASSESSMENT — LIFESTYLE VARIABLES
HOW MANY STANDARD DRINKS CONTAINING ALCOHOL DO YOU HAVE ON A TYPICAL DAY: 1 OR 2
HOW OFTEN DO YOU HAVE A DRINK CONTAINING ALCOHOL: MONTHLY OR LESS

## 2024-09-16 DIAGNOSIS — K21.9 GASTROESOPHAGEAL REFLUX DISEASE WITHOUT ESOPHAGITIS: ICD-10-CM

## 2024-09-16 SDOH — HEALTH STABILITY: PHYSICAL HEALTH: ON AVERAGE, HOW MANY MINUTES DO YOU ENGAGE IN EXERCISE AT THIS LEVEL?: 40 MIN

## 2024-09-16 SDOH — ECONOMIC STABILITY: FOOD INSECURITY: WITHIN THE PAST 12 MONTHS, YOU WORRIED THAT YOUR FOOD WOULD RUN OUT BEFORE YOU GOT MONEY TO BUY MORE.: NEVER TRUE

## 2024-09-16 SDOH — HEALTH STABILITY: PHYSICAL HEALTH: ON AVERAGE, HOW MANY DAYS PER WEEK DO YOU ENGAGE IN MODERATE TO STRENUOUS EXERCISE (LIKE A BRISK WALK)?: 3 DAYS

## 2024-09-16 SDOH — ECONOMIC STABILITY: INCOME INSECURITY: HOW HARD IS IT FOR YOU TO PAY FOR THE VERY BASICS LIKE FOOD, HOUSING, MEDICAL CARE, AND HEATING?: NOT HARD AT ALL

## 2024-09-16 SDOH — ECONOMIC STABILITY: FOOD INSECURITY: WITHIN THE PAST 12 MONTHS, THE FOOD YOU BOUGHT JUST DIDN'T LAST AND YOU DIDN'T HAVE MONEY TO GET MORE.: NEVER TRUE

## 2024-09-16 ASSESSMENT — PATIENT HEALTH QUESTIONNAIRE - PHQ9
SUM OF ALL RESPONSES TO PHQ QUESTIONS 1-9: 0
SUM OF ALL RESPONSES TO PHQ QUESTIONS 1-9: 0
1. LITTLE INTEREST OR PLEASURE IN DOING THINGS: NOT AT ALL
2. FEELING DOWN, DEPRESSED OR HOPELESS: NOT AT ALL
SUM OF ALL RESPONSES TO PHQ9 QUESTIONS 1 & 2: 0
SUM OF ALL RESPONSES TO PHQ QUESTIONS 1-9: 0
SUM OF ALL RESPONSES TO PHQ QUESTIONS 1-9: 0

## 2024-09-16 ASSESSMENT — LIFESTYLE VARIABLES
HOW MANY STANDARD DRINKS CONTAINING ALCOHOL DO YOU HAVE ON A TYPICAL DAY: 1 OR 2
HOW OFTEN DO YOU HAVE A DRINK CONTAINING ALCOHOL: MONTHLY OR LESS
HOW MANY STANDARD DRINKS CONTAINING ALCOHOL DO YOU HAVE ON A TYPICAL DAY: 1
HOW OFTEN DO YOU HAVE A DRINK CONTAINING ALCOHOL: 2
HOW OFTEN DO YOU HAVE SIX OR MORE DRINKS ON ONE OCCASION: 1

## 2024-09-18 ENCOUNTER — OFFICE VISIT (OUTPATIENT)
Dept: INTERNAL MEDICINE | Age: 69
End: 2024-09-18
Payer: MEDICARE

## 2024-09-18 VITALS
SYSTOLIC BLOOD PRESSURE: 130 MMHG | DIASTOLIC BLOOD PRESSURE: 70 MMHG | HEART RATE: 47 BPM | WEIGHT: 207 LBS | BODY MASS INDEX: 30.66 KG/M2 | HEIGHT: 69 IN | OXYGEN SATURATION: 99 %

## 2024-09-18 DIAGNOSIS — Z00.00 MEDICARE ANNUAL WELLNESS VISIT, SUBSEQUENT: Primary | ICD-10-CM

## 2024-09-18 DIAGNOSIS — I71.010 DISSECTION OF ASCENDING AORTA (HCC): ICD-10-CM

## 2024-09-18 DIAGNOSIS — E78.00 PURE HYPERCHOLESTEROLEMIA: ICD-10-CM

## 2024-09-18 DIAGNOSIS — Z79.01 CURRENT USE OF LONG TERM ANTICOAGULATION: ICD-10-CM

## 2024-09-18 DIAGNOSIS — Z23 NEED FOR PROPHYLACTIC VACCINATION AGAINST STREPTOCOCCUS PNEUMONIAE (PNEUMOCOCCUS): ICD-10-CM

## 2024-09-18 DIAGNOSIS — I10 ESSENTIAL HYPERTENSION: ICD-10-CM

## 2024-09-18 DIAGNOSIS — Z12.5 SCREENING FOR PROSTATE CANCER: ICD-10-CM

## 2024-09-18 DIAGNOSIS — R97.20 ELEVATED PSA: ICD-10-CM

## 2024-09-18 DIAGNOSIS — K20.90 ESOPHAGITIS: ICD-10-CM

## 2024-09-18 DIAGNOSIS — Z13.1 SCREENING FOR DIABETES MELLITUS: ICD-10-CM

## 2024-09-18 DIAGNOSIS — D68.69 SECONDARY HYPERCOAGULABLE STATE (HCC): ICD-10-CM

## 2024-09-18 DIAGNOSIS — G47.33 OSA (OBSTRUCTIVE SLEEP APNEA): ICD-10-CM

## 2024-09-18 DIAGNOSIS — I25.10 CORONARY ARTERY DISEASE INVOLVING NATIVE CORONARY ARTERY OF NATIVE HEART WITHOUT ANGINA PECTORIS: ICD-10-CM

## 2024-09-18 DIAGNOSIS — N40.1 BENIGN PROSTATIC HYPERPLASIA WITH LOWER URINARY TRACT SYMPTOMS, SYMPTOM DETAILS UNSPECIFIED: ICD-10-CM

## 2024-09-18 DIAGNOSIS — Z23 ENCOUNTER FOR PREVNAR PNEUMOCOCCAL VACCINATION: ICD-10-CM

## 2024-09-18 DIAGNOSIS — I48.0 PAROXYSMAL ATRIAL FIBRILLATION (HCC): ICD-10-CM

## 2024-09-18 LAB — HBA1C MFR BLD: 5.7 %

## 2024-09-18 PROCEDURE — G0009 ADMIN PNEUMOCOCCAL VACCINE: HCPCS | Performed by: INTERNAL MEDICINE

## 2024-09-18 PROCEDURE — 83036 HEMOGLOBIN GLYCOSYLATED A1C: CPT | Performed by: INTERNAL MEDICINE

## 2024-09-18 PROCEDURE — 3075F SYST BP GE 130 - 139MM HG: CPT | Performed by: INTERNAL MEDICINE

## 2024-09-18 PROCEDURE — G8417 CALC BMI ABV UP PARAM F/U: HCPCS | Performed by: INTERNAL MEDICINE

## 2024-09-18 PROCEDURE — 3078F DIAST BP <80 MM HG: CPT | Performed by: INTERNAL MEDICINE

## 2024-09-18 PROCEDURE — 90677 PCV20 VACCINE IM: CPT | Performed by: INTERNAL MEDICINE

## 2024-09-18 PROCEDURE — 99213 OFFICE O/P EST LOW 20 MIN: CPT | Performed by: INTERNAL MEDICINE

## 2024-09-18 PROCEDURE — 4004F PT TOBACCO SCREEN RCVD TLK: CPT | Performed by: INTERNAL MEDICINE

## 2024-09-18 PROCEDURE — G0439 PPPS, SUBSEQ VISIT: HCPCS | Performed by: INTERNAL MEDICINE

## 2024-09-18 PROCEDURE — G8427 DOCREV CUR MEDS BY ELIG CLIN: HCPCS | Performed by: INTERNAL MEDICINE

## 2024-09-18 PROCEDURE — 3017F COLORECTAL CA SCREEN DOC REV: CPT | Performed by: INTERNAL MEDICINE

## 2024-09-18 PROCEDURE — 1123F ACP DISCUSS/DSCN MKR DOCD: CPT | Performed by: INTERNAL MEDICINE

## 2024-09-18 SDOH — ECONOMIC STABILITY: FOOD INSECURITY: WITHIN THE PAST 12 MONTHS, THE FOOD YOU BOUGHT JUST DIDN'T LAST AND YOU DIDN'T HAVE MONEY TO GET MORE.: NEVER TRUE

## 2024-09-18 SDOH — ECONOMIC STABILITY: FOOD INSECURITY: WITHIN THE PAST 12 MONTHS, YOU WORRIED THAT YOUR FOOD WOULD RUN OUT BEFORE YOU GOT MONEY TO BUY MORE.: NEVER TRUE

## 2024-09-18 SDOH — ECONOMIC STABILITY: INCOME INSECURITY: HOW HARD IS IT FOR YOU TO PAY FOR THE VERY BASICS LIKE FOOD, HOUSING, MEDICAL CARE, AND HEATING?: NOT HARD AT ALL

## 2024-09-18 ASSESSMENT — ENCOUNTER SYMPTOMS
ABDOMINAL DISTENTION: 0
BACK PAIN: 0
SINUS PRESSURE: 0
COUGH: 0
ABDOMINAL PAIN: 0
EYE DISCHARGE: 0
EYE REDNESS: 0
SHORTNESS OF BREATH: 0

## 2024-09-30 ENCOUNTER — TELEPHONE (OUTPATIENT)
Dept: UROLOGY | Facility: CLINIC | Age: 69
End: 2024-09-30

## 2024-09-30 DIAGNOSIS — Z87.442 HISTORY OF KIDNEY STONES: Primary | ICD-10-CM

## 2024-09-30 NOTE — TELEPHONE ENCOUNTER
Caller: Pawel Thompson    Relationship: Self    Best call back number: [phone]    What orders are you requesting (i.e. lab or imaging): MICHAEL    In what timeframe would the patient need to come in: BEFORE APPT ON 11/20/24    Where will you receive your lab/imaging services:  [facility]

## 2024-10-02 ENCOUNTER — OFFICE VISIT (OUTPATIENT)
Dept: CARDIAC SURGERY | Facility: CLINIC | Age: 69
End: 2024-10-02
Payer: MEDICARE

## 2024-10-02 ENCOUNTER — HOSPITAL ENCOUNTER (OUTPATIENT)
Dept: CT IMAGING | Facility: HOSPITAL | Age: 69
Discharge: HOME | End: 2024-10-02
Admitting: NURSE PRACTITIONER
Payer: MEDICARE

## 2024-10-02 VITALS
HEART RATE: 49 BPM | HEIGHT: 69 IN | DIASTOLIC BLOOD PRESSURE: 64 MMHG | SYSTOLIC BLOOD PRESSURE: 134 MMHG | OXYGEN SATURATION: 98 % | BODY MASS INDEX: 30.66 KG/M2 | WEIGHT: 207 LBS

## 2024-10-02 DIAGNOSIS — I71.010 DISSECTION OF ASCENDING AORTA: ICD-10-CM

## 2024-10-02 DIAGNOSIS — I71.21 ANEURYSM OF ASCENDING AORTA WITHOUT RUPTURE: ICD-10-CM

## 2024-10-02 DIAGNOSIS — I71.21 ANEURYSM OF ASCENDING AORTA WITHOUT RUPTURE: Primary | ICD-10-CM

## 2024-10-02 DIAGNOSIS — I77.810 AORTIC ROOT DILATION: ICD-10-CM

## 2024-10-02 PROCEDURE — 25510000001 IOPAMIDOL PER 1 ML: Performed by: NURSE PRACTITIONER

## 2024-10-02 PROCEDURE — 71275 CT ANGIOGRAPHY CHEST: CPT

## 2024-10-02 RX ORDER — IOPAMIDOL 755 MG/ML
100 INJECTION, SOLUTION INTRAVASCULAR
Status: COMPLETED | OUTPATIENT
Start: 2024-10-02 | End: 2024-10-02

## 2024-10-02 RX ADMIN — IOPAMIDOL 100 ML: 755 INJECTION, SOLUTION INTRAVENOUS at 08:23

## 2024-10-20 NOTE — PROGRESS NOTES
Subjective   Patient ID: Pawel Thompson is a 69 y.o. male who is here for follow-up of a known aneurysm.   Chief Complaint   Patient presents with    Dissection of Ascending Aorta     Pt is here for follow up w/ CT     History of Present Illness  The patient presents for evaluation of aortic dissection.    He reports no chest pain or shortness of breath, indicating that his stent is functioning well. He has been diagnosed with an aneurysm, which currently does not require surgical intervention.    Six months ago, he developed a heart murmur, a condition he had not previously experienced. An echocardiogram was performed to investigate this new development.    He has been advised against heavy lifting and has adhered to this advice. His exercise routine includes 45 minutes on the treadmill every other day. However, he has been experiencing difficulty with his hands, which has affected his ability to lift objects.    The following portions of the patient's history were reviewed and updated as appropriate: allergies, current medications, past family history, past medical history, past social history, past surgical history and problem list.       Objective   Physical Exam  Physical Exam  Patient is in no acute distress, appropriate, alert.  Lungs are clear to auscultation bilaterally without wheezing, rubs, or rales.  Heart has a regular rate and rhythm. A 1 out of 6 systolic murmur is present. No rubs, no gallops.  Abdomen is soft, nondistended, nontender.  No clubbing, cyanosis or edema in the extremities.      CT Angiogram Chest    Result Date: 10/2/2024  Narrative: EXAMINATION: CT ANGIOGRAM CHEST- 10/2/2024 8:53 AM  HISTORY: Aortic aneurysm, known or suspected; I71.010-Dissection of ascending aorta; I71.21-Aneurysm of the ascending aorta, without rupture  TOTAL DOSE: 439.17 mGy.cm (Automatic exposure control technique was implemented in an effort to keep the radiation dose as low as possible without compromising  image quality)  REPORT: Spiral CT of the chest was performed after administration of intravenous contrast from the thoracic inlet through the upper abdomen using CTA protocol, which includes reconstructed maximum intensity projection (MIP)coronal and sagittal images.  Comparison: CT angio chest 3/22/2024..  The contrast bolus is satisfactory, the dissection flap and intramural hematoma seen along the right lateral wall of the ascending thoracic aorta on previous imaging studies is no longer identified. No dissection flap is seen. The ascending aorta has a maximum diameter of 3.8 cm. There is mild dilation of the central pulmonary arteries suggestive of some degree of pulmonary arterial hypertension. Heart size is normal. There is previous median sternotomy. Heavy calcified plaque is noted within the native coronary arteries. No intrathoracic lymphadenopathy is identified. The thyroid gland appears normal. No pleural effusion is identified.  Review of lung windows demonstrates normal aeration of the lungs. The airways are patent. No pneumothorax is identified. There are a few scattered calcified granulomas within the lungs. No noncalcified pulmonary nodule or mass. The visualized upper abdomen demonstrates decreased attenuation of the liver parenchyma likely related to steatosis. There is a small sliding-type hiatal hernia. Review of bone windows is unremarkable.      Impression: 1. No residual evidence of a dissection flap or intramural hematoma involving the ascending aorta. No acute findings. Ectasia of the ascending aorta with a maximum diameter 3.8 cm. Evidence of previous median sternotomy, CABG. Heavy calcified plaque within the native coronary arteries as before.      This report was signed and finalized on 10/2/2024 9:05 AM by Dr. Tato Pickett MD.     Results  Imaging  CTA chest reveals no residual acute event of the previously noted Francesco a dissection following a  procedure. The aortic root measures 3.8  cm in size. The ascending aorta in greatest dimension measures 4.2 cm in size. The distal ascending aorta measures 3.9 cm in size. The descending thoracic aorta is without remark. The aortic root measures 3.7 cm in size best seen on axial imaging likely.    Diagnoses and all orders for this visit:    1. Aneurysm of ascending aorta without rupture (Primary)  -     CT angiogram chest w contrast; Future    2. Aortic root dilation  -     CT angiogram chest w contrast; Future    3. Dissection of ascending aorta  -     CT angiogram chest w contrast; Future          Assessment & Plan     Assessment & Plan  1. Healed type A dissection post procedure.  Independent interpretation of October 2, 2024, CTA chest reveals no residual acute event of the previously noted Type A dissection following a  procedure. The dissection has fully healed. No further follow-up is required for the dissection itself.    2. Aortic root dilation.  The aortic root measures 3.8 cm in size. Continued annual imaging surveillance with a CTA chest is recommended.    3. Ascending aortic aneurysm.  The ascending aorta measures 4.2 cm in size. Current guidelines for an aneurysm of this size recommend a CAT scan again in a year. If the aneurysm shows rapid growth, more aggressive intervention may be needed. Exercise restrictions were discussed, and he is advised to avoid heavy lifting but encouraged to engage in regular exercise, including muscle-strengthening activities.    4. Mild aortic valve abnormalities.  Echocardiogram findings show no significant gradient and only mild valvular abnormalities. No further recommendations are needed in this regard.    5. Coronary artery disease, asymptomatic.  He is asymptomatic and doing well from a coronary artery point of view. He is advised to continue his current exercise regimen and follow aneurysm restrictions.    Follow-up  Return in 1 year for a CT of the chest.     He is a non smoker    Many thanks for the  opportunity to care for your patient.    I will continue to keep you apprised of provided care as it ensues.          Patient or patient representative verbalized consent for the use of Ambient Listening during the visit with  Indra Lepe MD for chart documentation. 10/20/2024  08:22 CDT

## 2024-10-28 ENCOUNTER — OFFICE VISIT (OUTPATIENT)
Dept: GASTROENTEROLOGY | Facility: CLINIC | Age: 69
End: 2024-10-28
Payer: MEDICARE

## 2024-10-28 VITALS
DIASTOLIC BLOOD PRESSURE: 80 MMHG | TEMPERATURE: 96.8 F | HEIGHT: 69 IN | HEART RATE: 59 BPM | OXYGEN SATURATION: 98 % | SYSTOLIC BLOOD PRESSURE: 140 MMHG | BODY MASS INDEX: 31.13 KG/M2 | WEIGHT: 210.2 LBS

## 2024-10-28 DIAGNOSIS — K21.9 GASTROESOPHAGEAL REFLUX DISEASE WITHOUT ESOPHAGITIS: ICD-10-CM

## 2024-10-28 PROCEDURE — 1159F MED LIST DOCD IN RCRD: CPT | Performed by: CLINICAL NURSE SPECIALIST

## 2024-10-28 PROCEDURE — 99213 OFFICE O/P EST LOW 20 MIN: CPT | Performed by: CLINICAL NURSE SPECIALIST

## 2024-10-28 PROCEDURE — 1160F RVW MEDS BY RX/DR IN RCRD: CPT | Performed by: CLINICAL NURSE SPECIALIST

## 2024-10-28 PROCEDURE — 3079F DIAST BP 80-89 MM HG: CPT | Performed by: CLINICAL NURSE SPECIALIST

## 2024-10-28 PROCEDURE — 3077F SYST BP >= 140 MM HG: CPT | Performed by: CLINICAL NURSE SPECIALIST

## 2024-10-28 NOTE — PROGRESS NOTES
Pawel Thompson  1955    10/28/2024  Chief Complaint   Patient presents with    GI Problem     yearly     Subjective   HPI  Pawel Thompson is a 69 y.o. male who presents with a complaint of GERD with hx of esophagitis he is stable with his Prilosec. He denies any dysphagia. No wt loss. No epigastric pain. Endoscopy reviewed last year chronic esophagogastritis noted. No wt loss. No fever chills or sweats. No change in bowels.  Past Medical History:   Diagnosis Date    Acid reflux     Arthritis     Benign prostatic hyperplasia with lower urinary tract symptoms 12/06/2017    Coronary artery disease     COVID-19     Elevated PSA 12/06/2016    Heart murmur     Hyperlipidemia     Hypertension     New onset atrial fibrillation 08/31/2019    Sleep apnea     does not wear machine     Past Surgical History:   Procedure Laterality Date    APPENDECTOMY      CARDIAC CATHETERIZATION      CARDIAC CATHETERIZATION N/A 01/11/2024    Procedure: Coronary angiography;  Surgeon: Tato Lorenzana MD;  Location:  [facility];  Service: Cardiology;  Laterality: N/A;    CARDIAC CATHETERIZATION N/A 01/18/2024    Procedure: Chronic Total Occlussion;  Surgeon: Tato Lorenzana MD;  Location:  [facility];  Service: Cardiology;  Laterality: N/A;    COLONOSCOPY  09/11/2012    Tics repeat exam in 10 years    COLONOSCOPY N/A 12/01/2022    Dr. Macias-normal exam    CORONARY ARTERY BYPASS GRAFT      3 bypasses    ENDOSCOPY N/A 12/01/2022    Dr. macias-Fragment of benign gastric mucosa demonstrating chronic active inflammation, moderate. B. No intestinal metaplasia identified. C. No dysplasia identified    ENDOSCOPY N/A 06/27/2023    Dr. macias-Chronic active esophagogastritis, moderate. B. No intestinal metaplasia identified. C. No dysplasia identified    TRIGGER FINGER RELEASE Left 03/07/2023    Procedure: LEFT MIDDLE TRIGGER FINGER RELEASE;  Surgeon: Warren Houser MD;  Location:  [facility] OR;  Service:  Orthopedics;  Laterality: Left;       Outpatient Medications Marked as Taking for the 10/28/24 encounter (Office Visit) with Cheryl Call APRN   Medication Sig Dispense Refill    apixaban (ELIQUIS) 5 MG tablet tablet Take 1 tablet by mouth 2 (Two) Times a Day.      atenolol (TENORMIN) 25 MG tablet Take 1 tablet by mouth Every Night.      atorvastatin (LIPITOR) 40 MG tablet Take 1 tablet by mouth Every Night. 90 tablet 3    finasteride (PROSCAR) 5 MG tablet Take 1 tablet by mouth Daily. 90 tablet 3    losartan (COZAAR) 25 MG tablet Take 1 tablet by mouth Every Night.      Omega-3 Fatty Acids (fish oil) 1000 MG capsule capsule Take 1 capsule by mouth Every Night.      omeprazole (priLOSEC) 20 MG capsule Take 1 capsule by mouth 2 (Two) Times a Day. 60 capsule 10    PATIENT SUPPLIED MEDICATION Take 2 tablets by mouth Every Night. 4Life Transfer Factor Plus vitamin with Zinc      tamsulosin (FLOMAX) 0.4 MG capsule 24 hr capsule Take 1 capsule by mouth Every Night. 90 capsule 3    ticagrelor (BRILINTA) 90 MG tablet tablet Take 1 tablet by mouth 2 (Two) Times a Day. 180 tablet 3    [DISCONTINUED] omeprazole (priLOSEC) 20 MG capsule Take 1 capsule by mouth 2 (Two) Times a Day. 60 capsule 1     Allergies   Allergen Reactions    Ramipril Angioedema     Lips swell, hives     Social History     Socioeconomic History    Marital status:    Tobacco Use    Smoking status: Never     Passive exposure: Past    Smokeless tobacco: Never   Vaping Use    Vaping status: Never Used   Substance and Sexual Activity    Alcohol use: Yes     Alcohol/week: 1.0 standard drink of alcohol     Types: 1 Cans of beer per week     Comment: occasionally    Drug use: Never    Sexual activity: Not Currently     Partners: Female     Family History   Problem Relation Age of Onset    Heart disease Mother     Stroke Mother     Prostate cancer Father     Heart disease Father     Coronary artery disease Father     Heart attack Father     Heart  attack Brother     Heart disease Brother     Sudden death Brother     No Known Problems Brother     No Known Problems Maternal Grandmother     No Known Problems Maternal Grandfather     Heart disease Paternal Grandmother     No Known Problems Paternal Grandfather     Colon cancer Neg Hx     Colon polyps Neg Hx      Health Maintenance   Topic Date Due    ZOSTER VACCINE (2 of 3) 10/19/2015    HEPATITIS C SCREENING  Never done    Pneumococcal Vaccine 65+ (1 of 1 - PCV) Never done    BMI FOLLOWUP  10/31/2023    INFLUENZA VACCINE  Never done    COVID-19 Vaccine (3 - 2023-24 season) 09/01/2024    LIPID PANEL  09/03/2025    ANNUAL WELLNESS VISIT  09/18/2025    TDAP/TD VACCINES (3 - Td or Tdap) 04/11/2026    COLORECTAL CANCER SCREENING  12/01/2032     Review of Systems   Constitutional:  Negative for activity change, appetite change, chills, diaphoresis, fatigue, fever and unexpected weight change.   HENT:  Negative for ear pain, hearing loss, mouth sores, sore throat, trouble swallowing and voice change.    Eyes: Negative.    Respiratory:  Negative for cough, choking, shortness of breath and wheezing.    Cardiovascular:  Negative for chest pain and palpitations.   Gastrointestinal:  Negative for abdominal pain, blood in stool, constipation, diarrhea, nausea and vomiting.   Endocrine: Negative for cold intolerance and heat intolerance.   Genitourinary:  Negative for decreased urine volume, dysuria, frequency, hematuria and urgency.   Musculoskeletal:  Negative for back pain, gait problem and myalgias.   Skin:  Negative for color change, pallor and rash.   Allergic/Immunologic: Negative for food allergies and immunocompromised state.   Neurological:  Negative for dizziness, tremors, seizures, syncope, weakness, light-headedness, numbness and headaches.   Hematological:  Negative for adenopathy. Does not bruise/bleed easily.   Psychiatric/Behavioral:  Negative for agitation and confusion. The patient is not nervous/anxious.   "  All other systems reviewed and are negative.    Objective   Vitals:    10/28/24 1018   BP: 140/80   BP Location: Left arm   Pulse: 59   Temp: 96.8 °F (36 °C)   TempSrc: Temporal   SpO2: 98%   Weight: 95.3 kg (210 lb 3.2 oz)   Height: 175.3 cm (69.02\")     Body mass index is 31.03 kg/m².  Physical Exam  Constitutional:       Appearance: He is well-developed.   HENT:      Head: Normocephalic and atraumatic.   Eyes:      Pupils: Pupils are equal, round, and reactive to light.   Neck:      Trachea: No tracheal deviation.   Cardiovascular:      Rate and Rhythm: Normal rate and regular rhythm.      Heart sounds: Normal heart sounds. No murmur heard.     No friction rub. No gallop.   Pulmonary:      Effort: Pulmonary effort is normal. No respiratory distress.      Breath sounds: Normal breath sounds. No wheezing or rales.   Chest:      Chest wall: No tenderness.   Abdominal:      General: Bowel sounds are normal. There is no distension.      Palpations: Abdomen is soft. Abdomen is not rigid.      Tenderness: There is no abdominal tenderness. There is no guarding or rebound.   Musculoskeletal:         General: No tenderness or deformity. Normal range of motion.      Cervical back: Normal range of motion and neck supple.   Skin:     General: Skin is warm and dry.      Coloration: Skin is not pale.      Findings: No rash.   Neurological:      Mental Status: He is alert and oriented to person, place, and time.      Deep Tendon Reflexes: Reflexes are normal and symmetric.   Psychiatric:         Behavior: Behavior normal.         Thought Content: Thought content normal.         Judgment: Judgment normal.       Assessment & Plan   Diagnoses and all orders for this visit:    1. Gastroesophageal reflux disease without esophagitis  -     omeprazole (priLOSEC) 20 MG capsule; Take 1 capsule by mouth 2 (Two) Times a Day.  Dispense: 60 capsule; Refill: 10      * Surgery not found *  Part of this note may be an electronic " transcription/translation of spoken language to printed text using the Dragon Dictation System.  Body mass index is 31.03 kg/m².  No follow-ups on file.  There are no Patient Instructions on file for this visit.  BMI is >= 30 and <35. (Class 1 Obesity). The following options were offered after discussion;: weight loss educational material (shared in after visit summary)      All risks, benefits, alternatives, and indications of colonoscopy and/or Endoscopy procedure have been discussed with the patient. Risks to include perforation of the colon requiring possible surgery or colostomy, risk of bleeding from biopsies or removal of colon tissue, possibility of missing a colon polyp or cancer, or adverse drug reaction.  Benefits to include the diagnosis and management of disease of the colon and rectum. Alternatives to include barium enema, radiographic evaluation, lab testing or no intervention. Pt verbalizes understanding and agrees.     Cheryl Call, APRN  10/28/2024  10:46 CDT          If you smoke or use tobacco, 4 minutes reading provided  Steps to Quit Smoking  Smoking tobacco can be harmful to your health and can affect almost every organ in your body. Smoking puts you, and those around you, at risk for developing many serious chronic diseases. Quitting smoking is difficult, but it is one of the best things that you can do for your health. It is never too late to quit.  What are the benefits of quitting smoking?  When you quit smoking, you lower your risk of developing serious diseases and conditions, such as:  Lung cancer or lung disease, such as COPD.  Heart disease.  Stroke.  Heart attack.  Infertility.  Osteoporosis and bone fractures.  Additionally, symptoms such as coughing, wheezing, and shortness of breath may get better when you quit. You may also find that you get sick less often because your body is stronger at fighting off colds and infections. If you are pregnant, quitting smoking can help to  reduce your chances of having a baby of low birth weight.  How do I get ready to quit?  When you decide to quit smoking, create a plan to make sure that you are successful. Before you quit:  Pick a date to quit. Set a date within the next two weeks to give you time to prepare.  Write down the reasons why you are quitting. Keep this list in places where you will see it often, such as on your bathroom mirror or in your car or wallet.  Identify the people, places, things, and activities that make you want to smoke (triggers) and avoid them. Make sure to take these actions:  Throw away all cigarettes at home, at work, and in your car.  Throw away smoking accessories, such as ashtrays and lighters.  Clean your car and make sure to empty the ashtray.  Clean your home, including curtains and carpets.  Tell your family, friends, and coworkers that you are quitting. Support from your loved ones can make quitting easier.  Talk with your health care provider about your options for quitting smoking.  Find out what treatment options are covered by your health insurance.  What strategies can I use to quit smoking?  Talk with your healthcare provider about different strategies to quit smoking. Some strategies include:  Quitting smoking altogether instead of gradually lessening how much you smoke over a period of time. Research shows that quitting “cold turkey” is more successful than gradually quitting.  Attending in-person counseling to help you build problem-solving skills. You are more likely to have success in quitting if you attend several counseling sessions. Even short sessions of 10 minutes can be effective.  Finding resources and support systems that can help you to quit smoking and remain smoke-free after you quit. These resources are most helpful when you use them often. They can include:  Online chats with a counselor.  Telephone quitlines.  Printed self-help materials.  Support groups or group counseling.  Text  messaging programs.  Mobile phone applications.  Taking medicines to help you quit smoking. (If you are pregnant or breastfeeding, talk with your health care provider first.) Some medicines contain nicotine and some do not. Both types of medicines help with cravings, but the medicines that include nicotine help to relieve withdrawal symptoms. Your health care provider may recommend:  Nicotine patches, gum, or lozenges.  Nicotine inhalers or sprays.  Non-nicotine medicine that is taken by mouth.  Talk with your health care provider about combining strategies, such as taking medicines while you are also receiving in-person counseling. Using these two strategies together makes you more likely to succeed in quitting than if you used either strategy on its own.  If you are pregnant or breastfeeding, talk with your health care provider about finding counseling or other support strategies to quit smoking. Do not take medicine to help you quit smoking unless told to do so by your health care provider.  What things can I do to make it easier to quit?  Quitting smoking might feel overwhelming at first, but there is a lot that you can do to make it easier. Take these important actions:  Reach out to your family and friends and ask that they support and encourage you during this time. Call telephone quitlines, reach out to support groups, or work with a counselor for support.  Ask people who smoke to avoid smoking around you.  Avoid places that trigger you to smoke, such as bars, parties, or smoke-break areas at work.  Spend time around people who do not smoke.  Lessen stress in your life, because stress can be a smoking trigger for some people. To lessen stress, try:  Exercising regularly.  Deep-breathing exercises.  Yoga.  Meditating.  Performing a body scan. This involves closing your eyes, scanning your body from head to toe, and noticing which parts of your body are particularly tense. Purposefully relax the muscles in  those areas.  Download or purchase mobile phone or tablet apps (applications) that can help you stick to your quit plan by providing reminders, tips, and encouragement. There are many free apps, such as QuitGuide from the CDC (Centers for Disease Control and Prevention). You can find other support for quitting smoking (smoking cessation) through smokefree.gov and other websites.  How will I feel when I quit smoking?  Within the first 24 hours of quitting smoking, you may start to feel some withdrawal symptoms. These symptoms are usually most noticeable 2-3 days after quitting, but they usually do not last beyond 2-3 weeks. Changes or symptoms that you might experience include:  Mood swings.  Restlessness, anxiety, or irritation.  Difficulty concentrating.  Dizziness.  Strong cravings for sugary foods in addition to nicotine.  Mild weight gain.  Constipation.  Nausea.  Coughing or a sore throat.  Changes in how your medicines work in your body.  A depressed mood.  Difficulty sleeping (insomnia).  After the first 2-3 weeks of quitting, you may start to notice more positive results, such as:  Improved sense of smell and taste.  Decreased coughing and sore throat.  Slower heart rate.  Lower blood pressure.  Clearer skin.  The ability to breathe more easily.  Fewer sick days.  Quitting smoking is very challenging for most people. Do not get discouraged if you are not successful the first time. Some people need to make many attempts to quit before they achieve long-term success. Do your best to stick to your quit plan, and talk with your health care provider if you have any questions or concerns.  This information is not intended to replace advice given to you by your health care provider. Make sure you discuss any questions you have with your health care provider.  Document Released: 12/12/2002 Document Revised: 08/15/2017 Document Reviewed: 05/03/2016  [facility] Interactive Patient Education © 2017 [employer]

## 2024-11-15 NOTE — PROGRESS NOTES
Subjective    Mr. Thompson is 69 y.o. male    Chief Complaint: Ureteral stone    History of Present Illness    69-year-old male established patient on Eliquis and Brilinta follow-up for enlarged prostate on finasteride and tamsulosin and history of kidney stone.  His LUTS are currently controlled on combination therapy.   He reports a family history of prostate cancer.  PSA remains normal.  He does have difficulty obtaining and maintaining erections but desires no treatment at this time.  UA today is clear.  KUB x-ray done today reviewed by me with patient shows no evidence for kidney stone.  He denies flank pain or hematuria.      I independently visualized and reviewed the patient's prior imaging studies today in clinic and discussed the imaging findings with the patient.    The following portions of the patient's history were reviewed and updated as appropriate: allergies, current medications, past family history, past medical history, past social history, past surgical history and problem list.    Review of Systems      Current Outpatient Medications:     apixaban (ELIQUIS) 5 MG tablet tablet, Take 1 tablet by mouth 2 (Two) Times a Day., Disp: , Rfl:     atenolol (TENORMIN) 25 MG tablet, Take 1 tablet by mouth Every Night., Disp: , Rfl:     atorvastatin (LIPITOR) 40 MG tablet, Take 1 tablet by mouth Every Night., Disp: 90 tablet, Rfl: 3    finasteride (PROSCAR) 5 MG tablet, Take 1 tablet by mouth Daily., Disp: 90 tablet, Rfl: 3    losartan (COZAAR) 25 MG tablet, Take 1 tablet by mouth Every Night., Disp: , Rfl:     Omega-3 Fatty Acids (fish oil) 1000 MG capsule capsule, Take 1 capsule by mouth Every Night., Disp: , Rfl:     omeprazole (priLOSEC) 20 MG capsule, Take 1 capsule by mouth 2 (Two) Times a Day., Disp: 60 capsule, Rfl: 10    PATIENT SUPPLIED MEDICATION, Take 2 tablets by mouth Every Night. 4Life Transfer Factor Plus vitamin with Zinc, Disp: , Rfl:     tamsulosin (FLOMAX) 0.4 MG capsule 24 hr capsule, Take  1 capsule by mouth Every Night., Disp: 90 capsule, Rfl: 3    ticagrelor (BRILINTA) 90 MG tablet tablet, Take 1 tablet by mouth 2 (Two) Times a Day., Disp: 180 tablet, Rfl: 3    Past Medical History:   Diagnosis Date    Acid reflux     Arthritis     Benign prostatic hyperplasia with lower urinary tract symptoms 12/06/2017    Coronary artery disease     COVID-19     Elevated PSA 12/06/2016    Heart murmur     Hyperlipidemia     Hypertension     New onset atrial fibrillation 08/31/2019    Sleep apnea     does not wear machine       Past Surgical History:   Procedure Laterality Date    APPENDECTOMY      CARDIAC CATHETERIZATION      CARDIAC CATHETERIZATION N/A 01/11/2024    Procedure: Coronary angiography;  Surgeon: Tato Lorenzana MD;  Location:  [facility];  Service: Cardiology;  Laterality: N/A;    CARDIAC CATHETERIZATION N/A 01/18/2024    Procedure: Chronic Total Occlussion;  Surgeon: Tato Lorenzana MD;  Location:  [facility];  Service: Cardiology;  Laterality: N/A;    COLONOSCOPY  09/11/2012    Tics repeat exam in 10 years    COLONOSCOPY N/A 12/01/2022    Dr. Macias-normal exam    CORONARY ARTERY BYPASS GRAFT      3 bypasses    ENDOSCOPY N/A 12/01/2022    Dr. macias-Fragment of benign gastric mucosa demonstrating chronic active inflammation, moderate. B. No intestinal metaplasia identified. C. No dysplasia identified    ENDOSCOPY N/A 06/27/2023    Dr. macias-Chronic active esophagogastritis, moderate. B. No intestinal metaplasia identified. C. No dysplasia identified    TRIGGER FINGER RELEASE Left 03/07/2023    Procedure: LEFT MIDDLE TRIGGER FINGER RELEASE;  Surgeon: Warren Houser MD;  Location:  [facility] OR;  Service: Orthopedics;  Laterality: Left;       Social History     Socioeconomic History    Marital status:    Tobacco Use    Smoking status: Never     Passive exposure: Past    Smokeless tobacco: Never   Vaping Use    Vaping status: Never Used   Substance and  "Sexual Activity    Alcohol use: Yes     Alcohol/week: 1.0 standard drink of alcohol     Types: 1 Cans of beer per week     Comment: occasionally    Drug use: Never    Sexual activity: Not Currently     Partners: Female       Family History   Problem Relation Age of Onset    Heart disease Mother     Stroke Mother     Prostate cancer Father     Heart disease Father     Coronary artery disease Father     Heart attack Father     Heart attack Brother     Heart disease Brother     Sudden death Brother     No Known Problems Brother     No Known Problems Maternal Grandmother     No Known Problems Maternal Grandfather     Heart disease Paternal Grandmother     No Known Problems Paternal Grandfather     Colon cancer Neg Hx     Colon polyps Neg Hx        Objective    Temp 97.4 °F (36.3 °C)   Ht 175.3 cm (69\")   Wt 93.6 kg (206 lb 6.4 oz)   BMI 30.48 kg/m²     Physical Exam        Results for orders placed or performed in visit on 11/20/24   POC Urinalysis Dipstick, Multipro    Collection Time: 11/20/24  8:30 AM    Specimen: Urine   Result Value Ref Range    Color Yellow Yellow, Straw, Dark Yellow, [place]    Clarity, UA Clear Clear    Glucose, UA Negative Negative mg/dL    Bilirubin Negative Negative    Ketones, UA Negative Negative    Specific Gravity  1.015 1.005 - 1.030    Blood, UA Negative Negative    pH, Urine 7.0 5.0 - 8.0    Protein, POC Negative Negative mg/dL    Urobilinogen, UA Normal Normal, 0.2 E.U./dL    Nitrite, UA Negative Negative    Leukocytes Large (3+) (A) Negative     Assessment and Plan    Diagnoses and all orders for this visit:    1. History of kidney stones (Primary)  -     POC Urinalysis Dipstick, Multipro    2. Benign prostatic hyperplasia with urinary obstruction  -     tamsulosin (FLOMAX) 0.4 MG capsule 24 hr capsule; Take 1 capsule by mouth Every Night.  Dispense: 90 capsule; Refill: 3    3. Benign prostatic hyperplasia with lower urinary tract symptoms, symptom details unspecified  -     " finasteride (PROSCAR) 5 MG tablet; Take 1 tablet by mouth Daily.  Dispense: 90 tablet; Refill: 3      Normal PSA.  No evidence for kidney stone on KUB.  LUTS well-controlled on combination therapy.  Continue finasteride and tamsulosin.  Follow-up in 1 year or sooner as needed.  No KUB needed next year      This document has been signed by DASHAWN Velazquez MD on November 20, 2024 12:17 CST

## 2024-11-19 ENCOUNTER — TELEPHONE (OUTPATIENT)
Dept: UROLOGY | Facility: CLINIC | Age: 69
End: 2024-11-19
Payer: MEDICARE

## 2024-11-19 NOTE — TELEPHONE ENCOUNTER
Called Patient to remind them to get KUB prior to appointment.  SPOKE with Patient.  If patient calls back it is ok for the HUB to tell the pt the message.

## 2024-11-20 ENCOUNTER — HOSPITAL ENCOUNTER (OUTPATIENT)
Dept: GENERAL RADIOLOGY | Facility: HOSPITAL | Age: 69
Discharge: HOME | End: 2024-11-20
Admitting: PHYSICIAN ASSISTANT
Payer: MEDICARE

## 2024-11-20 ENCOUNTER — OFFICE VISIT (OUTPATIENT)
Dept: UROLOGY | Facility: CLINIC | Age: 69
End: 2024-11-20
Payer: MEDICARE

## 2024-11-20 VITALS — HEIGHT: 69 IN | WEIGHT: 206.4 LBS | TEMPERATURE: 97.4 F | BODY MASS INDEX: 30.57 KG/M2

## 2024-11-20 DIAGNOSIS — Z87.442 HISTORY OF KIDNEY STONES: ICD-10-CM

## 2024-11-20 DIAGNOSIS — Z87.442 HISTORY OF KIDNEY STONES: Primary | ICD-10-CM

## 2024-11-20 DIAGNOSIS — N13.8 BENIGN PROSTATIC HYPERPLASIA WITH URINARY OBSTRUCTION: ICD-10-CM

## 2024-11-20 DIAGNOSIS — N40.1 BENIGN PROSTATIC HYPERPLASIA WITH URINARY OBSTRUCTION: ICD-10-CM

## 2024-11-20 DIAGNOSIS — N40.1 BENIGN PROSTATIC HYPERPLASIA WITH LOWER URINARY TRACT SYMPTOMS, SYMPTOM DETAILS UNSPECIFIED: ICD-10-CM

## 2024-11-20 LAB
BILIRUB BLD-MCNC: NEGATIVE MG/DL
CLARITY, POC: CLEAR
COLOR UR: YELLOW
GLUCOSE UR STRIP-MCNC: NEGATIVE MG/DL
KETONES UR QL: NEGATIVE
LEUKOCYTE EST, POC: ABNORMAL
NITRITE UR-MCNC: NEGATIVE MG/ML
PH UR: 7 [PH] (ref 5–8)
PROT UR STRIP-MCNC: NEGATIVE MG/DL
RBC # UR STRIP: NEGATIVE /UL
SP GR UR: 1.01 (ref 1–1.03)
UROBILINOGEN UR QL: NORMAL

## 2024-11-20 PROCEDURE — 74018 RADEX ABDOMEN 1 VIEW: CPT

## 2024-11-20 RX ORDER — FINASTERIDE 5 MG/1
5 TABLET, FILM COATED ORAL DAILY
Qty: 90 TABLET | Refills: 3 | Status: SHIPPED | OUTPATIENT
Start: 2024-11-20

## 2024-11-20 RX ORDER — TAMSULOSIN HYDROCHLORIDE 0.4 MG/1
1 CAPSULE ORAL NIGHTLY
Qty: 90 CAPSULE | Refills: 3 | Status: SHIPPED | OUTPATIENT
Start: 2024-11-20

## 2025-01-06 DIAGNOSIS — I10 ESSENTIAL HYPERTENSION: ICD-10-CM

## 2025-01-06 RX ORDER — ATENOLOL 25 MG/1
25 TABLET ORAL DAILY
Qty: 90 TABLET | Refills: 1 | Status: SHIPPED | OUTPATIENT
Start: 2025-01-06

## 2025-01-06 RX ORDER — LOSARTAN POTASSIUM 25 MG/1
TABLET ORAL
Qty: 90 TABLET | Refills: 1 | Status: SHIPPED | OUTPATIENT
Start: 2025-01-06

## 2025-01-25 ENCOUNTER — APPOINTMENT (OUTPATIENT)
Dept: GENERAL RADIOLOGY | Facility: HOSPITAL | Age: 70
End: 2025-01-25
Payer: MEDICARE

## 2025-01-25 ENCOUNTER — HOSPITAL ENCOUNTER (EMERGENCY)
Facility: HOSPITAL | Age: 70
Discharge: HOME | End: 2025-01-25
Attending: EMERGENCY MEDICINE
Payer: MEDICARE

## 2025-01-25 VITALS
TEMPERATURE: 99.1 F | SYSTOLIC BLOOD PRESSURE: 125 MMHG | BODY MASS INDEX: 32.58 KG/M2 | DIASTOLIC BLOOD PRESSURE: 62 MMHG | OXYGEN SATURATION: 100 % | RESPIRATION RATE: 18 BRPM | HEIGHT: 69 IN | WEIGHT: 220 LBS | HEART RATE: 71 BPM

## 2025-01-25 DIAGNOSIS — J10.1 INFLUENZA A: Primary | ICD-10-CM

## 2025-01-25 LAB
B PARAPERT DNA SPEC QL NAA+PROBE: NOT DETECTED
B PERT DNA SPEC QL NAA+PROBE: NOT DETECTED
C PNEUM DNA NPH QL NAA+NON-PROBE: NOT DETECTED
FLUAV H1 2009 PAND RNA NPH QL NAA+PROBE: DETECTED
FLUBV RNA ISLT QL NAA+PROBE: NOT DETECTED
HADV DNA SPEC NAA+PROBE: NOT DETECTED
HCOV 229E RNA SPEC QL NAA+PROBE: NOT DETECTED
HCOV HKU1 RNA SPEC QL NAA+PROBE: NOT DETECTED
HCOV NL63 RNA SPEC QL NAA+PROBE: NOT DETECTED
HCOV OC43 RNA SPEC QL NAA+PROBE: NOT DETECTED
HMPV RNA NPH QL NAA+NON-PROBE: NOT DETECTED
HPIV1 RNA ISLT QL NAA+PROBE: NOT DETECTED
HPIV2 RNA SPEC QL NAA+PROBE: NOT DETECTED
HPIV3 RNA NPH QL NAA+PROBE: NOT DETECTED
HPIV4 P GENE NPH QL NAA+PROBE: NOT DETECTED
M PNEUMO IGG SER IA-ACNC: NOT DETECTED
RHINOVIRUS RNA SPEC NAA+PROBE: NOT DETECTED
RSV RNA NPH QL NAA+NON-PROBE: NOT DETECTED
SARS-COV-2 RNA RESP QL NAA+PROBE: NOT DETECTED

## 2025-01-25 PROCEDURE — 0202U NFCT DS 22 TRGT SARS-COV-2: CPT | Performed by: EMERGENCY MEDICINE

## 2025-01-25 PROCEDURE — 99283 EMERGENCY DEPT VISIT LOW MDM: CPT

## 2025-01-25 PROCEDURE — 71046 X-RAY EXAM CHEST 2 VIEWS: CPT

## 2025-01-25 RX ORDER — HYDROCODONE BITARTRATE AND HOMATROPINE METHYLBROMIDE ORAL SOLUTION 5; 1.5 MG/5ML; MG/5ML
5 LIQUID ORAL ONCE
Status: COMPLETED | OUTPATIENT
Start: 2025-01-25 | End: 2025-01-25

## 2025-01-25 RX ORDER — OSELTAMIVIR PHOSPHATE 75 MG/1
75 CAPSULE ORAL 2 TIMES DAILY
Qty: 10 CAPSULE | Refills: 0 | Status: SHIPPED | OUTPATIENT
Start: 2025-01-25

## 2025-01-25 RX ORDER — HYDROCODONE BITARTRATE AND HOMATROPINE METHYLBROMIDE ORAL SOLUTION 5; 1.5 MG/5ML; MG/5ML
5 LIQUID ORAL EVERY 4 HOURS PRN
Qty: 120 ML | Refills: 0 | Status: SHIPPED | OUTPATIENT
Start: 2025-01-25 | End: 2025-01-25

## 2025-01-25 RX ORDER — HYDROCODONE BITARTRATE AND HOMATROPINE METHYLBROMIDE ORAL SOLUTION 5; 1.5 MG/5ML; MG/5ML
5 LIQUID ORAL EVERY 4 HOURS PRN
Qty: 120 ML | Refills: 0 | Status: SHIPPED | OUTPATIENT
Start: 2025-01-25

## 2025-01-25 RX ADMIN — HYDROCODONE BITARTRATE AND HOMATROPINE METHYLBROMIDE ORAL SOLUTION 5 ML: 5; 1.5 LIQUID ORAL at 14:52

## 2025-01-25 NOTE — ED PROVIDER NOTES
Subjective   History of Present Illness  Patient presents with a plaint of cough and congestion has been going on for since yesterday.  At least the cough is basically 1 day.  He has had some congestion for 2 to 3 days but started coughing yesterday and cannot seem to stop.  He is not coughing anything up with it.  He is not running any fever or chills.  He just cannot seem to stick coughing and get some rest.  He has had some nosebleeds that have been 3 or 4 over the past day.  He has a history of nosebleeds because he is on Eliquis but these a little more frequent they have been in the past.  He is not actively bleeding right now though.    History provided by:  Patient   used: No    Cough  Cough characteristics:  Non-productive  Sputum characteristics:  Nondescript  Severity:  Severe  Onset quality:  Gradual  Duration:  1 day  Timing:  Intermittent  Progression:  Worsening  Chronicity:  New  Smoker: no    Context: upper respiratory infection    Context: not animal exposure, not exposure to allergens, not fumes, not occupational exposure, not sick contacts, not smoke exposure, not weather changes and not with activity    Relieved by:  Nothing  Worsened by:  Nothing  Ineffective treatments:  None tried  Associated symptoms: rhinorrhea and sinus congestion    Associated symptoms: no chest pain, no chills, no diaphoresis, no ear fullness, no ear pain, no eye discharge, no fever, no headaches, no myalgias, no rash, no shortness of breath, no sore throat, no weight loss and no wheezing    Risk factors: no chemical exposure, no recent infection and no recent travel        Review of Systems   Constitutional: Negative.  Negative for chills, diaphoresis, fever and weight loss.   HENT:  Positive for nosebleeds and rhinorrhea. Negative for ear pain and sore throat.    Eyes:  Negative for discharge.   Respiratory:  Positive for cough. Negative for shortness of breath and wheezing.    Cardiovascular:  Negative.  Negative for chest pain.   Gastrointestinal: Negative.    Genitourinary: Negative.    Musculoskeletal: Negative.  Negative for myalgias.   Skin: Negative.  Negative for rash.   Neurological: Negative.  Negative for headaches.   Psychiatric/Behavioral: Negative.     All other systems reviewed and are negative.      Past Medical History:   Diagnosis Date    Acid reflux     Arthritis     Benign prostatic hyperplasia with lower urinary tract symptoms 12/06/2017    Coronary artery disease     COVID-19     Elevated PSA 12/06/2016    Heart murmur     Hyperlipidemia     Hypertension     New onset atrial fibrillation 08/31/2019    Sleep apnea     does not wear machine       Allergies   Allergen Reactions    Ramipril Angioedema     Lips swell, hives       Past Surgical History:   Procedure Laterality Date    APPENDECTOMY      CARDIAC CATHETERIZATION      CARDIAC CATHETERIZATION N/A 01/11/2024    Procedure: Coronary angiography;  Surgeon: Tato Lorenzana MD;  Location:  [facility];  Service: Cardiology;  Laterality: N/A;    CARDIAC CATHETERIZATION N/A 01/18/2024    Procedure: Chronic Total Occlussion;  Surgeon: Tato Lorenzana MD;  Location:  [facility];  Service: Cardiology;  Laterality: N/A;    COLONOSCOPY  09/11/2012    Tics repeat exam in 10 years    COLONOSCOPY N/A 12/01/2022    Dr. Macias-normal exam    CORONARY ARTERY BYPASS GRAFT      3 bypasses    ENDOSCOPY N/A 12/01/2022    Dr. macias-Fragment of benign gastric mucosa demonstrating chronic active inflammation, moderate. B. No intestinal metaplasia identified. C. No dysplasia identified    ENDOSCOPY N/A 06/27/2023    Dr. macias-Chronic active esophagogastritis, moderate. B. No intestinal metaplasia identified. C. No dysplasia identified    TRIGGER FINGER RELEASE Left 03/07/2023    Procedure: LEFT MIDDLE TRIGGER FINGER RELEASE;  Surgeon: Warren Houser MD;  Location:  [facility] OR;  Service: Orthopedics;  Laterality:  Left;       Family History   Problem Relation Age of Onset    Heart disease Mother     Stroke Mother     Prostate cancer Father     Heart disease Father     Coronary artery disease Father     Heart attack Father     Heart attack Brother     Heart disease Brother     Sudden death Brother     No Known Problems Brother     No Known Problems Maternal Grandmother     No Known Problems Maternal Grandfather     Heart disease Paternal Grandmother     No Known Problems Paternal Grandfather     Colon cancer Neg Hx     Colon polyps Neg Hx        Social History     Socioeconomic History    Marital status:    Tobacco Use    Smoking status: Never     Passive exposure: Past    Smokeless tobacco: Never   Vaping Use    Vaping status: Never Used   Substance and Sexual Activity    Alcohol use: Yes     Alcohol/week: 1.0 standard drink of alcohol     Types: 1 Cans of beer per week     Comment: occasionally    Drug use: Never    Sexual activity: Not Currently     Partners: Female       Prior to Admission medications    Medication Sig Start Date End Date Taking? Authorizing Provider   apixaban (ELIQUIS) 5 MG tablet tablet Take 1 tablet by mouth 2 (Two) Times a Day.    Nadeem Monroy MD   atenolol (TENORMIN) 25 MG tablet Take 1 tablet by mouth Every Night.    Nadeem Monroy MD   atorvastatin (LIPITOR) 40 MG tablet Take 1 tablet by mouth Every Night. 1/28/24   Tato Lorenzana MD   finasteride (PROSCAR) 5 MG tablet Take 1 tablet by mouth Daily. 11/20/24   Floyd Velazquez MD   HYDROcodone Bit-Homatrop MBr (HYCODAN) 5-1.5 MG/5ML solution Take 5 mL by mouth Every 4 (Four) Hours As Needed for Cough. 1/25/25   Jakub Muhammad Jr., MD   losartan (COZAAR) 25 MG tablet Take 1 tablet by mouth Every Night.    Nadeem Monroy MD   Omega-3 Fatty Acids (fish oil) 1000 MG capsule capsule Take 1 capsule by mouth Every Night.    Nadeem Monroy MD   omeprazole (priLOSEC) 20 MG capsule Take 1 capsule by mouth 2 (Two)  Times a Day. 10/28/24   Cheryl Call APRN   oseltamivir (TAMIFLU) 75 MG capsule Take 1 capsule by mouth 2 (Two) Times a Day. 1/25/25   Jakub Muhammad Jr., MD   PATIENT SUPPLIED MEDICATION Take 2 tablets by mouth Every Night. 4Life Transfer Factor Plus vitamin with Zinc    Provider, MD Nadeem   tamsulosin (FLOMAX) 0.4 MG capsule 24 hr capsule Take 1 capsule by mouth Every Night. 11/20/24   Floyd Velazquez MD   ticagrelor (BRILINTA) 90 MG tablet tablet Take 1 tablet by mouth 2 (Two) Times a Day. 1/6/25   Devin Alexis MD       Medications   HYDROcodone Bit-Homatrop MBr (HYCODAN) 5-1.5 MG/5ML solution 5 mL (5 mL Oral Given 1/25/25 1452)       Vitals:    01/25/25 1501   BP: 125/62   Pulse: 71   Resp:    Temp:    SpO2:          Objective   Physical Exam  Vitals and nursing note reviewed.   Constitutional:       Appearance: Normal appearance.   HENT:      Head: Normocephalic and atraumatic.      Nose: Congestion present.   Eyes:      Extraocular Movements: Extraocular movements intact.      Pupils: Pupils are equal, round, and reactive to light.   Cardiovascular:      Rate and Rhythm: Normal rate and regular rhythm.   Pulmonary:      Effort: Pulmonary effort is normal.      Breath sounds: Normal breath sounds.   Musculoskeletal:         General: Normal range of motion.      Cervical back: Normal range of motion and neck supple.   Skin:     General: Skin is warm and dry.   Neurological:      General: No focal deficit present.      Mental Status: He is alert and oriented to person, place, and time.   Psychiatric:         Mood and Affect: Mood normal.         Behavior: Behavior normal.         Procedures         Lab Results (last 24 hours)       Procedure Component Value Units Date/Time    Respiratory Panel PCR w/COVID-19(SARS-CoV-2) [facility] In-House, NP Swab in UTM/VTM, 2 HR TAT - Swab, Nasopharynx [[id number]]  (Abnormal) Collected: 01/25/25 1346    Specimen: Swab from  Nasopharynx Updated: 01/25/25 1453     ADENOVIRUS, PCR Not Detected     Coronavirus 229E Not Detected     Coronavirus HKU1 Not Detected     Coronavirus NL63 Not Detected     Coronavirus OC43 Not Detected     COVID19 Not Detected     Human Metapneumovirus Not Detected     Human Rhinovirus/Enterovirus Not Detected     Influenza A H1 2009 PCR Detected     Influenza B PCR Not Detected     Parainfluenza Virus 1 Not Detected     Parainfluenza Virus 2 Not Detected     Parainfluenza Virus 3 Not Detected     Parainfluenza Virus 4 Not Detected     RSV, PCR Not Detected     Bordetella pertussis pcr Not Detected     Bordetella parapertussis PCR Not Detected     Chlamydophila pneumoniae PCR Not Detected     Mycoplasma pneumo by PCR Not Detected    Narrative:      In the setting of a positive respiratory panel with a viral infection PLUS a negative procalcitonin without other underlying concern for bacterial infection, consider observing off antibiotics or discontinuation of antibiotics and continue supportive care. If the respiratory panel is positive for atypical bacterial infection (Bordetella pertussis, Chlamydophila pneumoniae, or Mycoplasma pneumoniae), consider antibiotic de-escalation to target atypical bacterial infection.            XR Chest 2 View   Final Result   1. Vascular and interstitial prominence may represent mild edema.       This report was signed and finalized on 1/25/2025 2:08 PM by Alberto Kendrick.              ED Course          MDM  Number of Diagnoses or Management Options  Influenza A: new and requires workup  Diagnosis management comments: I told the patient his chest x-ray does not show pneumonia but his respiratory panel is positive for influenza A and that seems to be the biggest problem.  We did prescribe Tamiflu for him.  I also prescribed some Hycodan cough syrup in order to get him some rest.  He is discharged in stable condition.       Amount and/or Complexity of Data Reviewed  Clinical lab  tests: ordered and reviewed  Tests in the radiology section of CPT®: ordered and reviewed    Risk of Complications, Morbidity, and/or Mortality  Presenting problems: moderate  Diagnostic procedures: moderate  Management options: moderate    Patient Progress  Patient progress: stable        Final diagnoses:   Influenza A          Jakub Muhammad Jr., MD  01/25/25 150

## 2025-01-26 ENCOUNTER — HOSPITAL ENCOUNTER (EMERGENCY)
Facility: HOSPITAL | Age: 70
Discharge: HOME | End: 2025-01-26
Attending: FAMILY MEDICINE | Admitting: FAMILY MEDICINE
Payer: MEDICARE

## 2025-01-26 ENCOUNTER — APPOINTMENT (OUTPATIENT)
Dept: CT IMAGING | Facility: HOSPITAL | Age: 70
End: 2025-01-26
Payer: MEDICARE

## 2025-01-26 ENCOUNTER — APPOINTMENT (OUTPATIENT)
Dept: GENERAL RADIOLOGY | Facility: HOSPITAL | Age: 70
End: 2025-01-26
Payer: MEDICARE

## 2025-01-26 VITALS
SYSTOLIC BLOOD PRESSURE: 128 MMHG | DIASTOLIC BLOOD PRESSURE: 69 MMHG | TEMPERATURE: 98.3 F | HEART RATE: 52 BPM | WEIGHT: 220.02 LBS | OXYGEN SATURATION: 96 % | BODY MASS INDEX: 32.59 KG/M2 | RESPIRATION RATE: 14 BRPM | HEIGHT: 69 IN

## 2025-01-26 DIAGNOSIS — K52.9 GASTROENTERITIS: Primary | ICD-10-CM

## 2025-01-26 DIAGNOSIS — N39.0 URINARY TRACT INFECTION WITHOUT HEMATURIA, SITE UNSPECIFIED: ICD-10-CM

## 2025-01-26 LAB
ALBUMIN SERPL-MCNC: 4.3 G/DL (ref 3.5–5.2)
ALBUMIN/GLOB SERPL: 1.7 G/DL
ALP SERPL-CCNC: 48 U/L (ref 39–117)
ALT SERPL W P-5'-P-CCNC: 25 U/L (ref 1–41)
AMORPH URATE CRY URNS QL MICRO: ABNORMAL /HPF
ANION GAP SERPL CALCULATED.3IONS-SCNC: 12 MMOL/L (ref 5–15)
APTT PPP: 33.5 SECONDS (ref 24.5–36)
AST SERPL-CCNC: 41 U/L (ref 1–40)
BACTERIA UR QL AUTO: ABNORMAL /HPF
BASOPHILS # BLD AUTO: 0.01 10*3/MM3 (ref 0–0.2)
BASOPHILS NFR BLD AUTO: 0.2 % (ref 0–1.5)
BILIRUB SERPL-MCNC: 0.5 MG/DL (ref 0–1.2)
BILIRUB UR QL STRIP: NEGATIVE
BUN SERPL-MCNC: 19 MG/DL (ref 8–23)
BUN/CREAT SERPL: 18.8 (ref 7–25)
CALCIUM SPEC-SCNC: 9.1 MG/DL (ref 8.6–10.5)
CHLORIDE SERPL-SCNC: 99 MMOL/L (ref 98–107)
CLARITY UR: ABNORMAL
CO2 SERPL-SCNC: 23 MMOL/L (ref 22–29)
COLOR UR: ABNORMAL
CREAT SERPL-MCNC: 1.01 MG/DL (ref 0.76–1.27)
D-LACTATE SERPL-SCNC: 2 MMOL/L (ref 0.5–2)
D-LACTATE SERPL-SCNC: 2.6 MMOL/L (ref 0.5–2)
DEPRECATED RDW RBC AUTO: 43.8 FL (ref 37–54)
EGFRCR SERPLBLD CKD-EPI 2021: 79.5 ML/MIN/1.73
EOSINOPHIL # BLD AUTO: 0.04 10*3/MM3 (ref 0–0.4)
EOSINOPHIL NFR BLD AUTO: 0.7 % (ref 0.3–6.2)
ERYTHROCYTE [DISTWIDTH] IN BLOOD BY AUTOMATED COUNT: 13.5 % (ref 12.3–15.4)
GEN 5 1HR TROPONIN T REFLEX: 37 NG/L
GLOBULIN UR ELPH-MCNC: 2.6 GM/DL
GLUCOSE SERPL-MCNC: 104 MG/DL (ref 65–99)
GLUCOSE UR STRIP-MCNC: NEGATIVE MG/DL
HCT VFR BLD AUTO: 38.3 % (ref 37.5–51)
HGB BLD-MCNC: 12.4 G/DL (ref 13–17.7)
HGB UR QL STRIP.AUTO: NEGATIVE
HYALINE CASTS UR QL AUTO: ABNORMAL /LPF
IMM GRANULOCYTES # BLD AUTO: 0.02 10*3/MM3 (ref 0–0.05)
IMM GRANULOCYTES NFR BLD AUTO: 0.3 % (ref 0–0.5)
INR PPP: 1.21 (ref 0.91–1.09)
KETONES UR QL STRIP: ABNORMAL
LEUKOCYTE ESTERASE UR QL STRIP.AUTO: ABNORMAL
LIPASE SERPL-CCNC: 12 U/L (ref 13–60)
LYMPHOCYTES # BLD AUTO: 0.76 10*3/MM3 (ref 0.7–3.1)
LYMPHOCYTES NFR BLD AUTO: 12.8 % (ref 19.6–45.3)
MCH RBC QN AUTO: 28.4 PG (ref 26.6–33)
MCHC RBC AUTO-ENTMCNC: 32.4 G/DL (ref 31.5–35.7)
MCV RBC AUTO: 87.8 FL (ref 79–97)
MONOCYTES # BLD AUTO: 0.77 10*3/MM3 (ref 0.1–0.9)
MONOCYTES NFR BLD AUTO: 12.9 % (ref 5–12)
NEUTROPHILS NFR BLD AUTO: 4.36 10*3/MM3 (ref 1.7–7)
NEUTROPHILS NFR BLD AUTO: 73.1 % (ref 42.7–76)
NITRITE UR QL STRIP: NEGATIVE
NRBC BLD AUTO-RTO: 0 /100 WBC (ref 0–0.2)
PH UR STRIP.AUTO: 7 [PH] (ref 5–8)
PLATELET # BLD AUTO: 172 10*3/MM3 (ref 140–450)
PMV BLD AUTO: 11 FL (ref 6–12)
POTASSIUM SERPL-SCNC: 4.2 MMOL/L (ref 3.5–5.2)
PROT SERPL-MCNC: 6.9 G/DL (ref 6–8.5)
PROT UR QL STRIP: ABNORMAL
PROTHROMBIN TIME: 15.9 SECONDS (ref 11.8–14.8)
RBC # BLD AUTO: 4.36 10*6/MM3 (ref 4.14–5.8)
RBC # UR STRIP: ABNORMAL /HPF
REF LAB TEST METHOD: ABNORMAL
SODIUM SERPL-SCNC: 134 MMOL/L (ref 136–145)
SP GR UR STRIP: 1.02 (ref 1–1.03)
SQUAMOUS #/AREA URNS HPF: ABNORMAL /HPF
TROPONIN T % DELTA: 3
TROPONIN T NUMERIC DELTA: 1 NG/L
TROPONIN T SERPL HS-MCNC: 36 NG/L
UROBILINOGEN UR QL STRIP: ABNORMAL
WBC # UR STRIP: ABNORMAL /HPF
WBC NRBC COR # BLD AUTO: 5.96 10*3/MM3 (ref 3.4–10.8)
YEAST URNS QL MICRO: ABNORMAL /HPF

## 2025-01-26 PROCEDURE — 85730 THROMBOPLASTIN TIME PARTIAL: CPT | Performed by: FAMILY MEDICINE

## 2025-01-26 PROCEDURE — 83605 ASSAY OF LACTIC ACID: CPT | Performed by: FAMILY MEDICINE

## 2025-01-26 PROCEDURE — 96375 TX/PRO/DX INJ NEW DRUG ADDON: CPT

## 2025-01-26 PROCEDURE — 96365 THER/PROPH/DIAG IV INF INIT: CPT

## 2025-01-26 PROCEDURE — 99285 EMERGENCY DEPT VISIT HI MDM: CPT

## 2025-01-26 PROCEDURE — 93005 ELECTROCARDIOGRAM TRACING: CPT | Performed by: FAMILY MEDICINE

## 2025-01-26 PROCEDURE — 93010 ELECTROCARDIOGRAM REPORT: CPT | Performed by: INTERNAL MEDICINE

## 2025-01-26 PROCEDURE — 25010000002 CEFTRIAXONE PER 250 MG: Performed by: FAMILY MEDICINE

## 2025-01-26 PROCEDURE — 96376 TX/PRO/DX INJ SAME DRUG ADON: CPT

## 2025-01-26 PROCEDURE — 71045 X-RAY EXAM CHEST 1 VIEW: CPT

## 2025-01-26 PROCEDURE — 25810000003 LACTATED RINGERS SOLUTION: Performed by: FAMILY MEDICINE

## 2025-01-26 PROCEDURE — 85025 COMPLETE CBC W/AUTO DIFF WBC: CPT | Performed by: FAMILY MEDICINE

## 2025-01-26 PROCEDURE — 85610 PROTHROMBIN TIME: CPT | Performed by: FAMILY MEDICINE

## 2025-01-26 PROCEDURE — 25510000001 IOPAMIDOL 61 % SOLUTION: Performed by: FAMILY MEDICINE

## 2025-01-26 PROCEDURE — 74177 CT ABD & PELVIS W/CONTRAST: CPT

## 2025-01-26 PROCEDURE — 81001 URINALYSIS AUTO W/SCOPE: CPT | Performed by: FAMILY MEDICINE

## 2025-01-26 PROCEDURE — 36415 COLL VENOUS BLD VENIPUNCTURE: CPT

## 2025-01-26 PROCEDURE — 83690 ASSAY OF LIPASE: CPT | Performed by: FAMILY MEDICINE

## 2025-01-26 PROCEDURE — 84484 ASSAY OF TROPONIN QUANT: CPT | Performed by: FAMILY MEDICINE

## 2025-01-26 PROCEDURE — 80053 COMPREHEN METABOLIC PANEL: CPT | Performed by: FAMILY MEDICINE

## 2025-01-26 PROCEDURE — 25010000002 ONDANSETRON PER 1 MG: Performed by: FAMILY MEDICINE

## 2025-01-26 RX ORDER — ONDANSETRON 2 MG/ML
4 INJECTION INTRAMUSCULAR; INTRAVENOUS ONCE
Status: COMPLETED | OUTPATIENT
Start: 2025-01-26 | End: 2025-01-26

## 2025-01-26 RX ORDER — ONDANSETRON 4 MG/1
4 TABLET, ORALLY DISINTEGRATING ORAL EVERY 6 HOURS PRN
Qty: 20 TABLET | Refills: 0 | Status: SHIPPED | OUTPATIENT
Start: 2025-01-26

## 2025-01-26 RX ORDER — CEFDINIR 300 MG/1
300 CAPSULE ORAL 2 TIMES DAILY
Qty: 14 CAPSULE | Refills: 0 | Status: SHIPPED | OUTPATIENT
Start: 2025-01-26 | End: 2025-02-02

## 2025-01-26 RX ORDER — IOPAMIDOL 612 MG/ML
100 INJECTION, SOLUTION INTRAVASCULAR
Status: COMPLETED | OUTPATIENT
Start: 2025-01-26 | End: 2025-01-26

## 2025-01-26 RX ADMIN — SODIUM CHLORIDE, POTASSIUM CHLORIDE, SODIUM LACTATE AND CALCIUM CHLORIDE 1000 ML: 600; 310; 30; 20 INJECTION, SOLUTION INTRAVENOUS at 17:31

## 2025-01-26 RX ADMIN — ONDANSETRON 4 MG: 2 INJECTION INTRAMUSCULAR; INTRAVENOUS at 21:59

## 2025-01-26 RX ADMIN — CEFTRIAXONE SODIUM 2000 MG: 2 INJECTION, POWDER, FOR SOLUTION INTRAMUSCULAR; INTRAVENOUS at 21:59

## 2025-01-26 RX ADMIN — ONDANSETRON 4 MG: 2 INJECTION INTRAMUSCULAR; INTRAVENOUS at 18:40

## 2025-01-26 RX ADMIN — IOPAMIDOL 100 ML: 612 INJECTION, SOLUTION INTRAVENOUS at 18:10

## 2025-01-26 NOTE — ED PROVIDER NOTES
HPI:     Patient is a 69-year-old white male presents to the emergency room with nausea and vomiting he states he woke up this morning at 238 with vomiting.  He was here yesterday being evaluated and treated for flu a started Tamiflu.  He states that the cough syrup and Tamiflu seem to make him even worse and that he cannot keep anything down and that he still coughs very powerfully that is nonproductive.  He also states that he was coughing so much that he started having nosebleed and coughing up blood which he believes is probably from the nose bleeding.  No chest pain.  No diaphoresis.  No trauma.    REVIEW OF SYSTEMS  CONSTITUTIONAL: Positive for chills and fatigue   EYES:  No complaints of discharge   ENT: Positive for epistaxis CARDIOVASCULAR:  No complaints of chest pain, palpitations, or swelling  RESPIRATORY: Positive for cough nonproductive with some shortness of breath  GI: Positive for abdominal cramping with nausea and vomiting but no diarrhea MUSCULOSKELETAL:  No complaints of back pain  SKIN:  No complaints of rash  NEUROLOGIC:  No complaints of headache, focal weakness, or sensory changes  ENDOCRINE:  No complaints of polyuria or polydipsia  LYMPHATIC:  No complaints of swollen glands  GENITOURINARY: No complaints of urinary frequency or hematuria      PAST MEDICAL HISTORY  Past Medical History:   Diagnosis Date    Acid reflux     Arthritis     Benign prostatic hyperplasia with lower urinary tract symptoms 12/06/2017    Coronary artery disease     COVID-19     Elevated PSA 12/06/2016    Heart murmur     Hyperlipidemia     Hypertension     New onset atrial fibrillation 08/31/2019    Sleep apnea     does not wear machine       FAMILY HISTORY  Family History   Problem Relation Age of Onset    Heart disease Mother     Stroke Mother     Prostate cancer Father     Heart disease Father     Coronary artery disease Father     Heart attack Father     Heart attack Brother     Heart disease Brother     Sudden  death Brother     No Known Problems Brother     No Known Problems Maternal Grandmother     No Known Problems Maternal Grandfather     Heart disease Paternal Grandmother     No Known Problems Paternal Grandfather     Colon cancer Neg Hx     Colon polyps Neg Hx        SOCIAL HISTORY  Social History     Socioeconomic History    Marital status:    Tobacco Use    Smoking status: Never     Passive exposure: Past    Smokeless tobacco: Never   Vaping Use    Vaping status: Never Used   Substance and Sexual Activity    Alcohol use: Yes     Alcohol/week: 1.0 standard drink of alcohol     Types: 1 Cans of beer per week     Comment: occasionally    Drug use: Never    Sexual activity: Not Currently     Partners: Female       IMMUNIZATION HISTORY  Deferred to primary care physician.    SURGICAL HISTORY  Past Surgical History:   Procedure Laterality Date    APPENDECTOMY      CARDIAC CATHETERIZATION      CARDIAC CATHETERIZATION N/A 01/11/2024    Procedure: Coronary angiography;  Surgeon: Tato Lorenzana MD;  Location:  [facility] CATH INVASIVE LOCATION;  Service: Cardiology;  Laterality: N/A;    CARDIAC CATHETERIZATION N/A 01/18/2024    Procedure: Chronic Total Occlussion;  Surgeon: Tato Lorenzana MD;  Location: [facility] CATH INVASIVE LOCATION;  Service: Cardiology;  Laterality: N/A;    COLONOSCOPY  09/11/2012    Tics repeat exam in 10 years    COLONOSCOPY N/A 12/01/2022    Dr. Macias-normal exam    CORONARY ARTERY BYPASS GRAFT      3 bypasses    ENDOSCOPY N/A 12/01/2022    Dr. macias-Fragment of benign gastric mucosa demonstrating chronic active inflammation, moderate. B. No intestinal metaplasia identified. C. No dysplasia identified    ENDOSCOPY N/A 06/27/2023    Dr. macias-Chronic active esophagogastritis, moderate. B. No intestinal metaplasia identified. C. No dysplasia identified    TRIGGER FINGER RELEASE Left 03/07/2023    Procedure: LEFT MIDDLE TRIGGER FINGER RELEASE;  Surgeon: Warren Houser MD;  Location: [facility]  OR;  Service: Orthopedics;  Laterality: Left;       CURRENT MEDICATIONS    Current Facility-Administered Medications:     cefTRIAXone (ROCEPHIN) 2,000 mg in sodium chloride 0.9 % 100 mL MBP, 2,000 mg, Intravenous, Once, Jakub Garza Jr., MD    ondansetron (ZOFRAN) injection 4 mg, 4 mg, Intravenous, Once, Jakub Garza Jr., MD    Current Outpatient Medications:     apixaban (ELIQUIS) 5 MG tablet tablet, Take 1 tablet by mouth 2 (Two) Times a Day., Disp: , Rfl:     atenolol (TENORMIN) 25 MG tablet, Take 1 tablet by mouth Every Night., Disp: , Rfl:     atorvastatin (LIPITOR) 40 MG tablet, Take 1 tablet by mouth Every Night., Disp: 90 tablet, Rfl: 3    cefdinir (OMNICEF) 300 MG capsule, Take 1 capsule by mouth 2 (Two) Times a Day for 7 days., Disp: 14 capsule, Rfl: 0    finasteride (PROSCAR) 5 MG tablet, Take 1 tablet by mouth Daily., Disp: 90 tablet, Rfl: 3    HYDROcodone Bit-Homatrop MBr (HYCODAN) 5-1.5 MG/5ML solution, Take 5 mL by mouth Every 4 (Four) Hours As Needed for Cough., Disp: 120 mL, Rfl: 0    losartan (COZAAR) 25 MG tablet, Take 1 tablet by mouth Every Night., Disp: , Rfl:     Omega-3 Fatty Acids (fish oil) 1000 MG capsule capsule, Take 1 capsule by mouth Every Night., Disp: , Rfl:     omeprazole (priLOSEC) 20 MG capsule, Take 1 capsule by mouth 2 (Two) Times a Day., Disp: 60 capsule, Rfl: 10    ondansetron ODT (ZOFRAN-ODT) 4 MG disintegrating tablet, Place 1 tablet on the tongue Every 6 (Six) Hours As Needed for Nausea or Vomiting., Disp: 20 tablet, Rfl: 0    oseltamivir (TAMIFLU) 75 MG capsule, Take 1 capsule by mouth 2 (Two) Times a Day., Disp: 10 capsule, Rfl: 0    PATIENT SUPPLIED MEDICATION, Take 2 tablets by mouth Every Night. 4Life Transfer Factor Plus vitamin with Zinc, Disp: , Rfl:     tamsulosin (FLOMAX) 0.4 MG capsule 24 hr capsule, Take 1 capsule by mouth Every Night., Disp: 90 capsule, Rfl: 3    ticagrelor (BRILINTA) 90 MG tablet tablet, Take 1 tablet by mouth 2 (Two) Times a  "Day., Disp: 180 tablet, Rfl: 3    ALLERGIES  Allergies   Allergen Reactions    Ramipril Angioedema     Lips swell, hives       ABDOMINAL EXAM    VITAL SIGNS:   /60 (BP Location: Right arm, Patient Position: Sitting)   Pulse 106   Temp 98.4 °F (36.9 °C) (Oral)   Resp 18   Ht 175.3 cm (69.02\")   Wt 99.8 kg (220 lb 0.3 oz)   SpO2 94%   BMI 32.48 kg/m²     Constitutional: Patient is alert and in no distress.  Patient with moderate abdominal discomfort.    ENT: There is a normal pharynx with no acute erythema or exudate and oral mucosa is moist.  Nose is clear with no drainage.  Tympanic membranes intact and non-erythemic    Cardiovascular: S1-S2 regular rate and rhythm. No murmurs, rubs or gallops.  Pulses are equal bilaterally and there is no pitting edema.    Respiratory: Patient is clear to auscultation bilaterally with no wheezing or rhonchi.  Chest wall is nontender.  There are no external lesions on the chest.  There is no crepitance.    Gastrointestinal: Bowel sounds are normal in all 4 quadrants there is diffuse tenderness but no rebound or guarding.  No abdominal distention or fluid wave.    Genitourinary: Patient is voiding appropriately.    Integument: No acute lesions noted.  Color appears to be normal.    Meño Coma Scale: Total score 15    Neurological: Patient is alert and oriented x4 and no acute findings noted.  Speech is fluent and cognition is normal.  No evidence of acute CVA.  Cranial nerves II through XII intact.  Patient with normal motor function as well as reflexes and sensation    Psychiatric: Normal affect and mood.            RADIOLOGY/PROCEDURES        CT Abdomen Pelvis With Contrast   Final Result       1.  No acute findings in the abdomen or pelvis.       2.  Cardiomegaly and coronary calcifications.       3.  Hepatic steatosis.       4.  Mild fecal stasis. Colonic diverticulosis but no diverticulitis.       5.  Enlarged prostate gland indents the urinary bladder. Correlate " with   digital rectal exam and PSA.        This report was signed and finalized on 1/26/2025 6:28 PM by Dr. Albert Conti MD.          XR Chest 1 View   Final Result       1.  Chronic changes as discussed above. No definite acute   cardiopulmonary process.       2.  On the study from 1/25/2025, there were interstitial opacities which   have essentially resolved.               This report was signed and finalized on 1/26/2025 5:53 PM by Dr. Albert Conti MD.                 FUTURE APPOINTMENTS     Future Appointments   Date Time Provider Department Center   4/2/2025  2:15 PM Devin Alexis MD [facility]   10/15/2025  9:30 AM Indra Lepe MD [facility]  [facility]          COURSE & MEDICAL DECISION MAKING       Patient's partial differential diagnosis can include:     gastritis, gastroenteritis, colitis, enteritis, volvulus, intussusception, esophageal spasms, gastroparesis, GERD, peptic ulcer disease, perforated esophagus, perforated peptic ulcer, partial bowel obstruction, bowel obstruction,, AAA, mesenteric adenitis, mesenteric ischemia, constipation, irritable bowel, diverticulitis, diverticulosis, Crohn's disease, ulcerative colitis, celiac disease and others    Discussed with the patient the results of the laboratory radiological test he feels comfortable his nausea is resolved we will give Rocephin for his UTI and discharge him home with cefdinir and Zofran.    Patient's level of risk: Low       CRITICAL CARE    CRITICAL CARE: No  CRITICAL CARE TIME: None      Recent Results (from the past 24 hours)   ECG 12 Lead Dyspnea    Collection Time: 01/26/25  5:20 PM   Result Value Ref Range    QT Interval 428 ms    QTC Interval 428 ms   Comprehensive Metabolic Panel    Collection Time: 01/26/25  5:39 PM    Specimen: Arm, Left; Blood   Result Value Ref Range    Glucose 104 (H) 65 - 99 mg/dL    BUN 19 8 - 23 mg/dL    Creatinine 1.01 0.76 - 1.27 mg/dL    Sodium 134 (L) 136 - 145 mmol/L    Potassium  4.2 3.5 - 5.2 mmol/L    Chloride 99 98 - 107 mmol/L    CO2 23.0 22.0 - 29.0 mmol/L    Calcium 9.1 8.6 - 10.5 mg/dL    Total Protein 6.9 6.0 - 8.5 g/dL    Albumin 4.3 3.5 - 5.2 g/dL    ALT (SGPT) 25 1 - 41 U/L    AST (SGOT) 41 (H) 1 - 40 U/L    Alkaline Phosphatase 48 39 - 117 U/L    Total Bilirubin 0.5 0.0 - 1.2 mg/dL    Globulin 2.6 gm/dL    A/G Ratio 1.7 g/dL    BUN/Creatinine Ratio 18.8 7.0 - 25.0    Anion Gap 12.0 5.0 - 15.0 mmol/L    eGFR 79.5 >60.0 mL/min/1.73   Protime-INR    Collection Time: 01/26/25  5:39 PM    Specimen: Arm, Left; Blood   Result Value Ref Range    Protime 15.9 (H) 11.8 - 14.8 Seconds    INR 1.21 (H) 0.91 - 1.09   aPTT    Collection Time: 01/26/25  5:39 PM    Specimen: Arm, Left; Blood   Result Value Ref Range    PTT 33.5 24.5 - 36.0 seconds   Lipase    Collection Time: 01/26/25  5:39 PM    Specimen: Arm, Left; Blood   Result Value Ref Range    Lipase 12 (L) 13 - 60 U/L   High Sensitivity Troponin T    Collection Time: 01/26/25  5:39 PM    Specimen: Arm, Left; Blood   Result Value Ref Range    HS Troponin T 36 (H) <22 ng/L   Lactic Acid, Plasma    Collection Time: 01/26/25  5:39 PM    Specimen: Arm, Left; Blood   Result Value Ref Range    Lactate 2.6 (C) 0.5 - 2.0 mmol/L   CBC Auto Differential    Collection Time: 01/26/25  5:39 PM    Specimen: Arm, Left; Blood   Result Value Ref Range    WBC 5.96 3.40 - 10.80 10*3/mm3    RBC 4.36 4.14 - 5.80 10*6/mm3    Hemoglobin 12.4 (L) 13.0 - 17.7 g/dL    Hematocrit 38.3 37.5 - 51.0 %    MCV 87.8 79.0 - 97.0 fL    MCH 28.4 26.6 - 33.0 pg    MCHC 32.4 31.5 - 35.7 g/dL    RDW 13.5 12.3 - 15.4 %    RDW-SD 43.8 37.0 - 54.0 fl    MPV 11.0 6.0 - 12.0 fL    Platelets 172 140 - 450 10*3/mm3    Neutrophil % 73.1 42.7 - 76.0 %    Lymphocyte % 12.8 (L) 19.6 - 45.3 %    Monocyte % 12.9 (H) 5.0 - 12.0 %    Eosinophil % 0.7 0.3 - 6.2 %    Basophil % 0.2 0.0 - 1.5 %    Immature Grans % 0.3 0.0 - 0.5 %    Neutrophils, Absolute 4.36 1.70 - 7.00 10*3/mm3    Lymphocytes,  Absolute 0.76 0.70 - 3.10 10*3/mm3    Monocytes, Absolute 0.77 0.10 - 0.90 10*3/mm3    Eosinophils, Absolute 0.04 0.00 - 0.40 10*3/mm3    Basophils, Absolute 0.01 0.00 - 0.20 10*3/mm3    Immature Grans, Absolute 0.02 0.00 - 0.05 10*3/mm3    nRBC 0.0 0.0 - 0.2 /100 WBC   Urinalysis With Culture If Indicated - Urine, Clean Catch    Collection Time: 01/26/25  6:28 PM    Specimen: Urine, Clean Catch   Result Value Ref Range    Color, UA Dark Yellow (A) Yellow, Straw    Appearance, UA Cloudy (A) Clear    pH, UA 7.0 5.0 - 8.0    Specific Gravity, UA 1.025 1.005 - 1.030    Glucose, UA Negative Negative    Ketones, UA 15 mg/dL (1+) (A) Negative    Bilirubin, UA Negative Negative    Blood, UA Negative Negative    Protein, UA 30 mg/dL (1+) (A) Negative    Leuk Esterase, UA Trace (A) Negative    Nitrite, UA Negative Negative    Urobilinogen, UA 1.0 E.U./dL 0.2 - 1.0 E.U./dL   Urinalysis, Microscopic Only - Urine, Clean Catch    Collection Time: 01/26/25  6:28 PM    Specimen: Urine, Clean Catch   Result Value Ref Range    RBC, UA None Seen None Seen, 0-2 /HPF    WBC, UA 3-5 (A) None Seen, 0-2 /HPF    Bacteria, UA 2+ (A) None Seen /HPF    Squamous Epithelial Cells, UA 0-2 None Seen, 0-2 /HPF    Yeast, UA Small/1+ Yeast (A) None Seen /HPF    Hyaline Casts, UA None Seen None Seen /LPF    Amorphous Crystals, UA Small/1+ None Seen /HPF    Methodology Manual Light Microscopy    High Sensitivity Troponin T 1Hr    Collection Time: 01/26/25  6:39 PM    Specimen: Arm, Right; Blood   Result Value Ref Range    HS Troponin T 37 (H) <22 ng/L    Troponin T Numeric Delta 1 ng/L    Troponin T % Delta 3 Abnormal if >/= 20%   STAT Lactic Acid, Reflex    Collection Time: 01/26/25  8:49 PM    Specimen: Blood   Result Value Ref Range    Lactate 2.0 0.5 - 2.0 mmol/L          Old charts were reviewed per [facility] EMR.  Pertinent details are summarized above.  All laboratory, radiologic, and EKG studies that were performed in the Emergency Department were  a necessary part of the evaluation needed to exclude unstable or  emergent medical conditions.     Patient was hemodynamically and neurologically stable in the ED.   Pertinent studies were reviewed as above.     The patient received:  Medications   ondansetron (ZOFRAN) injection 4 mg (has no administration in time range)   cefTRIAXone (ROCEPHIN) 2,000 mg in sodium chloride 0.9 % 100 mL MBP (has no administration in time range)   lactated ringers bolus 1,000 mL (0 mL Intravenous Stopped 1/26/25 2003)   iopamidol (ISOVUE-300) 61 % injection 100 mL (100 mL Intravenous Given 1/26/25 1810)   ondansetron (ZOFRAN) injection 4 mg (4 mg Intravenous Given 1/26/25 1840)            Diagnoses that have been ruled out:   None   Diagnoses that are still under consideration:   None   Final diagnoses:   Gastroenteritis   Urinary tract infection without hematuria, site unspecified        FINAL IMPRESSION   Diagnosis Plan   1. Gastroenteritis        2. Urinary tract infection without hematuria, site unspecified              Jakub Garza Jr, MD        ED Disposition       ED Disposition   Discharge    Condition   Stable    Comment   --                 Dragon disclaimer:  Part of this note may be an electronic transcription/translation of spoken language to printed text using the Dragon Dictation System.     I have reviewed the patient’s prescription history via a prescription monitoring program.  This information is consistent with my knowledge of the patient’s controlled substance use history.        Jakub Garza Jr., MD  01/26/25 7899

## 2025-01-27 LAB
QT INTERVAL: 428 MS
QTC INTERVAL: 428 MS

## 2025-01-27 NOTE — PROGRESS NOTES
"Pharmacy Dosing Service  Antimicrobial Dosing  Rocephin    Assessment/Action/Plan:  Rocephin 1 gm IV once for the treatment of UTI has been adjusted to 2 gm IV once for patient with a BMI >= 30 kg/m2.        Subjective:  Pawel Thompson is a 69 y.o. male     Objective:  Ht: 175.3 cm (69.02\"); Wt: 99.8 kg (220 lb 0.3 oz); BMI: Body mass index is 32.48 kg/m².  Estimated Creatinine Clearance: 78.1 mL/min (by C-G formula based on SCr of 1.01 mg/dL).   Creatinine   Date Value Ref Range Status   01/26/2025 1.01 0.76 - 1.27 mg/dL Final      Lab Results   Component Value Date    WBC 5.96 01/26/2025      Baseline culture results:  Microbiology Results (last 10 days)       Procedure Component Value - Date/Time    Respiratory Panel PCR w/COVID-19(SARS-CoV-2) [facility] In-House, NP Swab in UTM/VTM, 2 HR TAT - Swab, Nasopharynx [[id number]]  (Abnormal) Collected: 01/25/25 1346    Lab Status: Final result Specimen: Swab from Nasopharynx Updated: 01/25/25 1453     ADENOVIRUS, PCR Not Detected     Coronavirus 229E Not Detected     Coronavirus HKU1 Not Detected     Coronavirus NL63 Not Detected     Coronavirus OC43 Not Detected     COVID19 Not Detected     Human Metapneumovirus Not Detected     Human Rhinovirus/Enterovirus Not Detected     Influenza A H1 2009 PCR Detected     Influenza B PCR Not Detected     Parainfluenza Virus 1 Not Detected     Parainfluenza Virus 2 Not Detected     Parainfluenza Virus 3 Not Detected     Parainfluenza Virus 4 Not Detected     RSV, PCR Not Detected     Bordetella pertussis pcr Not Detected     Bordetella parapertussis PCR Not Detected     Chlamydophila pneumoniae PCR Not Detected     Mycoplasma pneumo by PCR Not Detected    Narrative:      In the setting of a positive respiratory panel with a viral infection PLUS a negative procalcitonin without other underlying concern for bacterial infection, consider observing off antibiotics or discontinuation of antibiotics and continue " supportive care. If the respiratory panel is positive for atypical bacterial infection (Bordetella pertussis, Chlamydophila pneumoniae, or Mycoplasma pneumoniae), consider antibiotic de-escalation to target atypical bacterial infection.            Thor Go, PharmD  01/26/25 21:52 CST

## 2025-02-03 ENCOUNTER — TELEPHONE (OUTPATIENT)
Dept: CARDIOLOGY | Facility: CLINIC | Age: 70
End: 2025-02-03
Payer: MEDICARE

## 2025-02-03 NOTE — TELEPHONE ENCOUNTER
Caller: Pawel Thompson    Relationship: Self    Best call back number: [phone]    What is the best time to reach you: ANYTIME     Who are you requesting to speak with (clinical staff, provider,  specific staff member): DR HUERTA OR NURSE     What was the call regarding: PT STATES JAN OF 2024 HE HAD BYPASS SURGERY WITH DR CLIFFORD. PT WAS PRESCRIBED BRILINTA AND WAS TOLD TO STAY ON THIS FOR AROUND A YEAR. PT IS ASKING IF IT IS OKAY FOR HIM TO GO OFF OF THIS BRILINTA NOW. IT IS ABOUT TIME HE REFILLS THIS BUT SINCE HE HAS HIT THAT YEAR OLEG HE IS WANTING TO COME OFF THIS IF THAT IS OKAY WITH DR HUERTA.     Is it okay if the provider responds through MyChart: CALL BACK TO DISCUSS.

## 2025-02-03 NOTE — TELEPHONE ENCOUNTER
Caller: Pawel Thompson LAYNE    Relationship: Self    Best call back number: [phone]    Requested Prescriptions:   Requested Prescriptions     Pending Prescriptions Disp Refills    apixaban (ELIQUIS) 5 MG tablet tablet 60 tablet      Sig: Take 1 tablet by mouth 2 (Two) Times a Day.        Pharmacy where request should be sent: [facility] #[id number] - [place], [place] - [address] [phone] [facility] [phone]      Last office visit with prescribing clinician: Visit date not found   Last telemedicine visit with prescribing clinician: Visit date not found   Next office visit with prescribing clinician: 4/2/2025     Additional details provided by patient: PT NEXT FU IS NOT UNTIL APRIL AND HE HAS 2 DAYS LEFT OF MEDICATION.    Does the patient have less than a 3 day supply:  [x] Yes  [] No    Would you like a call back once the refill request has been completed: [] Yes [x] No    If the office needs to give you a call back, can they leave a voicemail: [] Yes [] No    Vick Ott Rep   02/03/25 10:17 CST

## 2025-03-04 RX ORDER — ATORVASTATIN CALCIUM 40 MG/1
40 TABLET, FILM COATED ORAL NIGHTLY
Qty: 90 TABLET | Refills: 3 | Status: SHIPPED | OUTPATIENT
Start: 2025-03-04 | End: 2025-03-05 | Stop reason: SDUPTHER

## 2025-03-05 RX ORDER — LOSARTAN POTASSIUM 25 MG/1
25 TABLET ORAL NIGHTLY
Qty: 90 TABLET | Refills: 3 | Status: SHIPPED | OUTPATIENT
Start: 2025-03-05

## 2025-03-05 RX ORDER — ATENOLOL 25 MG/1
25 TABLET ORAL NIGHTLY
Qty: 90 TABLET | Refills: 3 | Status: SHIPPED | OUTPATIENT
Start: 2025-03-05

## 2025-03-05 RX ORDER — ATORVASTATIN CALCIUM 40 MG/1
40 TABLET, FILM COATED ORAL NIGHTLY
Qty: 90 TABLET | Refills: 3 | Status: SHIPPED | OUTPATIENT
Start: 2025-03-05

## 2025-03-18 NOTE — PROGRESS NOTES
digit.  No appreciable EDC tendon instability.  Able to make a composite fist.  No appreciable flexion contracture noted.  No similar findings to adjacent digits.  No gross motor or sensory deficit to the hand.  Hand is warm and perfused.     Imaging  none.          Albina Hernández is a 69 y.o. male who presents today with a new problem of catching and locking of the right middle finger as well as the left ring finger that has gradually worsened over the course of several months and is causing some discomfort.  All possible treatment options were discussed with the patient.  At this time patient wishes to proceed with corticosteroid injection for both right middle trigger finger and left ring trigger finger.  Patient then wishes to follow-up on an as-needed basis.    Risks including, but not limited to, bleeding, infection and failure to alleviate any or all of the symptoms were discussed.  All questions were answered.    Skin overlying the right middle finger was cleansed with an alcohol swab.  A 23-gauge needle was used to inject 0.5 cc of 1% lidocaine and 3 mg of betamethasone into the A1 pulley/flexor tendon sheath.  Band-Aid was placed over the injection site.  Patient tolerated the injection well and was without complication.     Skin overlying the left ring finger was cleansed with an alcohol swab.  A 23-gauge needle was used to inject 0.5 cc of 1% lidocaine and 3 mg of betamethasone into the A1 pulley/flexor tendon sheath.  Band-Aid was placed over the injection site.  Patient tolerated the injection well and was without complication.       This dictation was generated by voice recognition computer software. Although all attempts are made to edit the dictation for accuracy, there may be errors in the transcription that are not intended.    Electronically signed by LANIE Henderson CNP on 3/18/2025 at 6:28 PM

## 2025-03-20 ENCOUNTER — OFFICE VISIT (OUTPATIENT)
Age: 70
End: 2025-03-20

## 2025-03-20 VITALS — HEIGHT: 69 IN | BODY MASS INDEX: 31.76 KG/M2 | WEIGHT: 214.4 LBS

## 2025-03-20 DIAGNOSIS — M65.331 TRIGGER FINGER, RIGHT MIDDLE FINGER: Primary | ICD-10-CM

## 2025-03-20 DIAGNOSIS — M65.342 TRIGGER FINGER, LEFT RING FINGER: ICD-10-CM

## 2025-03-20 RX ORDER — BETAMETHASONE SODIUM PHOSPHATE AND BETAMETHASONE ACETATE 3; 3 MG/ML; MG/ML
3 INJECTION, SUSPENSION INTRA-ARTICULAR; INTRALESIONAL; INTRAMUSCULAR; SOFT TISSUE ONCE
Status: COMPLETED | OUTPATIENT
Start: 2025-03-20 | End: 2025-03-20

## 2025-03-20 RX ORDER — LIDOCAINE HYDROCHLORIDE 10 MG/ML
0.5 INJECTION, SOLUTION EPIDURAL; INFILTRATION; INTRACAUDAL; PERINEURAL ONCE
Status: COMPLETED | OUTPATIENT
Start: 2025-03-20 | End: 2025-03-20

## 2025-03-20 RX ADMIN — BETAMETHASONE SODIUM PHOSPHATE AND BETAMETHASONE ACETATE 3 MG: 3; 3 INJECTION, SUSPENSION INTRA-ARTICULAR; INTRALESIONAL; INTRAMUSCULAR; SOFT TISSUE at 09:40

## 2025-03-20 RX ADMIN — LIDOCAINE HYDROCHLORIDE 0.5 ML: 10 INJECTION, SOLUTION EPIDURAL; INFILTRATION; INTRACAUDAL; PERINEURAL at 09:40

## 2025-03-20 RX ADMIN — BETAMETHASONE SODIUM PHOSPHATE AND BETAMETHASONE ACETATE 3 MG: 3; 3 INJECTION, SUSPENSION INTRA-ARTICULAR; INTRALESIONAL; INTRAMUSCULAR; SOFT TISSUE at 09:41

## 2025-04-02 ENCOUNTER — OFFICE VISIT (OUTPATIENT)
Dept: CARDIOLOGY | Facility: CLINIC | Age: 70
End: 2025-04-02
Payer: MEDICARE

## 2025-04-02 VITALS
DIASTOLIC BLOOD PRESSURE: 64 MMHG | SYSTOLIC BLOOD PRESSURE: 116 MMHG | BODY MASS INDEX: 31.55 KG/M2 | WEIGHT: 213 LBS | OXYGEN SATURATION: 99 % | HEIGHT: 69 IN | HEART RATE: 53 BPM

## 2025-04-02 DIAGNOSIS — E78.2 MIXED HYPERLIPIDEMIA: ICD-10-CM

## 2025-04-02 DIAGNOSIS — I48.0 PAF (PAROXYSMAL ATRIAL FIBRILLATION): ICD-10-CM

## 2025-04-02 DIAGNOSIS — I25.810 CORONARY ARTERY DISEASE INVOLVING CORONARY BYPASS GRAFT OF NATIVE HEART WITHOUT ANGINA PECTORIS: Primary | ICD-10-CM

## 2025-04-02 PROCEDURE — 1160F RVW MEDS BY RX/DR IN RCRD: CPT | Performed by: INTERNAL MEDICINE

## 2025-04-02 PROCEDURE — 93000 ELECTROCARDIOGRAM COMPLETE: CPT | Performed by: INTERNAL MEDICINE

## 2025-04-02 PROCEDURE — 1159F MED LIST DOCD IN RCRD: CPT | Performed by: INTERNAL MEDICINE

## 2025-04-02 PROCEDURE — 3074F SYST BP LT 130 MM HG: CPT | Performed by: INTERNAL MEDICINE

## 2025-04-02 PROCEDURE — 99214 OFFICE O/P EST MOD 30 MIN: CPT | Performed by: INTERNAL MEDICINE

## 2025-04-02 PROCEDURE — 3078F DIAST BP <80 MM HG: CPT | Performed by: INTERNAL MEDICINE

## 2025-04-02 NOTE — PROGRESS NOTES
Subjective:     Encounter Date:04/02/2025      Patient ID: Pawel Thompson is a 69 y.o. male with coronary artery disease, prior coronary artery bypass grafting, prior  procedure the right coronary artery by Dr. Lorenzana in January 2024, aortic root dissection during the  procedure (managed conservatively by Dr. Lepe) and no aortic aneurysm, paroxysmal atrial fibrillation, hypertension, hyperlipidemia, presenting today for follow-up.    Chief Complaint: Here for follow-up of CAD    History of Present Illness    This patient presents today for routine follow-up.  He does have coronary artery disease with remote history of coronary artery bypass grafting approximately 20 years ago.  More recently, he had a  procedure of the right coronary artery.  This was complicated by an aortic dissection the patient is also noted to have an aortic root and this is followed by Dr. Lepe.  The dissection was managed conservatively.  The patient has done well since that time.  He denies having chest pain, significant shortness of breath or dyspnea with exertion.  He does note some easy bruising but no major bleeding on anticoagulation.  He was previously on Brilinta as well but this has been discontinued.  Patient's blood pressure is generally well-controlled and his cholesterol has been well-controlled.  He denies having any side effects of his medications.  He denies having palpitations, lightheadedness, dizziness, syncope.  He also denies having significant lower extremity edema, orthopnea, PND.      Current Outpatient Medications:     apixaban (ELIQUIS) 5 MG tablet tablet, Take 1 tablet by mouth 2 (Two) Times a Day., Disp: 60 tablet, Rfl: 11    atenolol (TENORMIN) 25 MG tablet, Take 1 tablet by mouth Every Night., Disp: 90 tablet, Rfl: 3    atorvastatin (LIPITOR) 40 MG tablet, Take 1 tablet by mouth Every Night., Disp: 90 tablet, Rfl: 3    finasteride (PROSCAR) 5 MG tablet, Take 1 tablet by mouth Daily., Disp: 90 tablet,  Rfl: 3    losartan (COZAAR) 25 MG tablet, Take 1 tablet by mouth Every Night., Disp: 90 tablet, Rfl: 3    Omega-3 Fatty Acids (fish oil) 1000 MG capsule capsule, Take 1 capsule by mouth Every Night., Disp: , Rfl:     omeprazole (priLOSEC) 20 MG capsule, Take 1 capsule by mouth 2 (Two) Times a Day., Disp: 60 capsule, Rfl: 10    PATIENT SUPPLIED MEDICATION, Take 2 tablets by mouth Every Night. 4Life Transfer Factor Plus vitamin with Zinc, Disp: , Rfl:     tamsulosin (FLOMAX) 0.4 MG capsule 24 hr capsule, Take 1 capsule by mouth Every Night., Disp: 90 capsule, Rfl: 3    Allergies   Allergen Reactions    Ramipril Angioedema     Lips swell, hives     Social History     Tobacco Use    Smoking status: Never     Passive exposure: Past    Smokeless tobacco: Never   Substance Use Topics    Alcohol use: Yes     Alcohol/week: 1.0 standard drink of alcohol     Types: 1 Cans of beer per week     Comment: occasionally     Review of Systems   Cardiovascular:  Negative for chest pain, dyspnea on exertion, leg swelling, orthopnea, palpitations, paroxysmal nocturnal dyspnea and syncope.   Respiratory:  Negative for cough, shortness of breath and wheezing.    Hematologic/Lymphatic: Bruises/bleeds easily.   Gastrointestinal:  Negative for abdominal pain, hematemesis, hematochezia, melena, nausea and vomiting.   Neurological:  Negative for dizziness and light-headedness.       ECG 12 Lead    Date/Time: 4/2/2025 3:14 PM  Performed by: Devin Alexis MD    Authorized by: Devin Alexis MD  Comparison: compared with previous ECG from 1/26/2025  Similar to previous ECG  Rhythm: sinus bradycardia  Ectopy: atrial premature contractions  Rate: bradycardic  BPM: 53  Conduction: 1st degree AV block  QRS axis: normal  Other findings: non-specific ST-T wave changes and poor R wave progression    Clinical impression: abnormal EKG           Objective:     Vitals reviewed.   Constitutional:       General: Not in acute distress.      "Appearance: Well-developed and not in distress.   HENT:      Head: Normocephalic and atraumatic.   Neck:      Vascular: No carotid bruit or JVD.   Pulmonary:      Effort: Pulmonary effort is normal.      Breath sounds: Normal breath sounds. No wheezing. No rhonchi. No rales.   Cardiovascular:      Bradycardia present. Regular rhythm.      Murmurs: There is no murmur.      No gallop.    Pulses:     Intact distal pulses.   Edema:     Peripheral edema absent.   Abdominal:      General: Bowel sounds are normal. There is no distension.      Palpations: Abdomen is soft.      Tenderness: There is no abdominal tenderness.   Skin:     General: Skin is warm and dry. There is no cyanosis.      Findings: No erythema or rash.   Neurological:      Mental Status: Alert. Mental status is at baseline.       /64   Pulse 53   Ht 175.3 cm (69\")   Wt 96.6 kg (213 lb)   SpO2 99%   BMI 31.45 kg/m²     Data/Lab Review:     Lab Results   Component Value Date    CHLPL 134 (L) 04/18/2024    TRIG 59 04/18/2024    HDL 68 04/18/2024    LDL 54 04/18/2024     Results for orders placed during the hospital encounter of 08/12/24    Adult Transthoracic Echo Complete W/ Cont if Necessary Per Protocol    Interpretation Summary    Left ventricular systolic function is normal. Left ventricular ejection fraction appears to be 61 - 65%.    Left ventricular wall thickness is consistent with mild concentric hypertrophy.    Normal right ventricular cavity size and systolic function noted.    No significant (greater than mild) valvular abnormalities identified on this study.        Assessment:          Diagnosis Plan   1. Coronary artery disease involving coronary bypass graft of native heart without angina pectoris  ECG 12 Lead      2. PAF (paroxysmal atrial fibrillation)        3. Mixed hyperlipidemia             Plan:       1.  Coronary artery disease: Stable at this time.  The patient describes no anginal symptoms.  He remains on anticoagulant " therapy given history of paroxysmal atrial fibrillation, therefore is not on antiplatelet therapy.  We did discuss today that if anticoagulation needs to be held for any reason, he needs to be maintained on aspirin 81 mg daily while off of anticoagulation.  Factors appear to be well-controlled with good control blood pressure and cholesterol.  The patient continues beta-blocker therapy along with high intensity statin therapy.  No testing indicated at this time.  Continue current therapies.    2.  Paroxysmal atrial fibrillation: No obvious recurrences of atrial fibrillation.  The patient is in sinus rhythm today.  He remains anticoagulated for stroke risk reduction.  Continue Eliquis at this time.    [id number] SCORE   [id number] Score: 3 (4/2/2025  2:40 PM)    3.  Mixed hyperlipidemia: LDL cholesterol is at goal on high intensity statin therapy, which the patient tolerates well.    The patient follows up with Dr. Lepe regarding aortic root dilation.  He is stable at this time.    I will plan to see the patient again in 1 year unless otherwise needed sooner.

## 2025-06-12 ENCOUNTER — PATIENT MESSAGE (OUTPATIENT)
Dept: INTERNAL MEDICINE | Age: 70
End: 2025-06-12

## 2025-06-12 DIAGNOSIS — G47.33 OSA (OBSTRUCTIVE SLEEP APNEA): Primary | ICD-10-CM

## 2025-06-24 DIAGNOSIS — N40.1 BENIGN PROSTATIC HYPERPLASIA WITH URINARY OBSTRUCTION: ICD-10-CM

## 2025-06-24 DIAGNOSIS — N40.1 BENIGN PROSTATIC HYPERPLASIA WITH LOWER URINARY TRACT SYMPTOMS, SYMPTOM DETAILS UNSPECIFIED: ICD-10-CM

## 2025-06-24 DIAGNOSIS — N13.8 BENIGN PROSTATIC HYPERPLASIA WITH URINARY OBSTRUCTION: ICD-10-CM

## 2025-06-24 RX ORDER — TAMSULOSIN HYDROCHLORIDE 0.4 MG/1
1 CAPSULE ORAL NIGHTLY
Qty: 90 CAPSULE | Refills: 3 | Status: SHIPPED | OUTPATIENT
Start: 2025-06-24

## 2025-06-24 RX ORDER — FINASTERIDE 5 MG/1
5 TABLET, FILM COATED ORAL DAILY
Qty: 90 TABLET | Refills: 3 | Status: SHIPPED | OUTPATIENT
Start: 2025-06-24

## (undated) DEVICE — GW CHOICE PT XSUP .014 182CM

## (undated) DEVICE — PK CATH CARD 30 CA/4

## (undated) DEVICE — 6F .070 MPA 1 100CM: Brand: VISTA BRITE TIP

## (undated) DEVICE — GC 7F 078 MP A-1: Brand: VISTA BRITE TIP

## (undated) DEVICE — DEV TORQ GW HOT/PINK

## (undated) DEVICE — 6F .070 RCB 100CM: Brand: VISTA BRITE TIP

## (undated) DEVICE — BNDG ESMARK 4IN 9FT LF STRL BLU

## (undated) DEVICE — SOL IRR NACL 0.9PCT BT 1000ML

## (undated) DEVICE — GC 7F 078 AR 1: Brand: VISTA BRITE TIP

## (undated) DEVICE — PAD, DEFIB, ADULT, RADIOTRANS, PHYSIO: Brand: MEDLINE

## (undated) DEVICE — Device

## (undated) DEVICE — PERCLOSE™ PROSTYLE™ SUTURE-MEDIATED CLOSURE AND REPAIR SYSTEM: Brand: PERCLOSE™ PROSTYLE™

## (undated) DEVICE — GUIDE EXTENSION CATHETER: Brand: GUIDEZILLA™ II

## (undated) DEVICE — SOLIDIFIER LIQUI LOC PLUS 2000CC

## (undated) DEVICE — ADHS SKIN PREMIERPRO EXOFIN TOPICAL HI/VISC .5ML

## (undated) DEVICE — TOOL INSRT GW MTL OR PLSTC

## (undated) DEVICE — MASK,OXYGEN,MED CONC,ADLT,7' TUB, UC: Brand: PENDING

## (undated) DEVICE — MODEL AT P65, P/N 701554-001KIT CONTENTS: HAND CONTROLLER, 3-WAY HIGH-PRESSURE STOPCOCK WITH ROTATING END AND PREMIUM HIGH-PRESSURE TUBING: Brand: ANGIOTOUCH® KIT

## (undated) DEVICE — VLV HEMO GUARDIAN INSRT/TOOL W TORQ DEV

## (undated) DEVICE — BLD SCLPL BEAVR MINI STR 2BVL 180D LF

## (undated) DEVICE — PINNACLE INTRODUCER SHEATH: Brand: PINNACLE

## (undated) DEVICE — GW HORNET 14 STR TP 190CM

## (undated) DEVICE — CATH F5 INF JR 4 100CM: Brand: INFINITI

## (undated) DEVICE — Device: Brand: DEFENDO AIR/WATER/SUCTION AND BIOPSY VALVE

## (undated) DEVICE — CUFF,BP,DISP,1 TUBE,ADULT,HP: Brand: MEDLINE

## (undated) DEVICE — PTCA DILATATION CATHETER: Brand: EMERGE™

## (undated) DEVICE — UNDERCAST PADDING: Brand: DEROYAL

## (undated) DEVICE — BNDG ELAS ECON W/CLIP 4IN 5YD LF STRL

## (undated) DEVICE — CVR BRD ARM 13X30

## (undated) DEVICE — CONMED SCOPE SAVER BITE BLOCK, 20X27 MM: Brand: SCOPE SAVER

## (undated) DEVICE — PK EXTRM 30

## (undated) DEVICE — CATH DIAG IMPULSE RCB 5F 100CM

## (undated) DEVICE — GLV SURG DERMASSURE GRN LF PF 8.0

## (undated) DEVICE — GW GRAPHX .014 182CM

## (undated) DEVICE — GW STARTER FXD CORE J .035 3X150CM 3MM

## (undated) DEVICE — 6F .070 JR 4 100CM: Brand: CORDIS

## (undated) DEVICE — MODEL BT2000 P/N 700287-012KIT CONTENTS: MANIFOLD WITH SALINE AND CONTRAST PORTS, SALINE TUBING WITH SPIKE AND HAND SYRINGE, TRANSDUCER: Brand: BT2000 AUTOMATED MANIFOLD KIT

## (undated) DEVICE — TRAP FLD MINIVAC MEGADYNE 100ML

## (undated) DEVICE — DISPOSABLE TOURNIQUET CUFF SINGLE BLADDER, SINGLE PORT AND QUICK CONNECT CONNECTOR: Brand: COLOR CUFF

## (undated) DEVICE — SENSR O2 OXIMAX FNGR A/ 18IN NONSTR

## (undated) DEVICE — GW FIGHTER STR TP 190CM

## (undated) DEVICE — STPCK 3/WY HP M/RA W/OFF/HNDL 1050PSI STRL

## (undated) DEVICE — FRCP BIOP ENDO CAPTURAPRO SPK SERR 2.8MM 230CM

## (undated) DEVICE — CATH DIAG IMPULSE M/ PK 145 5FR

## (undated) DEVICE — YANKAUER,BULB TIP WITH VENT: Brand: ARGYLE

## (undated) DEVICE — DEV BALN EXCHG TRAPPER 2MM

## (undated) DEVICE — MINI TREK CORONARY DILATATION CATHETER 2.0 MM X 12 MM / RAPID-EXCHANGE: Brand: MINI TREK

## (undated) DEVICE — Device: Brand: EXTENSION

## (undated) DEVICE — SPNG GZ WOVN 4X4IN 12PLY 10/BX STRL

## (undated) DEVICE — HI-TORQUE PILOT 200 GUIDE WIRE .014 STRAIGHT TIP 3.0 CM X 190 CM: Brand: HI-TORQUE PILOT

## (undated) DEVICE — BAPTIST TURNOVER KIT: Brand: MEDLINE INDUSTRIES, INC.

## (undated) DEVICE — CANN NASL ETCO2 LO/FLO A/

## (undated) DEVICE — TREK CORONARY DILATATION CATHETER 3.0 MM X 12 MM / RAPID-EXCHANGE: Brand: TREK

## (undated) DEVICE — GC 7F 078 JR 4: Brand: VISTA BRITE TIP

## (undated) DEVICE — THE CHANNEL CLEANING BRUSH IS A NYLON FLEXI BRUSH ATTACHED TO A FLEXIBLE PLASTIC SHEATH DESIGNED TO SAFELY REMOVE DEBRIS FROM FLEXIBLE ENDOSCOPES.

## (undated) DEVICE — INFLATION DEVICE: Brand: ENCORE™ 26

## (undated) DEVICE — SUT VIC 4/0 RB1 27IN VCP214H

## (undated) DEVICE — MICROCATHETER: Brand: MAMBA™ FLEX

## (undated) DEVICE — 4-PORT MANIFOLD: Brand: NEPTUNE 2

## (undated) DEVICE — GLV SURG SENSICARE PI ORTHO SZ8 LF STRL

## (undated) DEVICE — XIENCE SKYPOINT™ EVEROLIMUS ELUTING CORONARY STENT SYSTEM 2.75 MM X 15 MM / RAPID-EXCHANGE
Type: IMPLANTABLE DEVICE | Site: CORONARY | Status: NON-FUNCTIONAL
Brand: XIENCE SKYPOINT™